# Patient Record
Sex: FEMALE | Race: WHITE | NOT HISPANIC OR LATINO | Employment: PART TIME | URBAN - METROPOLITAN AREA
[De-identification: names, ages, dates, MRNs, and addresses within clinical notes are randomized per-mention and may not be internally consistent; named-entity substitution may affect disease eponyms.]

---

## 2017-01-19 ENCOUNTER — OFFICE VISIT (OUTPATIENT)
Dept: MEDICAL GROUP | Facility: MEDICAL CENTER | Age: 47
End: 2017-01-19
Attending: FAMILY MEDICINE
Payer: MEDICAID

## 2017-01-19 VITALS
TEMPERATURE: 97.4 F | HEART RATE: 78 BPM | DIASTOLIC BLOOD PRESSURE: 80 MMHG | SYSTOLIC BLOOD PRESSURE: 120 MMHG | HEIGHT: 62 IN | BODY MASS INDEX: 34.96 KG/M2 | WEIGHT: 190 LBS | OXYGEN SATURATION: 96 %

## 2017-01-19 DIAGNOSIS — K58.2 IRRITABLE BOWEL SYNDROME WITH BOTH CONSTIPATION AND DIARRHEA: ICD-10-CM

## 2017-01-19 DIAGNOSIS — R30.0 DYSURIA: ICD-10-CM

## 2017-01-19 LAB
APPEARANCE UR: CLEAR
BILIRUB UR STRIP-MCNC: NEGATIVE MG/DL
COLOR UR AUTO: NORMAL
GLUCOSE UR STRIP.AUTO-MCNC: NEGATIVE MG/DL
KETONES UR STRIP.AUTO-MCNC: NEGATIVE MG/DL
LEUKOCYTE ESTERASE UR QL STRIP.AUTO: NEGATIVE
NITRITE UR QL STRIP.AUTO: NEGATIVE
PH UR STRIP.AUTO: 5 [PH] (ref 5–8)
PROT UR QL STRIP: NEGATIVE MG/DL
RBC UR QL AUTO: NEGATIVE
SP GR UR STRIP.AUTO: 1.01
UROBILINOGEN UR STRIP-MCNC: NEGATIVE MG/DL

## 2017-01-19 PROCEDURE — 99213 OFFICE O/P EST LOW 20 MIN: CPT | Performed by: FAMILY MEDICINE

## 2017-01-19 PROCEDURE — 99214 OFFICE O/P EST MOD 30 MIN: CPT | Performed by: FAMILY MEDICINE

## 2017-01-19 RX ORDER — CIPROFLOXACIN 500 MG/1
500 TABLET, FILM COATED ORAL 2 TIMES DAILY
Qty: 10 TAB | Refills: 1 | Status: SHIPPED | OUTPATIENT
Start: 2017-01-19 | End: 2017-03-21

## 2017-01-19 ASSESSMENT — ENCOUNTER SYMPTOMS
FLANK PAIN: 0
TINGLING: 1
CONSTIPATION: 1
SHORTNESS OF BREATH: 0
NECK PAIN: 1
SWEATS: 0
BACK PAIN: 1
COUGH: 0
VOMITING: 0
DIARRHEA: 1
PALPITATIONS: 0
ABDOMINAL PAIN: 0
HEADACHES: 0
NAUSEA: 0
FEVER: 0
CHILLS: 0

## 2017-01-19 NOTE — PROGRESS NOTES
Subjective:      Ambreen Higuera is a 46 y.o. female who presents with Dysuria and Constipation            HPI Comments: Patient here for a recent onset of worsened dysuria, recurrent constipation and diarrhea symptoms. She recently noticed that she has been having more urgency, frequency and discomfort with urination, but no fever or flank pain. She does have a history of interstitial cystitis but states it feels a little different from her normal symptoms. Will send her urine off for culture as well as Avodart and antibiotic if her symptoms should suddenly worsen. We'll continue to follow.    She also has a history of irritable bowel syndrome, with alternating bouts of constipation and diarrhea. She has tried fiber supplements exercise as well as changes in her diet but are still having worsened symptoms. Will refer to GI for a possible colonoscopy as well as a further evaluation and possible management of her symptoms. We'll continue to follow.     Current medications, allergies, and problem list reviewed with patient and updated in EPIC.      Dysuria   This is a recurrent problem. The current episode started more than 1 month ago. The problem occurs intermittently. The problem has been unchanged. The pain is at a severity of 5/10. There has been no fever. There is no history of pyelonephritis. Associated symptoms include frequency, hesitancy and urgency. Pertinent negatives include no chills, discharge, flank pain, hematuria, nausea, possible pregnancy, sweats or vomiting. She has tried nothing (will order a UCx and an antibiotic if UCx +) for the symptoms.   Constipation  Associated symptoms include back pain and diarrhea. Pertinent negatives include no abdominal pain, fever, nausea or vomiting.       Review of Systems   Constitutional: Negative for fever and chills.   HENT: Negative for hearing loss.    Respiratory: Negative for cough and shortness of breath.    Cardiovascular: Negative for chest pain and  "palpitations.   Gastrointestinal: Positive for diarrhea and constipation. Negative for nausea, vomiting and abdominal pain.   Genitourinary: Positive for dysuria, hesitancy, urgency and frequency. Negative for hematuria and flank pain.   Musculoskeletal: Positive for back pain and neck pain.   Neurological: Positive for tingling. Negative for headaches.          Objective:     /80 mmHg  Pulse 78  Temp(Src) 36.3 °C (97.4 °F)  Ht 1.575 m (5' 2.01\")  Wt 86.183 kg (190 lb)  BMI 34.74 kg/m2  SpO2 96%  LMP 03/08/2001     Physical Exam   HENT:   Right Ear: External ear normal.   Left Ear: External ear normal.   Nose: Nose normal.   Mouth/Throat: Oropharynx is clear and moist.   Eyes: EOM are normal. Pupils are equal, round, and reactive to light.   Neck: Normal range of motion.   Cardiovascular: Normal rate, regular rhythm and normal heart sounds.  Exam reveals no friction rub.    No murmur heard.  Pulmonary/Chest: Breath sounds normal. No respiratory distress. She has no wheezes. She has no rales.   Abdominal: Soft. Bowel sounds are normal. She exhibits no distension. There is tenderness. There is no rebound and no guarding.   Neurological: She is alert.   Skin: Skin is warm and dry.   Psychiatric: Her behavior is normal.               Assessment/Plan:     1. Dysuria  Will have her get a UCx, if her symptoms worsen we'll have her start Cipro twice daily as directed. We'll also have her increase her fluid intake. If her symptoms continue to worsen we'll have her follow-up with her urologist for a possible cystoscope. We'll continue to follow.  - POCT Urinalysis  - URINE CULTURE(NEW); Future    2. Irritable bowel syndrome with both constipation and diarrhea  She has tried all the conservative measures were recommended but still is having worsening symptoms. So a referral to GI has been made for additional evaluation as well as assistance in management. We'll continue to follow.  - REFERRAL TO " GASTROENTEROLOGY

## 2017-01-19 NOTE — MR AVS SNAPSHOT
"Ambreen Higuera   2017 9:10 AM   Office Visit   MRN: [id number]    [facility]   Dept Phone:  [phone]    Description:  Female : 1970   Provider:  Daniel Brand M.D.           Reason for Visit     Dysuria     Constipation           Allergies as of 2017     Allergen Noted Reactions    Codeine 2009   Vomiting    Doxycycline 10/03/2014       Monodox 2009   Vomiting    Vicodin [Hydrocodone-Acetaminophen] 2009   Vomiting      You were diagnosed with     Dysuria   [788.1.ICD-9-CM]       Irritable bowel syndrome with both constipation and diarrhea   [2678563]         Vital Signs     Blood Pressure Pulse Temperature Height Weight Body Mass Index    120/80 mmHg 78 36.3 °C (97.4 °F) 1.575 m (5' 2.01\") 86.183 kg (190 lb) 34.74 kg/m2    Oxygen Saturation Last Menstrual Period Smoking Status             96% 2001 Current Every Day Smoker         Basic Information     Date Of Birth Sex Race Ethnicity Preferred Language    1970 Female White Non- English      Your appointments     2017 11:00 AM   Follow Up Med Management with Darius Sanots M.D.   [facility] [address] [place] [address])    [address] [place] [id number] [phone]              Problem List              ICD-10-CM Priority Class Noted - Resolved    Insomnia G47.00   2009 - Present    Mild intermittent asthma without complication J45.20   2009 - Present    Hyperlipidemia E78.5   2009 - Present    Depression with anxiety F41.8   2/10/2010 - Present    PTSD (post-traumatic stress disorder) F43.10   2/10/2010 - Present    Cervicalgia M54.2   10/22/2014 - Present    Obesity (BMI 30-39.9) E66.9   12/15/2016 - Present      Health Maintenance        Date Due Completion Dates    IMM DTaP/Tdap/Td Vaccine (1 - Tdap) 1988 ---    IMM PNEUMOCOCCAL 19-64 (ADULT) MEDIUM RISK SERIES (1 of 1 - PPSV23) 1988 ---    PAP SMEAR 1990 ---    IMM " INFLUENZA (1) 9/1/2016 ---    MAMMOGRAM 3/22/2017 3/22/2016, 3/15/2016, 3/31/2010, 3/31/2010, 1/30/2009, 1/30/2009            Results     POCT Urinalysis      Component Value Standard Range & Units    POC Color Light Yellow Negative    POC Appearance clear Negative    POC Leukocyte Esterase negative Negative    POC Nitrites negative Negative    POC Urobiligen negative Negative (0.2) mg/dL    POC Protein negative Negative mg/dL    POC Urine PH 5.0 5.0 - 8.0    POC Blood negative Negative    POC Specific Gravity 1.015 <1.005 - >1.030    POC Ketones negative Negative mg/dL    POC Biliruben negative Negative mg/dL    POC Glucose negative Negative mg/dL                        Current Immunizations     No immunizations on file.      Below and/or attached are the medications your provider expects you to take. Review all of your home medications and newly ordered medications with your provider and/or pharmacist. Follow medication instructions as directed by your provider and/or pharmacist. Please keep your medication list with you and share with your provider. Update the information when medications are discontinued, doses are changed, or new medications (including over-the-counter products) are added; and carry medication information at all times in the event of emergency situations     Allergies:  CODEINE - Vomiting     DOXYCYCLINE - (reactions not documented)     MONODOX - Vomiting     VICODIN - Vomiting               Medications  Valid as of: January 19, 2017 -  9:47 AM    Generic Name Brand Name Tablet Size Instructions for use    Albuterol Sulfate (Aero Soln) albuterol 108 (90 BASE) MCG/ACT Inhale 2 Puffs by mouth as needed.        Albuterol Sulfate (Aero Soln) albuterol 108 (90 BASE) MCG/ACT Inhale 2 Puffs by mouth every four hours as needed.        Albuterol Sulfate (Aero Soln) albuterol 108 (90 BASE) MCG/ACT Inhale 2 Puffs by mouth every 6 hours as needed for Shortness of Breath.        Amitriptyline HCl (Tab)  ELAVIL 10 MG Take 100 mg by mouth every evening.        Amitriptyline HCl (Tab) ELAVIL 100 MG Take 1 Tab by mouth every bedtime.        Benzonatate (Cap) TESSALON 200 MG Take 1 Cap by mouth 3 times a day as needed for Cough.        Carbamide Peroxide (Solution) DEBROX 6.5 % Place 6 Drops in ear 2 times a day. Administer drops in both ears.        Ciprofloxacin HCl (Tab) CIPRO 500 MG Take 1 Tab by mouth 2 times a day.        ClonazePAM   Take 1 mg by mouth as needed.        ClonazePAM (Tab) KLONOPIN 1 MG Take 1 Tab by mouth 1 time daily as needed.        Fluconazole (Tab) DIFLUCAN 150 MG Take 1 Tab by mouth every day.        Gabapentin (Cap) NEURONTIN 300 MG Take 300 mg by mouth 3 times a day.        Gemfibrozil (Tab) LOPID 600 MG TAKE ONE TABLET BY MOUTH TWICE DAILY        HydroCHLOROthiazide (Tab) HYDRODIURIL 12.5 MG Take 1 Tab by mouth every day.        LamoTRIgine (Tab) LAMICTAL 200 MG Take 1 Tab by mouth every day.        Metoprolol Tartrate (Tab) LOPRESSOR 25 MG Take 0.5 Tabs by mouth 2 times a day.        Mometasone Furoate (Suspension) NASONEX 50 MCG/ACT Spray 2 Sprays in nose every day.        Ondansetron HCl (Tab) ZOFRAN 4 MG Take 1 Tab by mouth every 8 hours as needed for Nausea/Vomiting.        Oxybutynin Chloride (Tab) DITROPAN 5 MG Take 5 mg by mouth every day.        PredniSONE (Tab) DELTASONE 20 MG Take 2 tabs x 3 days, take 1 tabs x 2 days.        .                 Medicines prescribed today were sent to:     [facility] [address] [place] [address] - [facility] [address] [facility] [address]    Phone: [phone] Fax: [phone]    Open 24 Hours?: No      Medication refill instructions:       If your prescription bottle indicates you have medication refills left, it is not necessary to call your provider’s office. Please contact your pharmacy and they will refill your medication.    If your prescription bottle indicates you do not have any refills left, you may request  refills at any time through one of the following ways: The online [employer] system (except Urgent Care), by calling your provider’s office, or by asking your pharmacy to contact your provider’s office with a refill request. Medication refills are processed only during regular business hours and may not be available until the next business day. Your provider may request additional information or to have a follow-up visit with you prior to refilling your medication.   *Please Note: Medication refills are assigned a new Rx number when refilled electronically. Your pharmacy may indicate that no refills were authorized even though a new prescription for the same medication is available at the pharmacy. Please request the medicine by name with the pharmacy before contacting your provider for a refill.        Your To Do List     Future Labs/Procedures Complete By Expires    URINE CULTURE(NEW)  As directed 1/19/2018      Referral     A referral request has been sent to our patient care coordination department. Please allow 3-5 business days for us to process this request and contact you either by phone or mail. If you do not hear from us by the 5th business day, please call us at [phone].           [employer] Access Code: [email or web address]  Expires: 2/18/2017  9:47 AM    [employer]  A secure, online tool to manage your health information     [employer]’s [employer]® is a secure, online tool that connects you to your personalized health information from the privacy of your home -- day or night - making it very easy for you to manage your healthcare. Once the activation process is completed, you can even access your medical information using the [employer] andrea, which is available for free in the Apple Andrea store or Google Play store.     [employer] provides the following levels of access (as shown below):   My Chart Features   [facility] Doctor [facility]  Specialists [facility]  Urgent  Care Non-[facility]  Primary Care  Doctor   Email  your healthcare team securely and privately 24/7 X X X    Manage appointments: schedule your next appointment; view details of past/upcoming appointments X      Request prescription refills. X      View recent personal medical records, including lab and immunizations X X X X   View health record, including health history, allergies, medications X X X X   Read reports about your outpatient visits, procedures, consult and ER notes X X X X   See your discharge summary, which is a recap of your hospital and/or ER visit that includes your diagnosis, lab results, and care plan. X X       How to register for [employer]:  1. Go to  [email or web address].  2. Click on the Sign Up Now box, which takes you to the New Member Sign Up page. You will need to provide the following information:  a. Enter your Banno Access Code exactly as it appears at the top of this page. (You will not need to use this code after you’ve completed the sign-up process. If you do not sign up before the expiration date, you must request a new code.)   b. Enter your date of birth.   c. Enter your home email address.   d. Click Submit, and follow the next screen’s instructions.  3. Create a Banno ID. This will be your Banno login ID and cannot be changed, so think of one that is secure and easy to remember.  4. Create a Banno password. You can change your password at any time.  5. Enter your Password Reset Question and Answer. This can be used at a later time if you forget your password.   6. Enter your e-mail address. This allows you to receive e-mail notifications when new information is available in Banno.  7. Click Sign Up. You can now view your health information.    For assistance activating your Banno account, call [phone]

## 2017-01-24 ENCOUNTER — HOSPITAL ENCOUNTER (OUTPATIENT)
Facility: MEDICAL CENTER | Age: 47
End: 2017-01-24
Attending: FAMILY MEDICINE
Payer: MEDICAID

## 2017-01-24 DIAGNOSIS — R30.0 DYSURIA: ICD-10-CM

## 2017-01-24 PROCEDURE — 87086 URINE CULTURE/COLONY COUNT: CPT

## 2017-01-25 ENCOUNTER — OFFICE VISIT (OUTPATIENT)
Dept: MEDICAL GROUP | Facility: MEDICAL CENTER | Age: 47
End: 2017-01-25
Attending: NURSE PRACTITIONER
Payer: MEDICAID

## 2017-01-25 VITALS
RESPIRATION RATE: 18 BRPM | DIASTOLIC BLOOD PRESSURE: 80 MMHG | TEMPERATURE: 97.3 F | SYSTOLIC BLOOD PRESSURE: 130 MMHG | HEIGHT: 62 IN | BODY MASS INDEX: 35.15 KG/M2 | WEIGHT: 191 LBS | HEART RATE: 92 BPM

## 2017-01-25 DIAGNOSIS — J40 BRONCHITIS: ICD-10-CM

## 2017-01-25 DIAGNOSIS — R05.9 COUGH: ICD-10-CM

## 2017-01-25 DIAGNOSIS — L98.9 SKIN LESION: ICD-10-CM

## 2017-01-25 DIAGNOSIS — B02.9 HERPES ZOSTER WITHOUT COMPLICATION: ICD-10-CM

## 2017-01-25 PROCEDURE — 99214 OFFICE O/P EST MOD 30 MIN: CPT | Performed by: NURSE PRACTITIONER

## 2017-01-25 PROCEDURE — 99212 OFFICE O/P EST SF 10 MIN: CPT | Performed by: NURSE PRACTITIONER

## 2017-01-25 RX ORDER — AZITHROMYCIN 250 MG/1
TABLET, FILM COATED ORAL
Qty: 6 TAB | Refills: 0 | Status: SHIPPED | OUTPATIENT
Start: 2017-01-25 | End: 2017-03-21

## 2017-01-25 RX ORDER — METHYLPREDNISOLONE 4 MG/1
4 TABLET ORAL DAILY
Qty: 1 KIT | Refills: 0 | Status: SHIPPED | OUTPATIENT
Start: 2017-01-25 | End: 2017-03-21

## 2017-01-25 RX ORDER — ACYCLOVIR 200 MG/1
200 CAPSULE ORAL
Qty: 35 CAP | Refills: 0 | Status: SHIPPED | OUTPATIENT
Start: 2017-01-25 | End: 2017-03-21

## 2017-01-25 NOTE — ASSESSMENT & PLAN NOTE
"Skin lesion to left buttocks.  Pt worried it may be shingles.  States had shingles 2 months ago and had right buttocks lesion.   States had vesicle type rash to left buttocks and now it is \"itchy\" and slightly irritating. Reports feels similar to recent Shingles.  No change in skin lesion with Bactroban (left over) or with   Anti-fungal cream pt has.  Denies other areas of skin disruption.   "

## 2017-01-25 NOTE — PROGRESS NOTES
"    Chief Complaint: left buttocks skin lesion, itchy, cough/congestion    HPI:  Ambreen presents to the clinic for concern about rash on buttocks.  Her PCP, Dr Brand is not available today to see her.    Her PMH includes:    Anxiety, Depression, PTSD, Insomnia  Interstitial Cystitis  Constipation/diarrhea  Asthma, Mild  Tobacco Use-Smoker  Obesity  Vaginal Candida    Review of Records show  1/19/17 Clinic Visit w Dr Brand for concerns r/t constipation,diarrhea, dysuria  UA was negative. Pt was placed on Cipro and referred to GI MD. Egan  Report shows  12/31/16 Clonazepam 1 mg # 30 by Darius Santos  11/15/16 by Clonazepam 1 mg by LUZMARIA Santos  More similar entries for Ambien and Clonazepam by DR Santos in report  --------------------------------------------------------------------------------------------------      Skin lesion  Skin lesion to left buttocks.  Pt worried it may be shingles.  States had shingles 2 months ago and had right buttocks lesion.   States had vesicle type rash to left buttocks and now it is \"itchy\" and slightly irritating. Reports feels similar to recent Shingles.  No change in skin lesion with Bactroban (left over) or with   Anti-fungal cream pt has.  Denies other areas of skin disruption.     Cough  Pt reports cough and some lung congestion x 4-5 days.  Mucus in yellow. Phlegm is yellow. Believes she has \"bad cold\".   Felt fever last night. Today in Clinic non febrile.  Denies chills. Denies sore throat except when coughs.      Patient Active Problem List    Diagnosis Date Noted   • Skin lesion 01/25/2017   • Cough 01/25/2017   • Obesity (BMI 30-39.9) 12/15/2016   • Cervicalgia 10/22/2014   • Depression with anxiety 02/10/2010   • PTSD (post-traumatic stress disorder) 02/10/2010   • Insomnia 08/28/2009   • Mild intermittent asthma without complication 08/28/2009   • Hyperlipidemia 08/28/2009       Allergies:Codeine; Doxycycline; Monodox; and Vicodin    Current Outpatient " Prescriptions   Medication Sig Dispense Refill   • acyclovir (ZOVIRAX) 200 MG Cap Take 1 Cap by mouth 5 Times a Day. 35 Cap 0   • MethylPREDNISolone (MEDROL DOSEPAK) 4 MG Tablet Therapy Pack Take 1 Tab by mouth every day. 1 Kit 0   • azithromycin (ZITHROMAX) 250 MG Tab Z pack-Take 2 tabs day one and then 1 tab daily for a total of 5 days 6 Tab 0   • ciprofloxacin (CIPRO) 500 MG Tab Take 1 Tab by mouth 2 times a day. 10 Tab 1   • lamotrigine (LAMICTAL) 200 MG tablet Take 1 Tab by mouth every day. 30 Tab 5   • amitriptyline (ELAVIL) 100 MG Tab Take 1 Tab by mouth every bedtime. 30 Tab 5   • clonazepam (KLONOPIN) 1 MG Tab Take 1 Tab by mouth 1 time daily as needed. 30 Tab 2   • gemfibrozil (LOPID) 600 MG Tab TAKE ONE TABLET BY MOUTH TWICE DAILY 60 Tab 3   • mometasone (NASONEX) 50 MCG/ACT nasal spray Spray 2 Sprays in nose every day. 1 Inhaler 11   • fluconazole (DIFLUCAN) 150 MG tablet Take 1 Tab by mouth every day. 1 Tab 1   • albuterol 108 (90 BASE) MCG/ACT Aero Soln inhalation aerosol Inhale 2 Puffs by mouth every 6 hours as needed for Shortness of Breath. 8.5 g 3   • metoprolol (LOPRESSOR) 25 MG Tab Take 0.5 Tabs by mouth 2 times a day. 30 Tab 11   • carbamide peroxide (CARBAMOXIDE EAR DROPS) 6.5 % Solution Place 6 Drops in ear 2 times a day. Administer drops in both ears. 1 Bottle 0   • predniSONE (DELTASONE) 20 MG Tab Take 2 tabs x 3 days, take 1 tabs x 2 days. 8 Tab 0   • gabapentin (NEURONTIN) 300 MG Cap Take 300 mg by mouth 3 times a day.     • hydrochlorothiazide (HYDRODIURIL) 12.5 MG tablet Take 1 Tab by mouth every day. 30 Tab 6   • amitriptyline (ELAVIL) 10 MG Tab Take 100 mg by mouth every evening.     • albuterol (VENTOLIN OR PROVENTIL) 108 (90 BASE) MCG/ACT Aero Soln inhalation aerosol Inhale 2 Puffs by mouth every four hours as needed. 1 Inhaler 0   • benzonatate (TESSALON) 200 MG capsule Take 1 Cap by mouth 3 times a day as needed for Cough. 60 Cap 0   • ondansetron (ZOFRAN) 4 MG TABS Take 1 Tab by  "mouth every 8 hours as needed for Nausea/Vomiting. 5 Tab 0   • albuterol (VENTOLIN OR PROVENTIL) 108 (90 BASE) MCG/ACT AERS inhalation aerosol Inhale 2 Puffs by mouth as needed.     • CLONAZEPAM PO Take 1 mg by mouth as needed.     • oxybutynin (DITROPAN) 5 MG TABS Take 5 mg by mouth every day.       No current facility-administered medications for this visit.       Social History   Substance Use Topics   • Smoking status: Current Every Day Smoker     Types: Cigarettes   • Smokeless tobacco: Never Used   • Alcohol Use: No       Family History   Problem Relation Age of Onset   • Cancer Father    • Cancer Maternal Aunt        ROS:  Review of Systems   See HPI Above        Exam:  Blood pressure 130/80, pulse 92, temperature 36.3 °C (97.3 °F), resp. rate 18, height 1.575 m (5' 2.01\"), weight 86.637 kg (191 lb), last menstrual period 03/08/2001.  General:  Well nourished, over-weight, well developed female in mild distress.  HENT:Head is grossly normal. PERRL.  Neck: Supple. Trachea is midline.  Pulmonary: Slight Scattered Rhonchi to upper lung fields, Not wheezing or rales. Normal effort.   Cardiovascular: Regular rate and rhythm.  Abdomen-Abdomen is soft, No tenderness. Small flat vesicular linear skin lesion to left mid buttocks. No drainage currently. Non red.   Slight tenderness to touch.   Upper extremities- Strong = . Good ROM  Lower extremities- neg for edema, redness, tenderness.  Neuro- A & O x 4. Speech clear and appropriate.     Current medications, allergies, and problem list reviewed with patient and updated in  [facility] today.    Assessment/Plan:  1. Skin lesion  Keep dry and clean and keep clothes from rubbing on it by light breathable dressing.   2. Herpes zoster without complication  acyclovir (ZOVIRAX) 200 MG Cap    MethylPREDNISolone (MEDROL DOSEPAK) 4 MG Tablet Therapy Pack   3. Bronchitis  azithromycin (ZITHROMAX) 250 MG Tab  Pt counseled to consider smoking cessation, but not ready to quit.   4. " Cough  otc Robitussin. Stay well hydrated   Follow up as needed. Call or return if questions, concerns, or worsening condition.

## 2017-01-25 NOTE — ASSESSMENT & PLAN NOTE
"Pt reports cough and some lung congestion x 4-5 days.  Mucus in yellow. Phlegm is yellow. Believes she has \"bad cold\".   Felt fever last night. Today in Clinic non febrile.  Denies chills. Denies sore throat except when coughs.  "

## 2017-01-25 NOTE — MR AVS SNAPSHOT
"        Ambreen Higuera   2017 9:50 AM   Office Visit   MRN: [id number]    [facility]   Dept Phone:  [phone]    Description:  Female : 1970   Provider:  TERRI Moya           Reason for Visit     Rash           Allergies as of 2017     Allergen Noted Reactions    Codeine 2009   Vomiting    Doxycycline 10/03/2014       Monodox 2009   Vomiting    Vicodin [Hydrocodone-Acetaminophen] 2009   Vomiting      You were diagnosed with     Skin lesion   [085757]       Herpes zoster without complication   [723202]       Bronchitis   [916957]         Vital Signs     Blood Pressure Pulse Temperature Respirations Height Weight    130/80 mmHg 92 36.3 °C (97.3 °F) 18 1.575 m (5' 2.01\") 86.637 kg (191 lb)    Body Mass Index Last Menstrual Period Smoking Status             34.93 kg/m2 2001 Current Every Day Smoker         Basic Information     Date Of Birth Sex Race Ethnicity Preferred Language    1970 Female White Non- English      Your appointments     2017 11:00 AM   Follow Up Med Management with Darius Santos M.D.   [facility] [address] [place] [address])    [address] [place] [address]  [address] [place] [id number] [phone]              Problem List              ICD-10-CM Priority Class Noted - Resolved    Insomnia G47.00   2009 - Present    Mild intermittent asthma without complication J45.20   2009 - Present    Hyperlipidemia E78.5   2009 - Present    Depression with anxiety F41.8   2/10/2010 - Present    PTSD (post-traumatic stress disorder) F43.10   2/10/2010 - Present    Cervicalgia M54.2   10/22/2014 - Present    Obesity (BMI 30-39.9) E66.9   12/15/2016 - Present    Skin lesion L98.9   2017 - Present      Health Maintenance        Date Due Completion Dates    IMM DTaP/Tdap/Td Vaccine (1 - Tdap) 1988 ---    IMM PNEUMOCOCCAL 19-64 (ADULT) MEDIUM RISK SERIES (1 of 1 - PPSV23) 1988 ---    PAP SMEAR " 4/27/1990 ---    IMM INFLUENZA (1) 9/1/2016 ---    MAMMOGRAM 3/22/2017 3/22/2016, 3/15/2016, 3/31/2010, 3/31/2010, 1/30/2009, 1/30/2009            Current Immunizations     No immunizations on file.      Below and/or attached are the medications your provider expects you to take. Review all of your home medications and newly ordered medications with your provider and/or pharmacist. Follow medication instructions as directed by your provider and/or pharmacist. Please keep your medication list with you and share with your provider. Update the information when medications are discontinued, doses are changed, or new medications (including over-the-counter products) are added; and carry medication information at all times in the event of emergency situations     Allergies:  CODEINE - Vomiting     DOXYCYCLINE - (reactions not documented)     MONODOX - Vomiting     VICODIN - Vomiting               Medications  Valid as of: January 25, 2017 -  9:55 AM    Generic Name Brand Name Tablet Size Instructions for use    Acyclovir (Cap) ZOVIRAX 200 MG Take 1 Cap by mouth 5 Times a Day.        Albuterol Sulfate (Aero Soln) albuterol 108 (90 BASE) MCG/ACT Inhale 2 Puffs by mouth as needed.        Albuterol Sulfate (Aero Soln) albuterol 108 (90 BASE) MCG/ACT Inhale 2 Puffs by mouth every four hours as needed.        Albuterol Sulfate (Aero Soln) albuterol 108 (90 BASE) MCG/ACT Inhale 2 Puffs by mouth every 6 hours as needed for Shortness of Breath.        Amitriptyline HCl (Tab) ELAVIL 10 MG Take 100 mg by mouth every evening.        Amitriptyline HCl (Tab) ELAVIL 100 MG Take 1 Tab by mouth every bedtime.        Azithromycin (Tab) ZITHROMAX 250 MG Z pack-Take 2 tabs day one and then 1 tab daily for a total of 5 days        Benzonatate (Cap) TESSALON 200 MG Take 1 Cap by mouth 3 times a day as needed for Cough.        Carbamide Peroxide (Solution) DEBROX 6.5 % Place 6 Drops in ear 2 times a day. Administer drops in both ears.         Ciprofloxacin HCl (Tab) CIPRO 500 MG Take 1 Tab by mouth 2 times a day.        ClonazePAM   Take 1 mg by mouth as needed.        ClonazePAM (Tab) KLONOPIN 1 MG Take 1 Tab by mouth 1 time daily as needed.        Fluconazole (Tab) DIFLUCAN 150 MG Take 1 Tab by mouth every day.        Gabapentin (Cap) NEURONTIN 300 MG Take 300 mg by mouth 3 times a day.        Gemfibrozil (Tab) LOPID 600 MG TAKE ONE TABLET BY MOUTH TWICE DAILY        HydroCHLOROthiazide (Tab) HYDRODIURIL 12.5 MG Take 1 Tab by mouth every day.        LamoTRIgine (Tab) LAMICTAL 200 MG Take 1 Tab by mouth every day.        MethylPREDNISolone (Tablet Therapy Pack) MEDROL DOSEPAK 4 MG Take 1 Tab by mouth every day.        Metoprolol Tartrate (Tab) LOPRESSOR 25 MG Take 0.5 Tabs by mouth 2 times a day.        Mometasone Furoate (Suspension) NASONEX 50 MCG/ACT Spray 2 Sprays in nose every day.        Ondansetron HCl (Tab) ZOFRAN 4 MG Take 1 Tab by mouth every 8 hours as needed for Nausea/Vomiting.        Oxybutynin Chloride (Tab) DITROPAN 5 MG Take 5 mg by mouth every day.        PredniSONE (Tab) DELTASONE 20 MG Take 2 tabs x 3 days, take 1 tabs x 2 days.        .                 Medicines prescribed today were sent to:     [facility] [address] [place] [address]    Phone: [phone] Fax: [phone]    Open 24 Hours?: No      Medication refill instructions:       If your prescription bottle indicates you have medication refills left, it is not necessary to call your provider’s office. Please contact your pharmacy and they will refill your medication.    If your prescription bottle indicates you do not have any refills left, you may request refills at any time through one of the following ways: The online Machine Safety Manangement system (except Urgent Care), by calling your provider’s office, or by asking your pharmacy to contact your provider’s office with a refill request. Medication refills are  processed only during regular business hours and may not be available until the next business day. Your provider may request additional information or to have a follow-up visit with you prior to refilling your medication.   *Please Note: Medication refills are assigned a new Rx number when refilled electronically. Your pharmacy may indicate that no refills were authorized even though a new prescription for the same medication is available at the pharmacy. Please request the medicine by name with the pharmacy before contacting your provider for a refill.           PricePanda Access Code: [email or web address]  Expires: 2/18/2017  9:47 AM    PricePanda  A secure, online tool to manage your health information     [employer]’s PricePanda® is a secure, online tool that connects you to your personalized health information from the privacy of your home -- day or night - making it very easy for you to manage your healthcare. Once the activation process is completed, you can even access your medical information using the PricePanda andrea, which is available for free in the Apple Andrea store or Google Play store.     PricePanda provides the following levels of access (as shown below):   My Chart Features   [facility] Doctor [facility]  Specialists [facility]  Urgent  Care Non-[facility]  Primary Care  Doctor   Email your healthcare team securely and privately 24/7 X X X    Manage appointments: schedule your next appointment; view details of past/upcoming appointments X      Request prescription refills. X      View recent personal medical records, including lab and immunizations X X X X   View health record, including health history, allergies, medications X X X X   Read reports about your outpatient visits, procedures, consult and ER notes X X X X   See your discharge summary, which is a recap of your hospital and/or ER visit that includes your diagnosis, lab results, and care plan. X X       How to register for PricePanda:  1. Go to   [email or web address].  2. Click on the Sign Up Now box, which takes you to the New Member Sign Up page. You will need to provide the following information:  a. Enter your Suvaco Access Code exactly as it appears at the top of this page. (You will not need to use this code after you’ve completed the sign-up process. If you do not sign up before the expiration date, you must request a new code.)   b. Enter your date of birth.   c. Enter your home email address.   d. Click Submit, and follow the next screen’s instructions.  3. Create a Suvaco ID. This will be your Suvaco login ID and cannot be changed, so think of one that is secure and easy to remember.  4. Create a Innovational Fundingt password. You can change your password at any time.  5. Enter your Password Reset Question and Answer. This can be used at a later time if you forget your password.   6. Enter your e-mail address. This allows you to receive e-mail notifications when new information is available in Suvaco.  7. Click Sign Up. You can now view your health information.    For assistance activating your Suvaco account, call [phone]

## 2017-01-26 LAB
BACTERIA UR CULT: NORMAL
SIGNIFICANT IND 70042: NORMAL
SOURCE SOURCE: NORMAL

## 2017-02-09 ENCOUNTER — OFFICE VISIT (OUTPATIENT)
Dept: BEHAVIORAL HEALTH | Facility: PHYSICIAN GROUP | Age: 47
End: 2017-02-09
Payer: COMMERCIAL

## 2017-02-09 ENCOUNTER — OFFICE VISIT (OUTPATIENT)
Dept: MEDICAL GROUP | Facility: MEDICAL CENTER | Age: 47
End: 2017-02-09
Attending: FAMILY MEDICINE
Payer: MEDICAID

## 2017-02-09 VITALS
HEIGHT: 62 IN | HEART RATE: 80 BPM | SYSTOLIC BLOOD PRESSURE: 126 MMHG | WEIGHT: 191 LBS | TEMPERATURE: 97.7 F | DIASTOLIC BLOOD PRESSURE: 76 MMHG | RESPIRATION RATE: 18 BRPM | BODY MASS INDEX: 35.15 KG/M2

## 2017-02-09 VITALS
TEMPERATURE: 96.9 F | SYSTOLIC BLOOD PRESSURE: 138 MMHG | DIASTOLIC BLOOD PRESSURE: 88 MMHG | WEIGHT: 193 LBS | HEIGHT: 62 IN | HEART RATE: 88 BPM | RESPIRATION RATE: 14 BRPM | BODY MASS INDEX: 35.51 KG/M2 | OXYGEN SATURATION: 94 %

## 2017-02-09 DIAGNOSIS — B02.9 HERPES ZOSTER WITHOUT COMPLICATION: ICD-10-CM

## 2017-02-09 DIAGNOSIS — F31.81 BIPOLAR 2 DISORDER (HCC): ICD-10-CM

## 2017-02-09 DIAGNOSIS — J01.00 ACUTE MAXILLARY SINUSITIS, RECURRENCE NOT SPECIFIED: ICD-10-CM

## 2017-02-09 DIAGNOSIS — F43.10 PTSD (POST-TRAUMATIC STRESS DISORDER): ICD-10-CM

## 2017-02-09 PROCEDURE — 99214 OFFICE O/P EST MOD 30 MIN: CPT | Performed by: FAMILY MEDICINE

## 2017-02-09 PROCEDURE — 99213 OFFICE O/P EST LOW 20 MIN: CPT | Performed by: PSYCHIATRY & NEUROLOGY

## 2017-02-09 RX ORDER — AMOXICILLIN 500 MG/1
500 CAPSULE ORAL 3 TIMES DAILY
Qty: 30 CAP | Refills: 0 | Status: SHIPPED | OUTPATIENT
Start: 2017-02-09 | End: 2017-03-21

## 2017-02-09 RX ORDER — AMITRIPTYLINE HYDROCHLORIDE 50 MG/1
50 TABLET, FILM COATED ORAL
Qty: 30 TAB | Refills: 2 | Status: SHIPPED | OUTPATIENT
Start: 2017-02-09 | End: 2018-06-11

## 2017-02-09 RX ORDER — TRAZODONE HYDROCHLORIDE 100 MG/1
TABLET ORAL
Qty: 60 TAB | Refills: 2 | Status: SHIPPED | OUTPATIENT
Start: 2017-02-09 | End: 2017-06-19

## 2017-02-09 ASSESSMENT — ENCOUNTER SYMPTOMS
WHEEZING: 0
CHILLS: 0
SHORTNESS OF BREATH: 0
SORE THROAT: 1
NAUSEA: 0
HEADACHES: 0
NECK PAIN: 0
FEVER: 0
SWOLLEN GLANDS: 0
EYE PAIN: 0
SINUS PAIN: 1
DIARRHEA: 0
COUGH: 1
FATIGUE: 0
NAIL CHANGES: 0
RHINORRHEA: 0
SPUTUM PRODUCTION: 1
ANOREXIA: 0
PALPITATIONS: 0
VOMITING: 0
ABDOMINAL PAIN: 0

## 2017-02-09 NOTE — MR AVS SNAPSHOT
"        Ambreen Higuera   2017 12:50 PM   Office Visit   MRN: [id number]    Department[facility]   Dept Phone:  [phone]    Description:  Female : 1970   Provider:  Daniel Brand M.D.           Reason for Visit     Medical Advice           Allergies as of 2017     Allergen Noted Reactions    Codeine 2009   Vomiting    Doxycycline 10/03/2014       Monodox 2009   Vomiting    Vicodin [Hydrocodone-Acetaminophen] 2009   Vomiting      Vital Signs     Blood Pressure Pulse Temperature Respirations Height Weight    138/88 mmHg 88 36.1 °C (96.9 °F) 14 1.575 m (5' 2.01\") 87.544 kg (193 lb)    Body Mass Index Oxygen Saturation Last Menstrual Period Smoking Status          35.29 kg/m2 94% 2001 Current Every Day Smoker        Basic Information     Date Of Birth Sex Race Ethnicity Preferred Language    1970 Female White Non- English      Your appointments     May 09, 2017  9:00 AM   Follow Up Med Management with Darius Santos M.D.   [facility] [address] [place] [address])    [address] [place] [address]  [address] [place] [address]   [phone]              Problem List              ICD-10-CM Priority Class Noted - Resolved    Insomnia G47.00   2009 - Present    Mild intermittent asthma without complication J45.20   2009 - Present    Hyperlipidemia E78.5   2009 - Present    Depression with anxiety F41.8   2/10/2010 - Present    PTSD (post-traumatic stress disorder) F43.10   2/10/2010 - Present    Cervicalgia M54.2   10/22/2014 - Present    Obesity (BMI 30-39.9) E66.9   12/15/2016 - Present    Skin lesion L98.9   2017 - Present    Cough R05   2017 - Present      Health Maintenance        Date Due Completion Dates    IMM DTaP/Tdap/Td Vaccine (1 - Tdap) 1988 ---    IMM PNEUMOCOCCAL 19-64 (ADULT) MEDIUM RISK SERIES (1 of 1 - PPSV23) 1988 ---    PAP SMEAR 1990 ---    IMM INFLUENZA (1) 2016 ---    MAMMOGRAM 3/22/2017 3/22/2016, " 3/15/2016, 3/31/2010, 3/31/2010, 1/30/2009, 1/30/2009            Current Immunizations     No immunizations on file.      Below and/or attached are the medications your provider expects you to take. Review all of your home medications and newly ordered medications with your provider and/or pharmacist. Follow medication instructions as directed by your provider and/or pharmacist. Please keep your medication list with you and share with your provider. Update the information when medications are discontinued, doses are changed, or new medications (including over-the-counter products) are added; and carry medication information at all times in the event of emergency situations     Allergies:  CODEINE - Vomiting     DOXYCYCLINE - (reactions not documented)     MONODOX - Vomiting     VICODIN - Vomiting               Medications  Valid as of: February 09, 2017 -  1:09 PM    Generic Name Brand Name Tablet Size Instructions for use    Acyclovir (Cap) ZOVIRAX 200 MG Take 1 Cap by mouth 5 Times a Day.        Albuterol Sulfate (Aero Soln) albuterol 108 (90 BASE) MCG/ACT Inhale 2 Puffs by mouth as needed.        Albuterol Sulfate (Aero Soln) albuterol 108 (90 BASE) MCG/ACT Inhale 2 Puffs by mouth every four hours as needed.        Albuterol Sulfate (Aero Soln) albuterol 108 (90 BASE) MCG/ACT Inhale 2 Puffs by mouth every 6 hours as needed for Shortness of Breath.        Amitriptyline HCl (Tab) ELAVIL 10 MG Take 100 mg by mouth every evening.        Amitriptyline HCl (Tab) ELAVIL 100 MG Take 1 Tab by mouth every bedtime.        Amitriptyline HCl (Tab) ELAVIL 50 MG Take 1 Tab by mouth every bedtime.        Azithromycin (Tab) ZITHROMAX 250 MG Z pack-Take 2 tabs day one and then 1 tab daily for a total of 5 days        Benzonatate (Cap) TESSALON 200 MG Take 1 Cap by mouth 3 times a day as needed for Cough.        Carbamide Peroxide (Solution) DEBROX 6.5 % Place 6 Drops in ear 2 times a day. Administer drops in both ears.         Ciprofloxacin HCl (Tab) CIPRO 500 MG Take 1 Tab by mouth 2 times a day.        ClonazePAM   Take 1 mg by mouth as needed.        ClonazePAM (Tab) KLONOPIN 1 MG Take 1 Tab by mouth 1 time daily as needed.        Fluconazole (Tab) DIFLUCAN 150 MG Take 1 Tab by mouth every day.        Gabapentin (Cap) NEURONTIN 300 MG Take 300 mg by mouth 3 times a day.        Gemfibrozil (Tab) LOPID 600 MG TAKE ONE TABLET BY MOUTH TWICE DAILY        HydroCHLOROthiazide (Tab) HYDRODIURIL 12.5 MG Take 1 Tab by mouth every day.        LamoTRIgine (Tab) LAMICTAL 200 MG Take 1 Tab by mouth every day.        MethylPREDNISolone (Tablet Therapy Pack) MEDROL DOSEPAK 4 MG Take 1 Tab by mouth every day.        Metoprolol Tartrate (Tab) LOPRESSOR 25 MG Take 0.5 Tabs by mouth 2 times a day.        Mometasone Furoate (Suspension) NASONEX 50 MCG/ACT Spray 2 Sprays in nose every day.        Ondansetron HCl (Tab) ZOFRAN 4 MG Take 1 Tab by mouth every 8 hours as needed for Nausea/Vomiting.        Oxybutynin Chloride (Tab) DITROPAN 5 MG Take 5 mg by mouth every day.        PredniSONE (Tab) DELTASONE 20 MG Take 2 tabs x 3 days, take 1 tabs x 2 days.        TraZODone HCl (Tab) DESYREL 100 MG Two tabs by mouth at night        .                 Medicines prescribed today were sent to:     [facility] [address] [place] [address]    Phone: [phone] Fax: [phone]    Open 24 Hours?: No      Medication refill instructions:       If your prescription bottle indicates you have medication refills left, it is not necessary to call your provider’s office. Please contact your pharmacy and they will refill your medication.    If your prescription bottle indicates you do not have any refills left, you may request refills at any time through one of the following ways: The online Airbiquity system (except Urgent Care), by calling your provider’s office, or by asking your pharmacy to contact your  provider’s office with a refill request. Medication refills are processed only during regular business hours and may not be available until the next business day. Your provider may request additional information or to have a follow-up visit with you prior to refilling your medication.   *Please Note: Medication refills are assigned a new Rx number when refilled electronically. Your pharmacy may indicate that no refills were authorized even though a new prescription for the same medication is available at the pharmacy. Please request the medicine by name with the pharmacy before contacting your provider for a refill.           Firepro Systems Access Code: [email or web address]  Expires: 2/18/2017  9:47 AM    Firepro Systems  A secure, online tool to manage your health information     [employer]’s Firepro Systems® is a secure, online tool that connects you to your personalized health information from the privacy of your home -- day or night - making it very easy for you to manage your healthcare. Once the activation process is completed, you can even access your medical information using the Firepro Systems andrea, which is available for free in the Apple Andrea store or Google Play store.     Firepro Systems provides the following levels of access (as shown below):   My Chart Features   [facility] Doctor [facility]  Specialists [facility]  Urgent  Care Non-[facility]  Primary Care  Doctor   Email your healthcare team securely and privately 24/7 X X X    Manage appointments: schedule your next appointment; view details of past/upcoming appointments X      Request prescription refills. X      View recent personal medical records, including lab and immunizations X X X X   View health record, including health history, allergies, medications X X X X   Read reports about your outpatient visits, procedures, consult and ER notes X X X X   See your discharge summary, which is a recap of your hospital and/or ER visit that includes your diagnosis, lab results, and care plan. X  X       How to register for Railsware:  1. Go to  [email or web address].  2. Click on the Sign Up Now box, which takes you to the New Member Sign Up page. You will need to provide the following information:  a. Enter your Railsware Access Code exactly as it appears at the top of this page. (You will not need to use this code after you’ve completed the sign-up process. If you do not sign up before the expiration date, you must request a new code.)   b. Enter your date of birth.   c. Enter your home email address.   d. Click Submit, and follow the next screen’s instructions.  3. Create a Vivogigt ID. This will be your Railsware login ID and cannot be changed, so think of one that is secure and easy to remember.  4. Create a Vivogigt password. You can change your password at any time.  5. Enter your Password Reset Question and Answer. This can be used at a later time if you forget your password.   6. Enter your e-mail address. This allows you to receive e-mail notifications when new information is available in Railsware.  7. Click Sign Up. You can now view your health information.    For assistance activating your Railsware account, call [phone]

## 2017-02-09 NOTE — PROGRESS NOTES
Subjective:      Ambreen Higuera is a 46 y.o. female who presents with Medical Advice            URI   This is a new problem. The current episode started 1 to 4 weeks ago. The problem has been unchanged. Maximum temperature: feels feverish. Associated symptoms include congestion, coughing, ear pain, a rash, sinus pain and a sore throat. Pertinent negatives include no abdominal pain, chest pain, diarrhea, dysuria, headaches, joint pain, joint swelling, nausea, neck pain, plugged ear sensation, rhinorrhea, sneezing, swollen glands, vomiting or wheezing. Treatments tried: will have her continue to use her nasal steroid and nasal saline if her sxs do not improve will have her start an abx    Rash  This is a new problem. The current episode started 1 to 4 weeks ago. The problem has been gradually improving since onset. The affected locations include the left buttock. The rash is characterized by itchiness, burning and redness. Associated symptoms include congestion, coughing and a sore throat. Pertinent negatives include no anorexia, diarrhea, eye pain, facial edema, fatigue, fever, joint pain, nail changes, rhinorrhea, shortness of breath or vomiting. Treatments tried: antiviral (zovirax) Her past medical history is significant for varicella.       Review of Systems   Constitutional: Negative for fever, chills and fatigue.   HENT: Positive for congestion, ear pain and sore throat. Negative for hearing loss, rhinorrhea and sneezing.    Eyes: Negative for pain.   Respiratory: Positive for cough and sputum production. Negative for shortness of breath and wheezing.    Cardiovascular: Negative for chest pain and palpitations.   Gastrointestinal: Negative for nausea, vomiting, abdominal pain, diarrhea and anorexia.   Genitourinary: Negative for dysuria.   Musculoskeletal: Negative for joint pain and neck pain.   Skin: Positive for itching and rash. Negative for nail changes.   Neurological: Negative for headaches.  "         Objective:     /88 mmHg  Pulse 88  Temp(Src) 36.1 °C (96.9 °F)  Resp 14  Ht 1.575 m (5' 2.01\")  Wt 87.544 kg (193 lb)  BMI 35.29 kg/m2  SpO2 94%  LMP 03/08/2001     Physical Exam   HENT:   Right Ear: External ear normal.   Left Ear: External ear normal.   Congestion and pharyngeal erythema   Eyes: EOM are normal. Pupils are equal, round, and reactive to light.   Neck: Normal range of motion.   Cardiovascular: Normal rate, regular rhythm and normal heart sounds.  Exam reveals no friction rub.    No murmur heard.  Pulmonary/Chest: Breath sounds normal. No respiratory distress. She has no wheezes. She has no rales.   Abdominal: Soft. Bowel sounds are normal.   Neurological: She is alert.   Skin: Skin is warm and dry.   Psychiatric: Her behavior is normal.               Assessment/Plan:     1. Acute maxillary sinusitis, recurrence not specified  We will have her continue her nasal sprays including nasal saline and her nasal steroid as well as starting amoxicillin if her symptoms persist for longer than 10-14 days. She will also continue to stay hydrated if her symptoms worsen or she develops high fever and lethargy or other more systemic symptoms we'll have her return to clinic or go to the emergency room for further evaluation.    2. Herpes zoster without complication  We'll have her continue to use her Zovirax as directed. The rash has been healing but she is still having some itching and occasional burning over the area. If her symptoms continue after the rash has resolved we will try capsaicin cream or other medications to see if that might help lessen her symptoms. We'll continue to follow.        "

## 2017-02-09 NOTE — PROGRESS NOTES
"[facility]  PSYCHIATRIC FOLLOW-UP NOTE    Name: Ambreen Higuera  MRN: [id number]  : 1970  Age: 46 y.o.  Date of assessment: 2017  PCP: Daniel Brand M.D.  Persons in attendance: Patient    REASON FOR VISIT/CHIEF COMPLAINT (as stated by Patient):  Ambreen Higuera is a 46 y.o., White female, attending follow-up appointment for   Chief Complaint   Patient presents with   • Other     The patient is seen today for follow up on her bipolar disorder and PTSD symptoms.   .      HISTORY OF PRESENT ILLNESS:    This is a 45 yo female, single, unemployed, lives with her boyfriend, seen today for follow up on her mood disorder and PTSD. The patient has been on trazodone and wellbutrin for her insomnia and depression. The patient has been on lamotrigine 200 mg for her mood stability. The patient has been on amitriptyline 100 mg for insomnia and chronic pain. The patient is not working.     PSYCHOSOCIAL CHANGES SINCE PREVIOUS CONTACT:  The patient is anxious, nervous, and depressed.     RESPONSE TO TREATMENT:  Fair.    MEDICATION SIDE EFFECTS:  Denied.    REVIEW OF SYSTEMS:        Constitutional positive - obesity   Eyes negative   Ears/Nose/Mouth/Throat negative   Cardiovascular positive - hypertension   Respiratory negative   Gastrointestinal negative   Genitourinary negative   Muscular negative   Integumentary negative   Neurological negative   Endocrine positive - hyperlipidemia   Hematologic/Lymphatic negative       PSYCHIATRIC EXAMINATION/MENTAL STATUS  /76 mmHg  Pulse 80  Temp(Src) 36.5 °C (97.7 °F)  Resp 18  Ht 1.575 m (5' 2.01\")  Wt 86.637 kg (191 lb)  BMI 34.93 kg/m2  [facility] 2001  Participation: Active verbal participation, Attentive and Engaged  Grooming:Casual  Orientation: Alert and Fully Oriented  Eye contact: Good  Behavior:Calm   Mood: Anxious  Affect: Flexible and Full range  Thought process: Logical and Goal-directed  Thought content:  Within normal limits  Speech: " Rate within normal limits and Volume within normal limits  Perception:  Within normal limits  Memory:  No gross evidence of memory deficits  Insight: Good  Judgment: Good  Family/couple interaction observations:   Other:    Current risk:    Suicide: Low   Homicide: Low   Self-harm: Low  Relapse: Low  Other:   Crisis Safety Plan reviewed?Yes  If evidence of imminent risk is present, intervention/plan:    Medical Records/Labs/Diagnostic Tests Reviewed: yes.    Medical Records/Labs/Diagnostic Tests Ordered: none.    DIAGNOSTIC IMPRESSION(S):  1. Bipolar 2 disorder (CMS-HCC)    2. PTSD (post-traumatic stress disorder)           ASSESSMENT AND PLAN:  Bipolar 2  PTSD  Decrease Amitriptyline 50 mg at night # 30 refills two  Trazodone 100 mg two at night # 60 refills two  Lamotrigine 200 mg per day  Clonazepam 1 mg at night.  Follow up in two months.    More than 50% of face-to-face time in this 30 minute visit was spent in psychotherapy/counseling.    Topics addressed include:  The patient is unemployed and her boyfriend is helping her finacially.  The patient is unstable to work now.   Darius Santos M.D.

## 2017-03-09 ENCOUNTER — HOSPITAL ENCOUNTER (OUTPATIENT)
Dept: LAB | Facility: MEDICAL CENTER | Age: 47
End: 2017-03-09
Attending: PSYCHIATRY & NEUROLOGY
Payer: MEDICAID

## 2017-03-09 LAB
ALBUMIN SERPL BCP-MCNC: 4.4 G/DL (ref 3.2–4.9)
ALBUMIN/GLOB SERPL: 1.5 G/DL
ALP SERPL-CCNC: 66 U/L (ref 30–99)
ALT SERPL-CCNC: 26 U/L (ref 2–50)
ANION GAP SERPL CALC-SCNC: 9 MMOL/L (ref 0–11.9)
APPEARANCE UR: CLEAR
AST SERPL-CCNC: 15 U/L (ref 12–45)
BASOPHILS # BLD AUTO: 0.09 K/UL (ref 0–0.12)
BASOPHILS NFR BLD AUTO: 1.1 % (ref 0–1.8)
BILIRUB SERPL-MCNC: 0.4 MG/DL (ref 0.1–1.5)
BILIRUB UR QL STRIP.AUTO: NEGATIVE
BUN SERPL-MCNC: 13 MG/DL (ref 8–22)
CALCIUM SERPL-MCNC: 9.9 MG/DL (ref 8.5–10.5)
CHLORIDE SERPL-SCNC: 99 MMOL/L (ref 96–112)
CHOLEST SERPL-MCNC: 211 MG/DL (ref 100–199)
CO2 SERPL-SCNC: 27 MMOL/L (ref 20–33)
COLOR UR AUTO: COLORLESS
CREAT SERPL-MCNC: 0.73 MG/DL (ref 0.5–1.4)
EOSINOPHIL # BLD: 0.23 K/UL (ref 0–0.51)
EOSINOPHIL NFR BLD AUTO: 2.8 % (ref 0–6.9)
ERYTHROCYTE [DISTWIDTH] IN BLOOD BY AUTOMATED COUNT: 48.2 FL (ref 35.9–50)
EST. AVERAGE GLUCOSE BLD GHB EST-MCNC: 131 MG/DL
GLOBULIN SER CALC-MCNC: 2.9 G/DL (ref 1.9–3.5)
GLUCOSE SERPL-MCNC: 100 MG/DL (ref 65–99)
GLUCOSE UR STRIP.AUTO-MCNC: NEGATIVE MG/DL
HBA1C MFR BLD: 6.2 % (ref 0–5.6)
HCT VFR BLD AUTO: 42.4 % (ref 37–47)
HDLC SERPL-MCNC: 56 MG/DL
HGB BLD-MCNC: 14.3 G/DL (ref 12–16)
IMM GRANULOCYTES # BLD AUTO: 0.07 K/UL (ref 0–0.11)
IMM GRANULOCYTES NFR BLD AUTO: 0.8 % (ref 0–0.9)
KETONES UR STRIP.AUTO-MCNC: NEGATIVE MG/DL
LDLC SERPL CALC-MCNC: 128 MG/DL
LEUKOCYTE ESTERASE UR QL STRIP.AUTO: NEGATIVE
LYMPHOCYTES # BLD: 2.65 K/UL (ref 1–4.8)
LYMPHOCYTES NFR BLD AUTO: 31.8 % (ref 22–41)
MCH RBC QN AUTO: 31.7 PG (ref 27–33)
MCHC RBC AUTO-ENTMCNC: 33.7 G/DL (ref 33.6–35)
MCV RBC AUTO: 94 FL (ref 81.4–97.8)
MICRO URNS: NORMAL
MONOCYTES # BLD: 0.75 K/UL (ref 0–0.85)
MONOCYTES NFR BLD AUTO: 9 % (ref 0–13.4)
NEUTROPHILS # BLD: 4.55 K/UL (ref 2–7.15)
NEUTROPHILS NFR BLD AUTO: 54.5 % (ref 44–72)
NITRITE UR QL STRIP.AUTO: NEGATIVE
NRBC # BLD AUTO: 0 K/UL
NRBC BLD-RTO: 0 /100 WBC
PH UR: 6 [PH]
PLATELET # BLD AUTO: 366 K/UL (ref 164–446)
PMV BLD AUTO: 9.4 FL (ref 9–12.9)
POTASSIUM SERPL-SCNC: 4 MMOL/L (ref 3.6–5.5)
PROT SERPL-MCNC: 7.3 G/DL (ref 6–8.2)
PROT UR QL STRIP: NEGATIVE MG/DL
RBC # BLD AUTO: 4.51 M/UL (ref 4.2–5.4)
RBC UR QL AUTO: NEGATIVE
SODIUM SERPL-SCNC: 135 MMOL/L (ref 135–145)
SP GR UR STRIP.AUTO: 1
T4 FREE SERPL-MCNC: 0.71 NG/DL (ref 0.53–1.43)
TRIGL SERPL-MCNC: 136 MG/DL (ref 0–149)
TSH SERPL DL<=0.005 MIU/L-ACNC: 2.05 UIU/ML (ref 0.3–3.7)
WBC # BLD AUTO: 8.3 K/UL (ref 4.8–10.8)

## 2017-03-09 PROCEDURE — 80061 LIPID PANEL: CPT

## 2017-03-09 PROCEDURE — 83036 HEMOGLOBIN GLYCOSYLATED A1C: CPT

## 2017-03-09 PROCEDURE — 80053 COMPREHEN METABOLIC PANEL: CPT

## 2017-03-09 PROCEDURE — 84443 ASSAY THYROID STIM HORMONE: CPT

## 2017-03-09 PROCEDURE — 80307 DRUG TEST PRSMV CHEM ANLYZR: CPT

## 2017-03-09 PROCEDURE — 36415 COLL VENOUS BLD VENIPUNCTURE: CPT

## 2017-03-09 PROCEDURE — 85025 COMPLETE CBC W/AUTO DIFF WBC: CPT

## 2017-03-09 PROCEDURE — 81003 URINALYSIS AUTO W/O SCOPE: CPT

## 2017-03-09 PROCEDURE — 84439 ASSAY OF FREE THYROXINE: CPT

## 2017-03-09 RX ORDER — CLONAZEPAM 1 MG/1
TABLET ORAL
Qty: 30 TAB | Refills: 0 | Status: SHIPPED
Start: 2017-03-09 | End: 2017-06-19

## 2017-03-11 LAB
AMPHETAMINES UR QL: NEGATIVE NG/ML
BARBITURATES UR QL: NEGATIVE NG/ML
BENZODIAZ UR QL: NEGATIVE NG/ML
CANNABINOIDS UR QL SCN: POSITIVE NG/ML
COCAINE UR QL: NEGATIVE NG/ML
DRUG SCREEN COMMENT UR-IMP: NORMAL
MDMA CTO UR SCN-MCNC: POSITIVE NG/ML
METHADONE UR QL: NEGATIVE NG/ML
OPIATES UR QL: NEGATIVE NG/ML
OXYCODONE CTO UR SCN-MCNC: NEGATIVE NG/ML
PCP UR QL SCN: NEGATIVE NG/ML
PROPOXYPH UR QL: NEGATIVE NG/ML

## 2017-03-16 DIAGNOSIS — I10 ESSENTIAL HYPERTENSION: ICD-10-CM

## 2017-03-21 ENCOUNTER — OFFICE VISIT (OUTPATIENT)
Dept: MEDICAL GROUP | Facility: MEDICAL CENTER | Age: 47
End: 2017-03-21
Attending: FAMILY MEDICINE
Payer: MEDICAID

## 2017-03-21 VITALS
SYSTOLIC BLOOD PRESSURE: 128 MMHG | TEMPERATURE: 96.5 F | BODY MASS INDEX: 35.15 KG/M2 | DIASTOLIC BLOOD PRESSURE: 94 MMHG | RESPIRATION RATE: 20 BRPM | WEIGHT: 191 LBS | OXYGEN SATURATION: 96 % | HEART RATE: 60 BPM | HEIGHT: 62 IN

## 2017-03-21 DIAGNOSIS — E88.810 METABOLIC SYNDROME: ICD-10-CM

## 2017-03-21 DIAGNOSIS — E78.5 HYPERLIPIDEMIA, UNSPECIFIED HYPERLIPIDEMIA TYPE: ICD-10-CM

## 2017-03-21 DIAGNOSIS — J45.40 MODERATE PERSISTENT ASTHMA WITHOUT COMPLICATION: ICD-10-CM

## 2017-03-21 PROCEDURE — 99214 OFFICE O/P EST MOD 30 MIN: CPT | Performed by: FAMILY MEDICINE

## 2017-03-21 RX ORDER — MONTELUKAST SODIUM 10 MG/1
10 TABLET ORAL DAILY
Qty: 90 TAB | Refills: 0 | Status: SHIPPED | OUTPATIENT
Start: 2017-03-21 | End: 2017-04-19 | Stop reason: SDUPTHER

## 2017-03-21 RX ORDER — ATORVASTATIN CALCIUM 10 MG/1
10 TABLET, FILM COATED ORAL DAILY
Qty: 30 TAB | Refills: 11 | Status: SHIPPED | OUTPATIENT
Start: 2017-03-21 | End: 2018-03-29 | Stop reason: SDUPTHER

## 2017-03-21 ASSESSMENT — ENCOUNTER SYMPTOMS
ABDOMINAL PAIN: 0
FEVER: 0
HEADACHES: 0
NAUSEA: 0
VOMITING: 0
CHILLS: 0
PALPITATIONS: 0
SHORTNESS OF BREATH: 0

## 2017-03-21 NOTE — MR AVS SNAPSHOT
"        Ambreen Higuera   3/21/2017 9:30 AM   Office Visit   MRN: [id number]    [facility]   Dept Phone:  [phone]    Description:  Female : 1970   Provider:  Daniel Brand M.D.           Reason for Visit     Results           Allergies as of 3/21/2017     Allergen Noted Reactions    Codeine 2009   Vomiting    Doxycycline 10/03/2014       Monodox 2009   Vomiting    Vicodin [Hydrocodone-Acetaminophen] 2009   Vomiting      You were diagnosed with     Moderate persistent asthma without complication   [951981]       Hyperlipidemia, unspecified hyperlipidemia type   [9396757]       Impacted cerumen, bilateral   [261088]       Metabolic syndrome   [834504]         Vital Signs     Blood Pressure Pulse Temperature Respirations Height Weight    128/94 mmHg 60 35.8 °C (96.5 °F) 20 1.575 m (5' 2.01\") 86.637 kg (191 lb)    Body Mass Index Oxygen Saturation Last Menstrual Period Smoking Status          34.93 kg/m2 96% 2001 Current Every Day Smoker        Basic Information     Date Of Birth Sex Race Ethnicity Preferred Language    1970 Female White Non- English      Your appointments     May 09, 2017  9:00 AM   Follow Up Med Management with Darius Santos M.D.   [facility] [address] [place] [address])    [address] [place] [address]   [phone]              Problem List              ICD-10-CM Priority Class Noted - Resolved    Insomnia G47.00   2009 - Present    Hyperlipidemia E78.5   2009 - Present    Depression with anxiety F41.8   2/10/2010 - Present    PTSD (post-traumatic stress disorder) F43.10   2/10/2010 - Present    Cervicalgia M54.2   10/22/2014 - Present    Obesity (BMI 30-39.9) E66.9   12/15/2016 - Present    Skin lesion L98.9   2017 - Present    Cough R05   2017 - Present    Moderate persistent asthma without complication J45.40   3/21/2017 - Present      Health Maintenance        Date Due Completion Dates   " IMM DTaP/Tdap/Td Vaccine (1 - Tdap) 4/27/1988 ---    IMM PNEUMOCOCCAL 19-64 (ADULT) MEDIUM RISK SERIES (1 of 1 - PPSV23) 4/27/1988 ---    PAP SMEAR 4/27/1990 ---    IMM INFLUENZA (1) 9/1/2016 ---    MAMMOGRAM 3/22/2017 3/22/2016, 3/15/2016, 3/31/2010, 3/31/2010, 1/30/2009, 1/30/2009            Current Immunizations     No immunizations on file.      Below and/or attached are the medications your provider expects you to take. Review all of your home medications and newly ordered medications with your provider and/or pharmacist. Follow medication instructions as directed by your provider and/or pharmacist. Please keep your medication list with you and share with your provider. Update the information when medications are discontinued, doses are changed, or new medications (including over-the-counter products) are added; and carry medication information at all times in the event of emergency situations     Allergies:  CODEINE - Vomiting     DOXYCYCLINE - (reactions not documented)     MONODOX - Vomiting     VICODIN - Vomiting               Medications  Valid as of: March 21, 2017 - 10:34 AM    Generic Name Brand Name Tablet Size Instructions for use    Albuterol Sulfate (Aero Soln) albuterol 108 (90 BASE) MCG/ACT Inhale 2 Puffs by mouth every 6 hours as needed for Shortness of Breath.        Amitriptyline HCl (Tab) ELAVIL 10 MG Take 100 mg by mouth every evening.        Amitriptyline HCl (Tab) ELAVIL 50 MG Take 1 Tab by mouth every bedtime.        Atorvastatin Calcium (Tab) LIPITOR 10 MG Take 1 Tab by mouth every day.        ClonazePAM (Tab) KLONOPIN 1 MG TAKE ONE TABLET BY MOUTH ONCE DAILY AS NEEDED        Gabapentin (Cap) NEURONTIN 300 MG Take 300 mg by mouth 3 times a day.        HydroCHLOROthiazide (Tab) HYDRODIURIL 12.5 MG Take 1 Tab by mouth every day.        LamoTRIgine (Tab) LAMICTAL 200 MG Take 1 Tab by mouth every day.        MetFORMIN HCl (Tab) GLUCOPHAGE 500 MG Take 1 Tab by mouth 2 times a day, with meals.          Metoprolol Tartrate (Tab) LOPRESSOR 25 MG Take 0.5 Tabs by mouth 2 times a day.        Mometasone Furo-Formoterol Fum (Aerosol) Mometasone Furo-Formoterol Fum 200-5 MCG/ACT Inhale 1 Puff by mouth every day.        Mometasone Furoate (Suspension) NASONEX 50 MCG/ACT Spray 2 Sprays in nose every day.        Montelukast Sodium (Tab) SINGULAIR 10 MG Take 1 Tab by mouth every day.        Oxybutynin Chloride (Tab) DITROPAN 5 MG Take 5 mg by mouth every day.        TraZODone HCl (Tab) DESYREL 100 MG Two tabs by mouth at night        .                 Medicines prescribed today were sent to:     [facility] [address] [place] [address] - [facility]    [address] [place] [address]    Phone: [phone] Fax: [phone]    Open 24 Hours?: No      Medication refill instructions:       If your prescription bottle indicates you have medication refills left, it is not necessary to call your provider’s office. Please contact your pharmacy and they will refill your medication.    If your prescription bottle indicates you do not have any refills left, you may request refills at any time through one of the following ways: The online [employer] system (except Urgent Care), by calling your provider’s office, or by asking your pharmacy to contact your provider’s office with a refill request. Medication refills are processed only during regular business hours and may not be available until the next business day. Your provider may request additional information or to have a follow-up visit with you prior to refilling your medication.   *Please Note: Medication refills are assigned a new Rx number when refilled electronically. Your pharmacy may indicate that no refills were authorized even though a new prescription for the same medication is available at the pharmacy. Please request the medicine by name with the pharmacy before contacting your provider for a refill.           [employer] Access Code:  [email or web address]  Expires: 4/8/2017  7:14 AM    [employer]  A secure, online tool to manage your health information     [employer]® is a secure, online tool that connects you to your personalized health information from the privacy of your home -- day or night - making it very easy for you to manage your healthcare. Once the activation process is completed, you can even access your medical information using the [employer] andrea, which is available for free in the [employer] Andrea store or Google Play store.     [employer] provides the following levels of access (as shown below):   My Chart Features   [facility] Primary Care Doctor [facility]  Specialists [facility]  Urgent  Care Non-[facility]  Primary Care  Doctor   Email your healthcare team securely and privately 24/7 X X X    Manage appointments: schedule your next appointment; view details of past/upcoming appointments X      Request prescription refills. X      View recent personal medical records, including lab and immunizations X X X X   View health record, including health history, allergies, medications X X X X   Read reports about your outpatient visits, procedures, consult and ER notes X X X X   See your discharge summary, which is a recap of your hospital and/or ER visit that includes your diagnosis, lab results, and care plan. X X       How to register for [employer]:  1. Go to  [email or web address].  2. Click on the Sign Up Now box, which takes you to the New Member Sign Up page. You will need to provide the following information:  a. Enter your [employer] Access Code exactly as it appears at the top of this page. (You will not need to use this code after you’ve completed the sign-up process. If you do not sign up before the expiration date, you must request a new code.)   b. Enter your date of birth.   c. Enter your home email address.   d. Click Submit, and follow the next screen’s instructions.  3. Create a [employer] ID. This will be your [employer] login ID and cannot be  changed, so think of one that is secure and easy to remember.  4. Create a [employer] password. You can change your password at any time.  5. Enter your Password Reset Question and Answer. This can be used at a later time if you forget your password.   6. Enter your e-mail address. This allows you to receive e-mail notifications when new information is available in [employer].  7. Click Sign Up. You can now view your health information.    For assistance activating your [employer] account, call [phone]        Quit Tobacco Information     Do you want to quit using tobacco?    Quitting tobacco decreases risks of cancer, heart and lung disease, increases life expectancy, improves sense of taste and smell, and increases spending money, among other benefits.    If you are thinking about quitting, we can help.  • [facility] Quit Tobacco Program: 997.648.1624  o Program occurs weekly for four weeks and includes pharmacist consultation on products to support quitting smoking or chewing tobacco. A provider referral is needed for pharmacist consultation.  • Tobacco Users Help Hotline: 0-285-QUIT-NOW (288-7320) or https://[place].quitlogix.org/  o Free, confidential telephone and online coaching for [place] residents. Sessions are designed on a schedule that is convenient for you. Eligible clients receive free nicotine replacement therapy.  • Nationally: www.smokefree.gov  o Information and professional assistance to support both immediate and long-term needs as you become, and remain, a non-smoker. Smokefree.gov allows you to choose the help that best fits your needs.

## 2017-03-21 NOTE — PROGRESS NOTES
Subjective:      Ambreen Higuera is a 46 y.o. female who presents with Results            HPI Comments: Pt here for follow up of her recent blood work. She has a history of hyperlipidemia, BMI > 34, metabolic syndrome and asthma. Her recent lipid panel was as follows:    3/9/2017 06:32  Cholesterol,Tot: 211 (H)  Triglycerides: 136  HDL: 56  LDL: 128 (H)    Since her LDL level is still above 100 will have her restart her Lipitor at 10 mg daily. Also discussed diet avoiding fatty and fried foods as well as increasing the fiber in her diet and exercising as tolerated at least 20-30 minutes a day for 5 days a week. She also had an A1c level of 6.2. Although it is not at a 7 where we could possibly diagnose her as diabetic we'll have her start metformin in an attempt to control her blood sugars as well as possibly assist in weight loss. Patient understands that there are GI side effects with metformin and will use metformin with food. Her most recent creatinine levels within normal limits. We'll continue to follow.    Her recent thyroid function panel showed a normal TSH and T4 level results were as follows:     3/9/2017 06:32  TSH: 2.050  Free T-4: 0.71    We'll continue to monitor. Patient is concerned about her recent weight change but since her last visit has lost approximately 1 pound. We'll have her continue to watch her diet and exercise as tolerated. We'll continue to follow.    She is requesting a refill of her Singulair today. Since she has been having daily asthma exacerbations up to 3-4 times a day with at least one or 2 at night. Will add a steroid inhaler along with the Singulair to use with her normal rescue inhaler. Discussed checking a pulmonary function test at some point to further assess her pulmonary function. We'll continue to follow.     Current medications, allergies, and problem list reviewed with patient and updated in EPIC.        Review of Systems   Constitutional: Negative for fever and  "chills.   HENT: Negative for hearing loss.    Respiratory: Negative for shortness of breath.    Cardiovascular: Negative for chest pain and palpitations.   Gastrointestinal: Negative for nausea, vomiting and abdominal pain.   Neurological: Negative for headaches.          Objective:     /94 mmHg  Pulse 60  Temp(Src) 35.8 °C (96.5 °F)  Resp 20  Ht 1.575 m (5' 2.01\")  Wt 86.637 kg (191 lb)  BMI 34.93 kg/m2  SpO2 96%  LMP 03/08/2001     Physical Exam   HENT:   Right Ear: External ear normal.   Left Ear: External ear normal.   Nose: Nose normal.   Mouth/Throat: Oropharynx is clear and moist.   Eyes: EOM are normal. Pupils are equal, round, and reactive to light.   Neck: Normal range of motion.   Cardiovascular: Normal rate, regular rhythm and normal heart sounds.  Exam reveals no friction rub.    No murmur heard.  Pulmonary/Chest: Breath sounds normal. No respiratory distress. She has no wheezes. She has no rales.   Abdominal: Soft. Bowel sounds are normal.   Neurological: She is alert.   Skin: Skin is warm and dry.   Psychiatric: Her behavior is normal.               Assessment/Plan:     1. Moderate persistent asthma without complication  Will add Singulair 10 mg daily and dulera one Puff daily. Discussed getting a pulmonary function test to further assess her asthma symptoms. We'll continue to follow. ER precautions have also been given to patient.  - montelukast (SINGULAIR) 10 MG Tab; Take 1 Tab by mouth every day.  Dispense: 90 Tab; Refill: 0  - Mometasone Furo-Formoterol Fum (DULERA) 200-5 MCG/ACT Aerosol; Inhale 1 Puff by mouth every day.  Dispense: 1 Inhaler; Refill: 6    2. Hyperlipidemia, unspecified hyperlipidemia type  We'll have her restart Lipitor 10 mg daily. She has been advised to increase the fibers in his diet, avoid fatty/fried foods and try fish oil supplements if not allergic to seafood. Also advised to exercise as tolerated.     - atorvastatin (LIPITOR) 10 MG Tab; Take 1 Tab by mouth " every day.  Dispense: 30 Tab; Refill: 11      3. Metabolic syndrome  Will have her start metformin 500 mg bid, she will also watch her diet and exercise as tolerated. Will continue to follow.     -  metformin (GLUCOPHAGE) 500 MG Tab; Take 1 Tab by mouth 2 times a day, with meals.  Dispense: 60 Tab; Refill: 3

## 2017-04-20 RX ORDER — MONTELUKAST SODIUM 10 MG/1
TABLET ORAL
Qty: 90 TAB | Refills: 0 | Status: SHIPPED | OUTPATIENT
Start: 2017-04-20 | End: 2017-09-25 | Stop reason: SDUPTHER

## 2017-04-27 ENCOUNTER — OFFICE VISIT (OUTPATIENT)
Dept: MEDICAL GROUP | Facility: MEDICAL CENTER | Age: 47
End: 2017-04-27
Attending: FAMILY MEDICINE
Payer: MEDICAID

## 2017-04-27 VITALS
SYSTOLIC BLOOD PRESSURE: 128 MMHG | RESPIRATION RATE: 20 BRPM | WEIGHT: 191 LBS | HEART RATE: 60 BPM | TEMPERATURE: 98.1 F | BODY MASS INDEX: 35.15 KG/M2 | HEIGHT: 62 IN | OXYGEN SATURATION: 95 % | DIASTOLIC BLOOD PRESSURE: 82 MMHG

## 2017-04-27 DIAGNOSIS — L03.314 CELLULITIS OF GROIN: ICD-10-CM

## 2017-04-27 PROCEDURE — 99214 OFFICE O/P EST MOD 30 MIN: CPT | Performed by: FAMILY MEDICINE

## 2017-04-27 RX ORDER — CEPHALEXIN 500 MG/1
500 CAPSULE ORAL 4 TIMES DAILY
Qty: 28 CAP | Refills: 0 | Status: SHIPPED | OUTPATIENT
Start: 2017-04-27 | End: 2017-06-19

## 2017-04-27 ASSESSMENT — ENCOUNTER SYMPTOMS
CHILLS: 0
NAUSEA: 0
SHORTNESS OF BREATH: 0
PALPITATIONS: 0
VOMITING: 0
ABDOMINAL PAIN: 0
HEADACHES: 0
FEVER: 0

## 2017-04-27 ASSESSMENT — PATIENT HEALTH QUESTIONNAIRE - PHQ9: CLINICAL INTERPRETATION OF PHQ2 SCORE: 0

## 2017-04-27 NOTE — PROGRESS NOTES
"Subjective:      Ambreen Higuera is a 47 y.o. female who presents with No chief complaint on file.            HPI Comments: Patient here for complaints of a possible ingrown hair or infection in her vaginal area. The redness is adjacent to the right side of the labia minora. She states that the redness grows in size insurance in size. She has not noticed any drainage has not had any fevers or other systemic symptoms. She states that she has had the problem for several weeks now but it has come and gone. She recently had been treated for a yeast infection and also for a vaginosis. She is currently not on any antimicrobial treatments. She has been trying to keep the area clean and dry but it is starting to get irritated. There is a slight amount of redness present today so we will try Keflex 3 times daily and have patient continue to keep area clean and dry and will reassess in approximately a week.      Review of Systems   Constitutional: Negative for fever and chills.   HENT: Negative for hearing loss.    Respiratory: Negative for shortness of breath.    Cardiovascular: Negative for chest pain and palpitations.   Gastrointestinal: Negative for nausea, vomiting and abdominal pain.   Neurological: Negative for headaches.          Objective:     LMP 03/08/2001   Filed Vitals:    04/27/17 0738   BP: 128/82   Pulse: 60   Temp: 36.7 °C (98.1 °F)   Resp: 20   Height: 1.575 m (5' 2.01\")   Weight: 86.637 kg (191 lb)   SpO2: 95%         Physical Exam   HENT:   Right Ear: External ear normal.   Left Ear: External ear normal.   Nose: Nose normal.   Mouth/Throat: Oropharynx is clear and moist.   Eyes: EOM are normal. Pupils are equal, round, and reactive to light.   Neck: Normal range of motion.   Cardiovascular: Normal rate, regular rhythm and normal heart sounds.  Exam reveals no friction rub.    No murmur heard.  Pulmonary/Chest: Breath sounds normal. No respiratory distress. She has no wheezes. She has no rales. "   Abdominal: Soft. Bowel sounds are normal.   Neurological: She is alert.   Skin: Skin is warm and dry. No rash noted. There is erythema.   Redness adjacent to R labia minora, no drainage or fluctuance   Psychiatric: Her behavior is normal.               Assessment/Plan:     1. Cellulitis of groin  We'll have her continue to keep area clean and try and avoid any excessive contact to the affected area. Patient states that she occasionally waxes the area so we'll have her avoid that for the time she is treating this irritation or infection. We'll also have her hold onto a prescription of Keflex in case the area does increase in redness. We'll have her return in a week for a recheck. ER precautions have also given to patient.  - cephALEXin (KEFLEX) 500 MG Cap; Take 1 Cap by mouth 4 times a day.  Dispense: 28 Cap; Refill: 0

## 2017-04-27 NOTE — MR AVS SNAPSHOT
"        Ambreen Higuera   2017 7:30 AM   Office Visit   MRN: [id number]    [facility]   Dept Phone:  [phone]    Description:  Female : 1970   Provider:  Daniel Brand M.D.           Reason for Visit     Rash vagina area      Allergies as of 2017     Allergen Noted Reactions    Codeine 2009   Vomiting    Doxycycline 10/03/2014       Monodox 2009   Vomiting    Vicodin [Hydrocodone-Acetaminophen] 2009   Vomiting      Vital Signs     Blood Pressure Pulse Temperature Respirations Height Weight    128/82 mmHg 60 36.7 °C (98.1 °F) 20 1.575 m (5' 2.01\") 86.637 kg (191 lb)    Body Mass Index Oxygen Saturation Last Menstrual Period Smoking Status          34.93 kg/m2 95% 2001 Current Every Day Smoker        Basic Information     Date Of Birth Sex Race Ethnicity Preferred Language    1970 Female White Non- English      Your appointments     May 04, 2017  7:30 AM   Established Patient with Daniel Brand M.D.   [facility] ([facility])    [address] [place] [id number] [phone]           You will be receiving a confirmation call a few days before your appointment from our automated call confirmation system.            May 09, 2017  9:00 AM   Follow Up Med Management with Darius Santos M.D.   [facility] [address] [place] [address])    [address] [place] [address]   [phone]              Problem List              ICD-10-CM Priority Class Noted - Resolved    Insomnia G47.00   2009 - Present    Hyperlipidemia E78.5   2009 - Present    Depression with anxiety F41.8   2/10/2010 - Present    PTSD (post-traumatic stress disorder) F43.10   2/10/2010 - Present    Cervicalgia M54.2   10/22/2014 - Present    Obesity (BMI 30-39.9) E66.9   12/15/2016 - Present    Skin lesion L98.9   2017 - Present    Cough R05   2017 - Present    Moderate persistent asthma without complication J45.40   3/21/2017 - " Present      Health Maintenance        Date Due Completion Dates    IMM DTaP/Tdap/Td Vaccine (1 - Tdap) 4/27/1988 ---    IMM PNEUMOCOCCAL 19-64 (ADULT) MEDIUM RISK SERIES (1 of 1 - PPSV23) 4/27/1988 ---    PAP SMEAR 4/27/1990 ---    MAMMOGRAM 3/22/2017 3/22/2016, 3/31/2010, 3/31/2010, 1/30/2009, 1/30/2009            Current Immunizations     No immunizations on file.      Below and/or attached are the medications your provider expects you to take. Review all of your home medications and newly ordered medications with your provider and/or pharmacist. Follow medication instructions as directed by your provider and/or pharmacist. Please keep your medication list with you and share with your provider. Update the information when medications are discontinued, doses are changed, or new medications (including over-the-counter products) are added; and carry medication information at all times in the event of emergency situations     Allergies:  CODEINE - Vomiting     DOXYCYCLINE - (reactions not documented)     MONODOX - Vomiting     VICODIN - Vomiting               Medications  Valid as of: April 27, 2017 -  7:51 AM    Generic Name Brand Name Tablet Size Instructions for use    Albuterol Sulfate (Aero Soln) albuterol 108 (90 BASE) MCG/ACT Inhale 2 Puffs by mouth every 6 hours as needed for Shortness of Breath.        Amitriptyline HCl (Tab) ELAVIL 10 MG Take 100 mg by mouth every evening.        Amitriptyline HCl (Tab) ELAVIL 50 MG Take 1 Tab by mouth every bedtime.        Atorvastatin Calcium (Tab) LIPITOR 10 MG Take 1 Tab by mouth every day.        ClonazePAM (Tab) KLONOPIN 1 MG TAKE ONE TABLET BY MOUTH ONCE DAILY AS NEEDED        Gabapentin (Cap) NEURONTIN 300 MG Take 300 mg by mouth 3 times a day.        HydroCHLOROthiazide (Tab) HYDRODIURIL 12.5 MG Take 1 Tab by mouth every day.        LamoTRIgine (Tab) LAMICTAL 200 MG Take 1 Tab by mouth every day.        MetFORMIN HCl (Tab) GLUCOPHAGE 500 MG Take 1 Tab by mouth 2  times a day, with meals.        Metoprolol Tartrate (Tab) LOPRESSOR 25 MG Take 0.5 Tabs by mouth 2 times a day.        Mometasone Furo-Formoterol Fum (Aerosol) Mometasone Furo-Formoterol Fum 200-5 MCG/ACT Inhale 1 Puff by mouth every day.        Mometasone Furoate (Suspension) NASONEX 50 MCG/ACT Spray 2 Sprays in nose every day.        Montelukast Sodium (Tab) SINGULAIR 10 MG TAKE ONE TABLET BY MOUTH ONCE DAILY        Oxybutynin Chloride (Tab) DITROPAN 5 MG Take 5 mg by mouth every day.        TraZODone HCl (Tab) DESYREL 100 MG Two tabs by mouth at night        .                 Medicines prescribed today were sent to:     [facility] [address] [place] [address] [facility]    [address] [place] [address]    Phone: [phone] Fax: [phone]    Open 24 Hours?: No      Medication refill instructions:       If your prescription bottle indicates you have medication refills left, it is not necessary to call your provider’s office. Please contact your pharmacy and they will refill your medication.    If your prescription bottle indicates you do not have any refills left, you may request refills at any time through one of the following ways: The online Treatsie system (except Urgent Care), by calling your provider’s office, or by asking your pharmacy to contact your provider’s office with a refill request. Medication refills are processed only during regular business hours and may not be available until the next business day. Your provider may request additional information or to have a follow-up visit with you prior to refilling your medication.   *Please Note: Medication refills are assigned a new Rx number when refilled electronically. Your pharmacy may indicate that no refills were authorized even though a new prescription for the same medication is available at the pharmacy. Please request the medicine by name with the pharmacy before contacting your provider for a refill.            [employer] Access Code: [email or web address]  Expires: 5/27/2017  7:51 AM    [employer]  A secure, online tool to manage your health information     [employer]’s [employer]® is a secure, online tool that connects you to your personalized health information from the privacy of your home -- day or night - making it very easy for you to manage your healthcare. Once the activation process is completed, you can even access your medical information using the [employer] andrea, which is available for free in the Apple Andrea store or Google Play store.     [employer] provides the following levels of access (as shown below):   My Chart Features   [facility] Primary Care Doctor [facility]  Specialists [facility]  Urgent  Care Non-[facility]  Primary Care  Doctor   Email your healthcare team securely and privately 24/7 X X X    Manage appointments: schedule your next appointment; view details of past/upcoming appointments X      Request prescription refills. X      View recent personal medical records, including lab and immunizations X X X X   View health record, including health history, allergies, medications X X X X   Read reports about your outpatient visits, procedures, consult and ER notes X X X X   See your discharge summary, which is a recap of your hospital and/or ER visit that includes your diagnosis, lab results, and care plan. X X       How to register for [employer]:  1. Go to  [email or web address].  2. Click on the Sign Up Now box, which takes you to the New Member Sign Up page. You will need to provide the following information:  a. Enter your [employer] Access Code exactly as it appears at the top of this page. (You will not need to use this code after you’ve completed the sign-up process. If you do not sign up before the expiration date, you must request a new code.)   b. Enter your date of birth.   c. Enter your home email address.   d. Click Submit, and follow the next screen’s instructions.  3. Create a [employer] ID. This will be your [employer]  login ID and cannot be changed, so think of one that is secure and easy to remember.  4. Create a [employer]ogenix password. You can change your password at any time.  5. Enter your Password Reset Question and Answer. This can be used at a later time if you forget your password.   6. Enter your e-mail address. This allows you to receive e-mail notifications when new information is available in [employer].  7. Click Sign Up. You can now view your health information.    For assistance activating your [employer] account, call [phone]        Quit Tobacco Information     Do you want to quit using tobacco?    Quitting tobacco decreases risks of cancer, heart and lung disease, increases life expectancy, improves sense of taste and smell, and increases spending money, among other benefits.    If you are thinking about quitting, we can help.  • [facility] Quit Tobacco Program: [phone]  o Program occurs weekly for four weeks and includes pharmacist consultation on products to support quitting smoking or chewing tobacco. A provider referral is needed for pharmacist consultation.  • Tobacco Users Help Hotline: 1-154-QUIT-NOW (371-0633) or https://[place].quitlogix.org/  o Free, confidential telephone and online coaching for [place] residents. Sessions are designed on a schedule that is convenient for you. Eligible clients receive free nicotine replacement therapy.  • Nationally: www.smokefree.gov  o Information and professional assistance to support both immediate and long-term needs as you become, and remain, a non-smoker. Smokefree.gov allows you to choose the help that best fits your needs.

## 2017-05-08 ENCOUNTER — OFFICE VISIT (OUTPATIENT)
Dept: MEDICAL GROUP | Facility: MEDICAL CENTER | Age: 47
End: 2017-05-08
Attending: FAMILY MEDICINE
Payer: MEDICAID

## 2017-05-08 VITALS
HEIGHT: 62 IN | BODY MASS INDEX: 35.88 KG/M2 | OXYGEN SATURATION: 95 % | WEIGHT: 195 LBS | TEMPERATURE: 97.3 F | RESPIRATION RATE: 14 BRPM | HEART RATE: 80 BPM

## 2017-05-08 DIAGNOSIS — N76.0 VAGINITIS AND VULVOVAGINITIS: ICD-10-CM

## 2017-05-08 PROCEDURE — 99214 OFFICE O/P EST MOD 30 MIN: CPT | Performed by: FAMILY MEDICINE

## 2017-05-08 RX ORDER — FLUCONAZOLE 150 MG/1
150 TABLET ORAL DAILY
Qty: 1 TAB | Refills: 0 | Status: SHIPPED | OUTPATIENT
Start: 2017-05-08 | End: 2017-05-09

## 2017-05-08 ASSESSMENT — PAIN SCALES - GENERAL: PAINLEVEL: 3=SLIGHT PAIN

## 2017-05-08 ASSESSMENT — ENCOUNTER SYMPTOMS
EYE PAIN: 0
FEVER: 0
VOMITING: 0
COUGH: 0
FATIGUE: 0
SHORTNESS OF BREATH: 0
ANOREXIA: 0
DIARRHEA: 0
SORE THROAT: 0
NAIL CHANGES: 0
RHINORRHEA: 0

## 2017-05-08 NOTE — MR AVS SNAPSHOT
"        Ambreen Higuera   2017 8:10 AM   Office Visit   MRN: [id number]    Department[facility]   Dept Phone:  [phone]    Description:  Female : 1970   Provider:  Daniel Brand M.D.           Reason for Visit     Follow-Up infection      Allergies as of 2017     Allergen Noted Reactions    Codeine 2009   Vomiting    Doxycycline 10/03/2014       Monodox 2009   Vomiting    Vicodin [Hydrocodone-Acetaminophen] 2009   Vomiting      You were diagnosed with     Vaginitis and vulvovaginitis   [[id number]]         Vital Signs     Pulse Temperature Respirations Height Weight Body Mass Index    80 36.3 °C (97.3 °F) 14 1.575 m (5' 2.01\") 88.451 kg (195 lb) 35.66 kg/m2    Oxygen Saturation Last Menstrual Period Breastfeeding? Smoking Status          95% 2001 No Current Every Day Smoker        Basic Information     Date Of Birth Sex Race Ethnicity Preferred Language    1970 Female White Non- English      Your appointments     May 09, 2017  9:00 AM   Follow Up Med Management with Darius Santos M.D.   [facility] [address] [place] [address])    [address] [place] [address]  [address] [place] [address]   [phone]              Problem List              ICD-10-CM Priority Class Noted - Resolved    Insomnia G47.00   2009 - Present    Hyperlipidemia E78.5   2009 - Present    Depression with anxiety F41.8   2/10/2010 - Present    PTSD (post-traumatic stress disorder) F43.10   2/10/2010 - Present    Cervicalgia M54.2   10/22/2014 - Present    Obesity (BMI 30-39.9) E66.9   12/15/2016 - Present    Skin lesion L98.9   2017 - Present    Cough R05   2017 - Present    Moderate persistent asthma without complication J45.40   3/21/2017 - Present      Health Maintenance        Date Due Completion Dates    IMM DTaP/Tdap/Td Vaccine (1 - Tdap) 1988 ---    IMM PNEUMOCOCCAL 19-64 (ADULT) MEDIUM RISK SERIES (1 of 1 - PPSV23) 1988 ---    PAP SMEAR 1990 ---   " MAMMOGRAM 3/22/2017 3/22/2016, 3/31/2010, 3/31/2010, 1/30/2009, 1/30/2009            Current Immunizations     No immunizations on file.      Below and/or attached are the medications your provider expects you to take. Review all of your home medications and newly ordered medications with your provider and/or pharmacist. Follow medication instructions as directed by your provider and/or pharmacist. Please keep your medication list with you and share with your provider. Update the information when medications are discontinued, doses are changed, or new medications (including over-the-counter products) are added; and carry medication information at all times in the event of emergency situations     Allergies:  CODEINE - Vomiting     DOXYCYCLINE - (reactions not documented)     MONODOX - Vomiting     VICODIN - Vomiting               Medications  Valid as of: May 08, 2017 -  8:43 AM    Generic Name Brand Name Tablet Size Instructions for use    Albuterol Sulfate (Aero Soln) albuterol 108 (90 BASE) MCG/ACT Inhale 2 Puffs by mouth every 6 hours as needed for Shortness of Breath.        Amitriptyline HCl (Tab) ELAVIL 10 MG Take 100 mg by mouth every evening.        Amitriptyline HCl (Tab) ELAVIL 50 MG Take 1 Tab by mouth every bedtime.        Atorvastatin Calcium (Tab) LIPITOR 10 MG Take 1 Tab by mouth every day.        Cephalexin (Cap) KEFLEX 500 MG Take 1 Cap by mouth 4 times a day.        ClonazePAM (Tab) KLONOPIN 1 MG TAKE ONE TABLET BY MOUTH ONCE DAILY AS NEEDED        Esomeprazole Magnesium   Take  by mouth.        Fluconazole (Tab) DIFLUCAN 150 MG Take 1 Tab by mouth every day for 1 day.        Gabapentin (Cap) NEURONTIN 300 MG Take 300 mg by mouth 3 times a day.        HydroCHLOROthiazide (Tab) HYDRODIURIL 12.5 MG Take 1 Tab by mouth every day.        LamoTRIgine (Tab) LAMICTAL 200 MG Take 1 Tab by mouth every day.        MetFORMIN HCl (Tab) GLUCOPHAGE 500 MG Take 1 Tab by mouth 2 times a day, with meals.         Metoprolol Tartrate (Tab) LOPRESSOR 25 MG Take 0.5 Tabs by mouth 2 times a day.        Mometasone Furo-Formoterol Fum (Aerosol) Mometasone Furo-Formoterol Fum 200-5 MCG/ACT Inhale 1 Puff by mouth every day.        Mometasone Furoate (Suspension) NASONEX 50 MCG/ACT Spray 2 Sprays in nose every day.        Montelukast Sodium (Tab) SINGULAIR 10 MG TAKE ONE TABLET BY MOUTH ONCE DAILY        Oxybutynin Chloride (Tab) DITROPAN 5 MG Take 5 mg by mouth every day.        TraZODone HCl (Tab) DESYREL 100 MG Two tabs by mouth at night        .                 Medicines prescribed today were sent to:     [facility] [address] [place] [address] - [facility]    [address] [place] [address]    Phone: [phone] Fax: [phone]    Open 24 Hours?: No      Medication refill instructions:       If your prescription bottle indicates you have medication refills left, it is not necessary to call your provider’s office. Please contact your pharmacy and they will refill your medication.    If your prescription bottle indicates you do not have any refills left, you may request refills at any time through one of the following ways: The online Boosted [employer]ards system (except Urgent Care), by calling your provider’s office, or by asking your pharmacy to contact your provider’s office with a refill request. Medication refills are processed only during regular business hours and may not be available until the next business day. Your provider may request additional information or to have a follow-up visit with you prior to refilling your medication.   *Please Note: Medication refills are assigned a new Rx number when refilled electronically. Your pharmacy may indicate that no refills were authorized even though a new prescription for the same medication is available at the pharmacy. Please request the medicine by name with the pharmacy before contacting your provider for a refill.        Instructions    Pt advised to go to the  er for worsened sxs.             [employer] Access Code: [email or web address]  Expires: 5/27/2017  7:51 AM    [employer]  A secure, online tool to manage your health information     [employer]’s [employer]® is a secure, online tool that connects you to your personalized health information from the privacy of your home -- day or night - making it very easy for you to manage your healthcare. Once the activation process is completed, you can even access your medical information using the [employer] andrea, which is available for free in the Apple Andrea store or Google Play store.     [employer] provides the following levels of access (as shown below):   My Chart Features   [facility] Primary Care Doctor [facility]  Specialists [facility]  Urgent  Care Non-[facility]  Primary Care  Doctor   Email your healthcare team securely and privately 24/7 X X X    Manage appointments: schedule your next appointment; view details of past/upcoming appointments X      Request prescription refills. X      View recent personal medical records, including lab and immunizations X X X X   View health record, including health history, allergies, medications X X X X   Read reports about your outpatient visits, procedures, consult and ER notes X X X X   See your discharge summary, which is a recap of your hospital and/or ER visit that includes your diagnosis, lab results, and care plan. X X       How to register for [employer]:  1. Go to  [email or web address].  2. Click on the Sign Up Now box, which takes you to the New Member Sign Up page. You will need to provide the following information:  a. Enter your [employer] Access Code exactly as it appears at the top of this page. (You will not need to use this code after you’ve completed the sign-up process. If you do not sign up before the expiration date, you must request a new code.)   b. Enter your date of birth.   c. Enter your home email address.   d. Click Submit, and follow the next screen’s instructions.  3. Create a  [employer]ban[employer]mer[employer] ID. This will be your Real [employer] login ID and cannot be changed, so think of one that is secure and easy to remember.  4. Create a [employer] password. You can change your password at any time.  5. Enter your Password Reset Question and Answer. This can be used at a later time if you forget your password.   6. Enter your e-mail address. This allows you to receive e-mail notifications when new information is available in Real [employer].  7. Click Sign Up. You can now view your health information.    For assistance activating your [employer] account, call [phone]        Quit Tobacco Information     Do you want to quit using tobacco?    Quitting tobacco decreases risks of cancer, heart and lung disease, increases life expectancy, improves sense of taste and smell, and increases spending money, among other benefits.    If you are thinking about quitting, we can help.  • [facility] Quit Tobacco Program: 163.293.5931  o Program occurs weekly for four weeks and includes pharmacist consultation on products to support quitting smoking or chewing tobacco. A provider referral is needed for pharmacist consultation.  • Tobacco Users Help Hotline: 1-853-QUIT-NOW (341-0229) or https://[place].quitlogix.org/  o Free, confidential telephone and online coaching for [place] residents. Sessions are designed on a schedule that is convenient for you. Eligible clients receive free nicotine replacement therapy.  • Nationally: www.smokefree.gov  o Information and professional assistance to support both immediate and long-term needs as you become, and remain, a non-smoker. Smokefree.gov allows you to choose the help that best fits your needs.

## 2017-05-08 NOTE — PROGRESS NOTES
"Subjective:      Ambreen Higuera is a 47 y.o. female who presents with Follow-Up            Rash  This is a new problem. The current episode started 1 to 4 weeks ago. The problem has been resolved since onset. The affected locations include the genitalia. The rash is characterized by itchiness, burning and redness. Associated with: had area waxed, since being on abx rash resolved but now has discharge. Pertinent negatives include no anorexia, congestion, cough, diarrhea, eye pain, facial edema, fatigue, fever, joint pain, nail changes, rhinorrhea, shortness of breath, sore throat or vomiting. Past treatments include antibiotics (avoid waxing and shaving, will do diflucan for discharge).       Review of Systems   Constitutional: Negative for fever and fatigue.   HENT: Negative for congestion, rhinorrhea and sore throat.    Eyes: Negative for pain.   Respiratory: Negative for cough and shortness of breath.    Gastrointestinal: Negative for vomiting, diarrhea and anorexia.   Musculoskeletal: Negative for joint pain.   Skin: Positive for rash. Negative for nail changes.          Objective:     Pulse 80  Temp(Src) 36.3 °C (97.3 °F)  Resp 14  Ht 1.575 m (5' 2.01\")  Wt 88.451 kg (195 lb)  BMI 35.66 kg/m2  SpO2 95%  LMP 03/08/2001  Breastfeeding? No     Physical Exam   HENT:   Right Ear: External ear normal.   Left Ear: External ear normal.   Nose: Nose normal.   Mouth/Throat: Oropharynx is clear and moist.   Eyes: EOM are normal. Pupils are equal, round, and reactive to light.   Neck: Normal range of motion.   Cardiovascular: Normal rate, regular rhythm and normal heart sounds.  Exam reveals no friction rub.    No murmur heard.  Pulmonary/Chest: Breath sounds normal. No respiratory distress. She has no wheezes. She has no rales.   Abdominal: Soft. Bowel sounds are normal.   Genitourinary: Guaiac negative stool. Vaginal discharge found.   Thick whitish   Neurological: She is alert.   Skin: Skin is warm and dry. "   Psychiatric: Her behavior is normal.               Assessment/Plan:     1. Vaginitis and vulvovaginitis  Will have her keep area clean and dry, avoid waxing and shaving until resolved. Will try diflucan for discharge. OTC meds not working well. Cautioned pt of side effects. Will continue to follow.

## 2017-05-09 ENCOUNTER — OFFICE VISIT (OUTPATIENT)
Dept: BEHAVIORAL HEALTH | Facility: PHYSICIAN GROUP | Age: 47
End: 2017-05-09
Payer: COMMERCIAL

## 2017-05-09 VITALS
BODY MASS INDEX: 35.88 KG/M2 | DIASTOLIC BLOOD PRESSURE: 80 MMHG | TEMPERATURE: 97.9 F | HEIGHT: 62 IN | WEIGHT: 195 LBS | HEART RATE: 76 BPM | SYSTOLIC BLOOD PRESSURE: 126 MMHG | RESPIRATION RATE: 16 BRPM

## 2017-05-09 DIAGNOSIS — F43.10 PTSD (POST-TRAUMATIC STRESS DISORDER): ICD-10-CM

## 2017-05-09 DIAGNOSIS — F31.81 BIPOLAR 2 DISORDER (HCC): ICD-10-CM

## 2017-05-09 PROCEDURE — 99213 OFFICE O/P EST LOW 20 MIN: CPT | Performed by: PSYCHIATRY & NEUROLOGY

## 2017-05-09 RX ORDER — CLONAZEPAM 1 MG/1
1 TABLET ORAL
Qty: 30 TAB | Refills: 2 | Status: SHIPPED | OUTPATIENT
Start: 2017-05-09 | End: 2017-08-18 | Stop reason: SDUPTHER

## 2017-05-09 NOTE — PROGRESS NOTES
"[facility]  PSYCHIATRIC FOLLOW-UP NOTE    Name: Ambreen Higuera  MRN: [id number]  : 1970  Age: 47 y.o.  Date of assessment: 2017  PCP: Daniel Brand M.D.  Persons in attendance: Patient    REASON FOR VISIT/CHIEF COMPLAINT (as stated by Patient):  Ambreen Higuera is a 47 y.o., White female, attending follow-up appointment for   Chief Complaint   Patient presents with   • Anxiety     The patient is seen today for follow up on her bipolar disorder and PTSD.   .      HISTORY OF PRESENT ILLNESS:    This is a 48 yo female, unemployed, lives with her boyfriend, seen today for follow up. The patient is going to [facility] and she is seeing a therapist and their psychiatrist. The patient stopped lamotrigine, amitriptyline and she takes abilify 15 mg per day with bupropion  mg per day. The patient has been using clonazepam 1 mg at night for sleep. The patient reported anxiety, agoraphobia, and severe insomnia.    PSYCHOSOCIAL CHANGES SINCE PREVIOUS CONTACT:  Panic attacks.    RESPONSE TO TREATMENT:  fair    MEDICATION SIDE EFFECTS:  Denied.    REVIEW OF SYSTEMS:        Constitutional positive - obesity   Eyes negative   Ears/Nose/Mouth/Throat negative   Cardiovascular negative   Respiratory positive - asthma   Gastrointestinal negative   Genitourinary negative   Muscular negative   Integumentary negative   Neurological negative   Endocrine positive - hyperlipidemia, Hep C   Hematologic/Lymphatic negative       PSYCHIATRIC EXAMINATION/MENTAL STATUS  /80 mmHg  Pulse 76  Temp(Src) 36.6 °C (97.9 °F)  Resp 16  Ht 1.575 m (5' 2.01\")  Wt 88.451 kg (195 lb)  BMI 35.66 kg/m2  [facility] 2001  Participation: Active verbal participation, Attentive and Engaged  Grooming:Casual  Orientation: Alert and Fully Oriented  Eye contact: Good  Behavior:Calm   Mood: Anxious  Affect: Flexible and Full range  Thought process: Logical and Goal-directed  Thought content:  Within normal " limits  Speech: Rate within normal limits and Volume within normal limits  Perception:  Within normal limits  Memory:  No gross evidence of memory deficits  Insight: Good  Judgment: Good  Family/couple interaction observations:   Other:    Current risk:    Suicide: Low   Homicide: Low   Self-harm: Low  Relapse: Low  Other:   Crisis Safety Plan reviewed?Yes  If evidence of imminent risk is present, intervention/plan:    Medical Records/Labs/Diagnostic Tests Reviewed: yes.    Medical Records/Labs/Diagnostic Tests Ordered: none.    DIAGNOSTIC IMPRESSION(S):  1. Bipolar 2 disorder (CMS-HCC)    2. PTSD (post-traumatic stress disorder)           ASSESSMENT AND PLAN:  Bipolar 2  PTSD    Continue clonazepam 1 mg at night # 30 refills two  Abilify 15 mg per day  Bupropion  mg per day  Stopped lamotrigine and amitriptyline.  Follow up in three months.    More than 50% of face-to-face time in this 30 minute visit was spent in psychotherapy/counseling.    Topics addressed include:  The patient is seeing a therapist  The patient is changing her behavior by restructuring her thoughts.  Darius Santos M.D.

## 2017-06-19 ENCOUNTER — OFFICE VISIT (OUTPATIENT)
Dept: MEDICAL GROUP | Facility: MEDICAL CENTER | Age: 47
End: 2017-06-19
Attending: FAMILY MEDICINE
Payer: MEDICAID

## 2017-06-19 VITALS
SYSTOLIC BLOOD PRESSURE: 112 MMHG | TEMPERATURE: 97.7 F | HEART RATE: 80 BPM | RESPIRATION RATE: 16 BRPM | DIASTOLIC BLOOD PRESSURE: 74 MMHG | OXYGEN SATURATION: 97 % | WEIGHT: 189 LBS | HEIGHT: 62 IN | BODY MASS INDEX: 34.78 KG/M2

## 2017-06-19 DIAGNOSIS — G47.00 FREQUENT NOCTURNAL AWAKENING: ICD-10-CM

## 2017-06-19 DIAGNOSIS — A49.02 MRSA (METHICILLIN RESISTANT STAPHYLOCOCCUS AUREUS) INFECTION: ICD-10-CM

## 2017-06-19 DIAGNOSIS — J45.40 MODERATE PERSISTENT ASTHMA WITHOUT COMPLICATION: ICD-10-CM

## 2017-06-19 DIAGNOSIS — J01.00 ACUTE MAXILLARY SINUSITIS, RECURRENCE NOT SPECIFIED: ICD-10-CM

## 2017-06-19 PROCEDURE — 99214 OFFICE O/P EST MOD 30 MIN: CPT | Performed by: FAMILY MEDICINE

## 2017-06-19 RX ORDER — ALBUTEROL SULFATE 90 UG/1
2 AEROSOL, METERED RESPIRATORY (INHALATION) EVERY 6 HOURS PRN
Qty: 1 INHALER | Refills: 3 | Status: SHIPPED | OUTPATIENT
Start: 2017-06-19 | End: 2017-06-19 | Stop reason: SDUPTHER

## 2017-06-19 RX ORDER — ALBUTEROL SULFATE 90 UG/1
2 AEROSOL, METERED RESPIRATORY (INHALATION) EVERY 6 HOURS PRN
Qty: 1 INHALER | Refills: 3 | Status: SHIPPED | OUTPATIENT
Start: 2017-06-19 | End: 2018-08-31 | Stop reason: SDUPTHER

## 2017-06-19 RX ORDER — AMOXICILLIN AND CLAVULANATE POTASSIUM 875; 125 MG/1; MG/1
1 TABLET, FILM COATED ORAL 2 TIMES DAILY
Qty: 20 TAB | Refills: 0 | Status: SHIPPED | OUTPATIENT
Start: 2017-06-19 | End: 2017-10-03

## 2017-06-19 ASSESSMENT — ENCOUNTER SYMPTOMS
ABDOMINAL PAIN: 0
PALPITATIONS: 0
FEVER: 0
CHILLS: 0
HEADACHES: 0
VOMITING: 0
SHORTNESS OF BREATH: 0
NAUSEA: 0

## 2017-06-19 NOTE — PROGRESS NOTES
"Subjective:      Ambreen Higuera is a 47 y.o. female who presents with Sinus Problem and Medication Refill            HPI Comments: Patient will also need her inhalers refilled today including her albuterol as well as dulera. She says she has been using inhalers and not having any issues with them. They do help to manage her asthma symptoms. We'll continue to follow.    She has also been having episodes of frequent nocturnal awakening as well as loud snoring reported by her partner. Will order a sleep study for further evaluation of her frequent awakening as well as snoring. We'll continue to follow.     Current medications, allergies, and problem list reviewed with patient and updated in EPIC.      Sinus Problem  This is a recurrent problem. The current episode started in the past 7 days (occuring every other month). There has been no fever. Pertinent negatives include no chills, headaches or shortness of breath. Past treatments include saline sprays (will start augmentin as directed and will have her continue to use her nasal saline as directed).       Review of Systems   Constitutional: Negative for fever and chills.   HENT: Negative for hearing loss.    Respiratory: Negative for shortness of breath.    Cardiovascular: Negative for chest pain and palpitations.   Gastrointestinal: Negative for nausea, vomiting and abdominal pain.   Neurological: Negative for headaches.          Objective:     /74 mmHg  Pulse 80  Temp(Src) 36.5 °C (97.7 °F)  Resp 16  Ht 1.575 m (5' 2.01\")  Wt 85.73 kg (189 lb)  BMI 34.56 kg/m2  SpO2 97%  LMP 03/08/2001     Physical Exam   HENT:   Right Ear: External ear normal.   Left Ear: External ear normal.   Maxillary sinus tenderness   Eyes: EOM are normal. Pupils are equal, round, and reactive to light.   Neck: Normal range of motion.   Cardiovascular: Normal rate, regular rhythm and normal heart sounds.  Exam reveals no friction rub.    No murmur heard.  Pulmonary/Chest: " Breath sounds normal. No respiratory distress. She has no wheezes. She has no rales.   Abdominal: Soft. Bowel sounds are normal. She exhibits no distension. There is no tenderness.   Neurological: She is alert.   Skin: Skin is warm and dry.   Psychiatric: Her behavior is normal.               Assessment/Plan:     1. Moderate persistent asthma without complication  We'll have her continue to use her inhalers as directed. Will continue to follow.  - Mometasone Furo-Formoterol Fum (DULERA) 200-5 MCG/ACT Aerosol; Inhale 1 Puff by mouth every day.  Dispense: 1 Inhaler; Refill: 6  - albuterol 108 (90 BASE) MCG/ACT Aero Soln inhalation aerosol; Inhale 2 Puffs by mouth every 6 hours as needed for Shortness of Breath.  Dispense: 1 Inhaler; Refill: 3    2. Acute maxillary sinusitis, recurrence not specified  We'll have her continue to use her nasal saline as directed. We'll have her also start Augmentin as directed. She has any worsening of her signs or symptoms she has been instructed to return to clinic or go to the emergency room for further management.  - amoxicillin-clavulanate (AUGMENTIN) 875-125 MG Tab; Take 1 Tab by mouth 2 times a day.  Dispense: 20 Tab; Refill: 0    3. MRSA (methicillin resistant Staphylococcus aureus) infection  She has a history of recurrent MRSA infections. We'll have her hold on to Bactroban to treat her symptoms. We'll continue to follow.  - mupirocin (BACTROBAN) 2 % Ointment; Apply 1 Application to affected area(s) 2 Times a Day. To affected area.  Dispense: 1 Tube; Refill: 0    4. Frequent nocturnal awakening  Will refer to sleep clinic for sleep studies. Will continue to follow.  - REFERRAL TO SLEEP STUDIES

## 2017-06-19 NOTE — MR AVS SNAPSHOT
"        Ambreen Higuera   2017 1:50 PM   Office Visit   MRN: [id number]    [facility]   Dept Phone:  [phone]    Description:  Female : 1970   Provider:  Daniel Brand M.D.           Reason for Visit     Sinus Problem     Medication Refill           Allergies as of 2017     Allergen Noted Reactions    Codeine 2009   Vomiting    Doxycycline 10/03/2014       Monodox 2009   Vomiting    Vicodin [Hydrocodone-Acetaminophen] 2009   Vomiting      You were diagnosed with     Moderate persistent asthma without complication   [003875]       Acute maxillary sinusitis, recurrence not specified   [6004502]       MRSA (methicillin resistant Staphylococcus aureus) infection   [512001]         Vital Signs     Blood Pressure Pulse Temperature Respirations Height Weight    112/74 mmHg 80 36.5 °C (97.7 °F) 16 1.575 m (5' 2.01\") 85.73 kg (189 lb)    Body Mass Index Oxygen Saturation Last Menstrual Period Smoking Status          34.56 kg/m2 97% 2001 Current Every Day Smoker        Basic Information     Date Of Birth Sex Race Ethnicity Preferred Language    1970 Female White Non- English      Problem List              ICD-10-CM Priority Class Noted - Resolved    Insomnia G47.00   2009 - Present    Hyperlipidemia E78.5   2009 - Present    Depression with anxiety F41.8   2/10/2010 - Present    PTSD (post-traumatic stress disorder) F43.10   2/10/2010 - Present    Cervicalgia M54.2   10/22/2014 - Present    Obesity (BMI 30-39.9) E66.9   12/15/2016 - Present    Skin lesion L98.9   2017 - Present    Cough R05   2017 - Present    Moderate persistent asthma without complication J45.40   3/21/2017 - Present      Health Maintenance        Date Due Completion Dates    IMM DTaP/Tdap/Td Vaccine (1 - Tdap) 1988 ---    IMM PNEUMOCOCCAL 19-64 (ADULT) MEDIUM RISK SERIES (1 of 1 - PPSV23) 1988 ---    PAP SMEAR 1990 ---    MAMMOGRAM 3/22/2017 " 3/22/2016, 3/31/2010, 3/31/2010, 1/30/2009, 1/30/2009    COLONOSCOPY 6/13/2027 6/13/2017            Current Immunizations     No immunizations on file.      Below and/or attached are the medications your provider expects you to take. Review all of your home medications and newly ordered medications with your provider and/or pharmacist. Follow medication instructions as directed by your provider and/or pharmacist. Please keep your medication list with you and share with your provider. Update the information when medications are discontinued, doses are changed, or new medications (including over-the-counter products) are added; and carry medication information at all times in the event of emergency situations     Allergies:  CODEINE - Vomiting     DOXYCYCLINE - (reactions not documented)     MONODOX - Vomiting     VICODIN - Vomiting               Medications  Valid as of: June 19, 2017 -  2:27 PM    Generic Name Brand Name Tablet Size Instructions for use    Albuterol Sulfate (Aero Soln) albuterol 108 (90 BASE) MCG/ACT Inhale 2 Puffs by mouth every 6 hours as needed for Shortness of Breath.        Amitriptyline HCl (Tab) ELAVIL 50 MG Take 1 Tab by mouth every bedtime.        Amoxicillin-Pot Clavulanate (Tab) AUGMENTIN 875-125 MG Take 1 Tab by mouth 2 times a day.        Atorvastatin Calcium (Tab) LIPITOR 10 MG Take 1 Tab by mouth every day.        ClonazePAM (Tab) KLONOPIN 1 MG Take 1 Tab by mouth every bedtime.        Esomeprazole Magnesium   Take  by mouth.        HydroCHLOROthiazide (Tab) HYDRODIURIL 12.5 MG Take 1 Tab by mouth every day.        LamoTRIgine (Tab) LAMICTAL 200 MG Take 1 Tab by mouth every day.        MetFORMIN HCl (Tab) GLUCOPHAGE 500 MG Take 1 Tab by mouth 2 times a day, with meals.        Metoprolol Tartrate (Tab) LOPRESSOR 25 MG Take 0.5 Tabs by mouth 2 times a day.        Mometasone Furo-Formoterol Fum (Aerosol) Mometasone Furo-Formoterol Fum 200-5 MCG/ACT Inhale 1 Puff by mouth every day.           Mometasone Furoate (Suspension) NASONEX 50 MCG/ACT Spray 2 Sprays in nose every day.        Montelukast Sodium (Tab) SINGULAIR 10 MG TAKE ONE TABLET BY MOUTH ONCE DAILY        Mupirocin (Ointment) BACTROBAN 2 % Apply 1 Application to affected area(s) 2 Times a Day. To affected area.        Oxybutynin Chloride (Tab) DITROPAN 5 MG Take 5 mg by mouth every day.        .                 Medicines prescribed today were sent to:     [address], [place] [address] [facility]    [address] [facility] [place] [address]    Phone: [phone] Fax: [phone]    Open 24 Hours?: No      Medication refill instructions:       If your prescription bottle indicates you have medication refills left, it is not necessary to call your provider’s office. Please contact your pharmacy and they will refill your medication.    If your prescription bottle indicates you do not have any refills left, you may request refills at any time through one of the following ways: The online Treehouse system (except Urgent Care), by calling your provider’s office, or by asking your pharmacy to contact your provider’s office with a refill request. Medication refills are processed only during regular business hours and may not be available until the next business day. Your provider may request additional information or to have a follow-up visit with you prior to refilling your medication.   *Please Note: Medication refills are assigned a new Rx number when refilled electronically. Your pharmacy may indicate that no refills were authorized even though a new prescription for the same medication is available at the pharmacy. Please request the medicine by name with the pharmacy before contacting your provider for a refill.           Treehouse Access Code: [email or web address]  Expires: 7/19/2017  2:27 PM    Treehouse  A secure, online tool to manage your health information     [employer]’s Treehouse® is a secure, online tool that connects  you to your personalized health information from the privacy of your home -- day or night - making it very easy for you to manage your healthcare. Once the activation process is completed, you can even access your medical information using the Framed Data andrea, which is available for free in the Apple Andrea store or Google Play store.     Framed Data provides the following levels of access (as shown below):   My Chart Features   [facility] Doctor [facility]  Specialists [facility]  Urgent  Care [facility]  Primary Care  Doctor   Email your healthcare team securely and privately 24/7 X X X    Manage appointments: schedule your next appointment; view details of past/upcoming appointments X      Request prescription refills. X      View recent personal medical records, including lab and immunizations X X X X   View health record, including health history, allergies, medications X X X X   Read reports about your outpatient visits, procedures, consult and ER notes X X X X   See your discharge summary, which is a recap of your hospital and/or ER visit that includes your diagnosis, lab results, and care plan. X X       How to register for Framed Data:  1. Go to  [email or web address].  2. Click on the Sign Up Now box, which takes you to the New Member Sign Up page. You will need to provide the following information:  a. Enter your Framed Data Access Code exactly as it appears at the top of this page. (You will not need to use this code after you’ve completed the sign-up process. If you do not sign up before the expiration date, you must request a new code.)   b. Enter your date of birth.   c. Enter your home email address.   d. Click Submit, and follow the next screen’s instructions.  3. Create a Framed Data ID. This will be your Framed Data login ID and cannot be changed, so think of one that is secure and easy to remember.  4. Create a Framed Data password. You can change your password at any time.  5. Enter your Password Reset Question and  Answer. This can be used at a later time if you forget your password.   6. Enter your e-mail address. This allows you to receive e-mail notifications when new information is available in Collactive.  7. Click Sign Up. You can now view your health information.    For assistance activating your Collactive account, call [phone]        Quit Tobacco Information     Do you want to quit using tobacco?    Quitting tobacco decreases risks of cancer, heart and lung disease, increases life expectancy, improves sense of taste and smell, and increases spending money, among other benefits.    If you are thinking about quitting, we can help.  • [facility] Quit Tobacco Program: 541.285.2263  o Program occurs weekly for four weeks and includes pharmacist consultation on products to support quitting smoking or chewing tobacco. A provider referral is needed for pharmacist consultation.  • Tobacco Users Help Hotline: 6-800-QUIT-NOW (726-7258) or https://[place].quitlogix.org/  o Free, confidential telephone and online coaching for [place] residents. Sessions are designed on a schedule that is convenient for you. Eligible clients receive free nicotine replacement therapy.  • Nationally: www.smokefree.gov  o Information and professional assistance to support both immediate and long-term needs as you become, and remain, a non-smoker. Smokefree.gov allows you to choose the help that best fits your needs.

## 2017-08-18 RX ORDER — CLONAZEPAM 1 MG/1
1 TABLET ORAL
Qty: 30 TAB | Refills: 0 | Status: SHIPPED
Start: 2017-08-18 | End: 2018-06-11

## 2017-08-21 ENCOUNTER — APPOINTMENT (OUTPATIENT)
Dept: BEHAVIORAL HEALTH | Facility: PHYSICIAN GROUP | Age: 47
End: 2017-08-21
Payer: COMMERCIAL

## 2017-09-11 DIAGNOSIS — I10 ESSENTIAL HYPERTENSION: ICD-10-CM

## 2017-09-20 ENCOUNTER — HOSPITAL ENCOUNTER (OUTPATIENT)
Facility: MEDICAL CENTER | Age: 47
End: 2017-09-20
Attending: FAMILY MEDICINE
Payer: MEDICAID

## 2017-09-20 ENCOUNTER — OFFICE VISIT (OUTPATIENT)
Dept: MEDICAL GROUP | Facility: MEDICAL CENTER | Age: 47
End: 2017-09-20
Attending: FAMILY MEDICINE
Payer: MEDICAID

## 2017-09-20 ENCOUNTER — TELEPHONE (OUTPATIENT)
Dept: MEDICAL GROUP | Facility: MEDICAL CENTER | Age: 47
End: 2017-09-20

## 2017-09-20 VITALS
BODY MASS INDEX: 33.86 KG/M2 | DIASTOLIC BLOOD PRESSURE: 78 MMHG | HEIGHT: 62 IN | RESPIRATION RATE: 20 BRPM | OXYGEN SATURATION: 96 % | TEMPERATURE: 97.4 F | WEIGHT: 184 LBS | HEART RATE: 72 BPM | SYSTOLIC BLOOD PRESSURE: 114 MMHG

## 2017-09-20 DIAGNOSIS — R30.0 DYSURIA: ICD-10-CM

## 2017-09-20 LAB
APPEARANCE UR: CLEAR
BILIRUB UR STRIP-MCNC: NORMAL MG/DL
COLOR UR AUTO: YELLOW
GLUCOSE UR STRIP.AUTO-MCNC: NORMAL MG/DL
KETONES UR STRIP.AUTO-MCNC: NORMAL MG/DL
LEUKOCYTE ESTERASE UR QL STRIP.AUTO: NORMAL
NITRITE UR QL STRIP.AUTO: NORMAL
PH UR STRIP.AUTO: 5 [PH] (ref 5–8)
PROT UR QL STRIP: NORMAL MG/DL
RBC UR QL AUTO: NORMAL
SP GR UR STRIP.AUTO: 1.01
UROBILINOGEN UR STRIP-MCNC: NORMAL MG/DL

## 2017-09-20 PROCEDURE — 81002 URINALYSIS NONAUTO W/O SCOPE: CPT | Performed by: FAMILY MEDICINE

## 2017-09-20 PROCEDURE — 99214 OFFICE O/P EST MOD 30 MIN: CPT | Performed by: FAMILY MEDICINE

## 2017-09-20 PROCEDURE — 87086 URINE CULTURE/COLONY COUNT: CPT

## 2017-09-20 PROCEDURE — 99214 OFFICE O/P EST MOD 30 MIN: CPT | Mod: 25 | Performed by: FAMILY MEDICINE

## 2017-09-20 RX ORDER — SULFAMETHOXAZOLE AND TRIMETHOPRIM 800; 160 MG/1; MG/1
1 TABLET ORAL 2 TIMES DAILY
Qty: 1 TAB | Refills: 6 | Status: SHIPPED | OUTPATIENT
Start: 2017-09-20 | End: 2017-09-21 | Stop reason: SDUPTHER

## 2017-09-20 ASSESSMENT — ENCOUNTER SYMPTOMS
NAUSEA: 0
FEVER: 0
HEADACHES: 0
PALPITATIONS: 0
COUGH: 0
SHORTNESS OF BREATH: 0
VOMITING: 0
FLANK PAIN: 0
SWEATS: 0
CHILLS: 0
ABDOMINAL PAIN: 0
SPUTUM PRODUCTION: 0

## 2017-09-20 NOTE — PROGRESS NOTES
"Subjective:      Ambreen Higuera is a 47 y.o. female who presents with Dysuria            Dysuria    This is a new problem. The current episode started in the past 7 days. The problem occurs every urination. The problem has been unchanged. The quality of the pain is described as burning. The pain is at a severity of 5/10. There has been no fever. She is sexually active. There is no history of pyelonephritis. Associated symptoms include frequency, hesitancy and urgency. Pertinent negatives include no chills, discharge, flank pain, hematuria, nausea, possible pregnancy, sweats or vomiting. She has tried nothing (will start bactrim and send for cultures) for the symptoms. Her past medical history is significant for a urological procedure. There is no history of catheterization, kidney stones, recurrent UTIs, a single kidney or urinary stasis. history of IC       Review of Systems   Constitutional: Negative for chills and fever.   HENT: Negative for hearing loss.    Respiratory: Negative for cough, sputum production and shortness of breath.    Cardiovascular: Negative for chest pain and palpitations.   Gastrointestinal: Negative for abdominal pain, nausea and vomiting.   Genitourinary: Positive for dysuria, frequency, hesitancy and urgency. Negative for flank pain and hematuria.   Neurological: Negative for headaches.          Objective:     /78   Pulse 72   Temp 36.3 °C (97.4 °F)   Resp 20   Ht 1.575 m (5' 2.01\")   Wt 83.5 kg (184 lb)   LMP 03/08/2001   SpO2 96%   BMI 33.65 kg/m²      Physical Exam   Constitutional: She is oriented to person, place, and time. She appears well-developed and well-nourished.   Cardiovascular: Normal rate, regular rhythm and normal heart sounds.  Exam reveals no friction rub.    No murmur heard.  Pulmonary/Chest: Effort normal and breath sounds normal. No respiratory distress. She has no wheezes. She has no rales.   Abdominal: Soft. Bowel sounds are normal. She exhibits " no distension. There is no tenderness.   Pelvic tenderness, no CVAT   Neurological: She is alert and oriented to person, place, and time.   Psychiatric: She has a normal mood and affect. Her behavior is normal.   Nursing note and vitals reviewed.              Assessment/Plan:     1. Dysuria  We'll have patient start Bactrim and urine will be sent out for culture. Since she has a history of interstitial cystitis patient has been advised to return to clinic for any worsening symptoms or to go to the emergency room or contact her urologist for any persistence or significant worsening of her current symptoms.

## 2017-09-21 ENCOUNTER — TELEPHONE (OUTPATIENT)
Dept: MEDICAL GROUP | Facility: MEDICAL CENTER | Age: 47
End: 2017-09-21

## 2017-09-21 RX ORDER — SULFAMETHOXAZOLE AND TRIMETHOPRIM 800; 160 MG/1; MG/1
1 TABLET ORAL 2 TIMES DAILY
Qty: 10 TAB | Refills: 0 | Status: SHIPPED | OUTPATIENT
Start: 2017-09-21 | End: 2018-03-05

## 2017-09-21 NOTE — TELEPHONE ENCOUNTER
Phone Number Called: [phone] (home)     Message: spoke with patient advised her Rx was corrected and sent to pharmacy     Left Message for patient to call back: no

## 2017-09-23 LAB
BACTERIA UR CULT: NORMAL
SIGNIFICANT IND 70042: NORMAL
SOURCE SOURCE: NORMAL

## 2017-09-25 RX ORDER — MONTELUKAST SODIUM 10 MG/1
TABLET ORAL
Qty: 90 TAB | Refills: 0 | Status: SHIPPED | OUTPATIENT
Start: 2017-09-25 | End: 2018-01-02 | Stop reason: SDUPTHER

## 2017-09-29 ENCOUNTER — OFFICE VISIT (OUTPATIENT)
Dept: BEHAVIORAL HEALTH | Facility: PHYSICIAN GROUP | Age: 47
End: 2017-09-29
Payer: COMMERCIAL

## 2017-09-29 VITALS
SYSTOLIC BLOOD PRESSURE: 116 MMHG | BODY MASS INDEX: 33.86 KG/M2 | WEIGHT: 184 LBS | RESPIRATION RATE: 18 BRPM | TEMPERATURE: 97.9 F | HEIGHT: 62 IN | DIASTOLIC BLOOD PRESSURE: 74 MMHG | HEART RATE: 78 BPM

## 2017-09-29 DIAGNOSIS — F31.81 BIPOLAR 2 DISORDER (HCC): ICD-10-CM

## 2017-09-29 DIAGNOSIS — F43.12 CHRONIC POST-TRAUMATIC STRESS DISORDER (PTSD): ICD-10-CM

## 2017-09-29 PROCEDURE — 99213 OFFICE O/P EST LOW 20 MIN: CPT | Performed by: PSYCHIATRY & NEUROLOGY

## 2017-09-29 PROCEDURE — 90833 PSYTX W PT W E/M 30 MIN: CPT | Performed by: PSYCHIATRY & NEUROLOGY

## 2017-09-29 RX ORDER — CLONAZEPAM 1 MG/1
1 TABLET ORAL
Qty: 30 TAB | Refills: 2 | Status: SHIPPED | OUTPATIENT
Start: 2017-09-29 | End: 2019-02-27 | Stop reason: SDUPTHER

## 2017-09-29 ASSESSMENT — PATIENT HEALTH QUESTIONNAIRE - PHQ9
8. MOVING OR SPEAKING SO SLOWLY THAT OTHER PEOPLE COULD HAVE NOTICED. OR THE OPPOSITE, BEING SO FIGETY OR RESTLESS THAT YOU HAVE BEEN MOVING AROUND A LOT MORE THAN USUAL: 1
6. FEELING BAD ABOUT YOURSELF - OR THAT YOU ARE A FAILURE OR HAVE LET YOURSELF OR YOUR FAMILY DOWN: 1
9. THOUGHTS THAT YOU WOULD BE BETTER OFF DEAD, OR OF HURTING YOURSELF: 0
7. TROUBLE CONCENTRATING ON THINGS, SUCH AS READING THE NEWSPAPER OR WATCHING TELEVISION: 1
1. LITTLE INTEREST OR PLEASURE IN DOING THINGS: 2
SUM OF ALL RESPONSES TO PHQ QUESTIONS 1-9: 10
2. FEELING DOWN, DEPRESSED, IRRITABLE, OR HOPELESS: 2
SUM OF ALL RESPONSES TO PHQ9 QUESTIONS 1 AND 2: 4
3. TROUBLE FALLING OR STAYING ASLEEP OR SLEEPING TOO MUCH: 1
5. POOR APPETITE OR OVEREATING: 1
4. FEELING TIRED OR HAVING LITTLE ENERGY: 1

## 2017-09-29 NOTE — PROGRESS NOTES
"[facility]  PSYCHIATRIC FOLLOW-UP NOTE    Name: Ambreen Higuera  MRN: [id number]  : 1970  Age: 47 y.o.  Date of assessment: 2017  PCP: Daniel Brand M.D.  Persons in attendance: Patient  REASON FOR VISIT/CHIEF COMPLAINT (as stated by Patient):  Ambreen Higuera is a 47 y.o., White female, attending follow-up appointment for   Chief Complaint   Patient presents with   • Medication Management     I am taking abilify 20 mg per day and bupropion  mg per day.    .      HISTORY OF PRESENT ILLNESS:    This is 46 yo female, unemployed, lives with her boyfriend, seen today for follow up. The patient is going to [facility] and seeing a therapist and a psyvhiatrist. The patient has been taking abilify 20 mg per day and bupropion  mg per day. The patient has been feeling anxious and restless. The patient has been taking temazepam for sleep and clonazepam 1 mg for her anxiety during the day.The patient is not sleeping well.     PSYCHOSOCIAL CHANGES SINCE PREVIOUS CONTACT:  Anxious and nervous all the time.    RESPONSE TO TREATMENT:  Good.    MEDICATION SIDE EFFECTS:  Denied.    REVIEW OF SYSTEMS:        Constitutional positive - obesity   Eyes negative   Ears/Nose/Mouth/Throat negative   Cardiovascular negative   Respiratory positive - asthma   Gastrointestinal negative   Genitourinary negative   Muscular negative   Integumentary negative   Neurological positive - chronic back pain   Endocrine positive - hyperlipidemia   Hematologic/Lymphatic positive - hep C       PSYCHIATRIC EXAMINATION/MENTAL STATUS  /74   Pulse 78   Temp 36.6 °C (97.9 °F)   Resp 18   Ht 1.575 m (5' 2.01\")   Wt 83.5 kg (184 lb)   LMP 2001   BMI 33.65 kg/m²   Participation: Active verbal participation and Attentive  Grooming:Casual  Orientation: Alert and Fully Oriented  Eye contact: Good  Behavior:Calm   Mood: Anxious  Affect: Anxious  Thought process: Logical and Goal-directed  Thought " content:  Within normal limits  Speech: Rate within normal limits and Volume within normal limits  Perception:  Within normal limits  Memory:  No gross evidence of memory deficits  Insight: Good  Judgment: Good  Family/couple interaction observations:   Other:    Current risk:    Suicide: Low   Homicide: Low   Self-harm: Low  Relapse: Low  Other:   Crisis Safety Plan reviewed?Yes  If evidence of imminent risk is present, intervention/plan:    Medical Records/Labs/Diagnostic Tests Reviewed: yes.    Medical Records/Labs/Diagnostic Tests Ordered: none.    DIAGNOSTIC IMPRESSION(S):  1. Bipolar 2 disorder (CMS-HCC)    2. Chronic post-traumatic stress disorder (PTSD)           ASSESSMENT AND PLAN:  1. Bipolar 2.  Abilify 20 mg per day.    2. PTSD  Bupropion  mg per day.  Clonazepam 1 mg per day # 30 refills two  NARx checked.  Patient signed controlled medication treatment agreement.    3. Follow up as needed.      16 minutes were spent in psychotherapy.  (If greater than 16 minutes, add 02504 code)   Topics addressed in psychotherapy include:  We talked about identifying stress and monitor the triggers for relapse.  Agreed to keep a daily routine and a good sleep cycle.  Cognitive restructuring and behavioral changes.  Relaxation and meditation.  Darius Santos M.D.

## 2017-10-03 ENCOUNTER — OFFICE VISIT (OUTPATIENT)
Dept: MEDICAL GROUP | Facility: MEDICAL CENTER | Age: 47
End: 2017-10-03
Attending: FAMILY MEDICINE
Payer: MEDICAID

## 2017-10-03 VITALS
BODY MASS INDEX: 34.78 KG/M2 | TEMPERATURE: 96.6 F | HEART RATE: 94 BPM | DIASTOLIC BLOOD PRESSURE: 80 MMHG | RESPIRATION RATE: 20 BRPM | WEIGHT: 189 LBS | OXYGEN SATURATION: 96 % | SYSTOLIC BLOOD PRESSURE: 124 MMHG | HEIGHT: 62 IN

## 2017-10-03 DIAGNOSIS — B37.31 YEAST VAGINITIS: ICD-10-CM

## 2017-10-03 DIAGNOSIS — N30.10 INTERSTITIAL CYSTITIS: ICD-10-CM

## 2017-10-03 DIAGNOSIS — A49.02 MRSA (METHICILLIN RESISTANT STAPHYLOCOCCUS AUREUS) INFECTION: ICD-10-CM

## 2017-10-03 PROCEDURE — 99214 OFFICE O/P EST MOD 30 MIN: CPT | Performed by: FAMILY MEDICINE

## 2017-10-03 RX ORDER — FLUCONAZOLE 150 MG/1
150 TABLET ORAL DAILY
Qty: 1 TAB | Refills: 1 | Status: SHIPPED | OUTPATIENT
Start: 2017-10-03 | End: 2018-03-05

## 2017-10-03 ASSESSMENT — ENCOUNTER SYMPTOMS
NAUSEA: 0
CHILLS: 0
SWEATS: 0
SPUTUM PRODUCTION: 0
FEVER: 0
PALPITATIONS: 0
COUGH: 0
SHORTNESS OF BREATH: 0
HEADACHES: 0
FLANK PAIN: 0
VOMITING: 0

## 2017-10-04 NOTE — PROGRESS NOTES
"Subjective:      Ambreen Higuera is a 47 y.o. female who presents with Follow-Up (uti) and Other (sore llq)            Dysuria    This is a recurrent problem. The current episode started 1 to 4 weeks ago. The problem occurs intermittently. The problem has been gradually improving. The quality of the pain is described as burning. There has been no fever. She is sexually active. There is no history of pyelonephritis. Associated symptoms include a discharge, frequency, hesitancy and urgency. Pertinent negatives include no chills, flank pain, hematuria, nausea, possible pregnancy, sweats or vomiting. Associated symptoms comments: Thick white discharge vaginally. She has tried antibiotics (will have her referred to urology for an interstistial cystitis and will have her try diflucan x1 for a possible yeast infection following abx use) for the symptoms.       Review of Systems   Constitutional: Negative for chills and fever.   HENT: Negative for hearing loss.    Respiratory: Negative for cough, sputum production and shortness of breath.    Cardiovascular: Negative for chest pain and palpitations.   Gastrointestinal: Negative for nausea and vomiting.   Genitourinary: Positive for dysuria, frequency, hesitancy and urgency. Negative for flank pain and hematuria.        Thick white vaginal discharge with itching and the perineum   Neurological: Negative for headaches.          Objective:     /80   Pulse 94   Temp 35.9 °C (96.6 °F)   Resp 20   Ht 1.575 m (5' 2.01\")   Wt 85.7 kg (189 lb)   LMP 03/08/2001   SpO2 96%   BMI 34.56 kg/m²      Physical Exam   Constitutional: She is oriented to person, place, and time.   BMI 34.56   HENT:   Head: Normocephalic and atraumatic.   Cardiovascular: Normal rate, regular rhythm and normal heart sounds.  Exam reveals no friction rub.    No murmur heard.  Pulmonary/Chest: Effort normal and breath sounds normal. No respiratory distress. She has no wheezes.   Abdominal: Soft. " Bowel sounds are normal. She exhibits no distension. There is tenderness.   Suprapubic   Neurological: She is alert and oriented to person, place, and time.   Skin: Skin is warm and dry.   Nursing note and vitals reviewed.              Assessment/Plan:     1. MRSA (methicillin resistant Staphylococcus aureus) infection   We'll have patient apply Bactroban to excoriated area on left inguinal excoriated lesion. We'll continue to follow.  - mupirocin (BACTROBAN) 2 % Ointment; Apply 1 Application to affected area(s) 2 Times a Day. To affected area.  Dispense: 1 Tube; Refill: 0    2. Interstitial cystitis  A referral has been made to urology for a further evaluation for possible interstitial cystitis. She has been diagnosed with interstitial cystitis in the past and had been seeing a specialist that has since retired so she will need to be reestablished with urology for a cystoscopy. ER precautions and also been given to patient.  - REFERRAL TO UROLOGY      3. BMI 34.0-34.9,adult  We'll have her continue to watch her diet and exercise as tolerated.  - Patient identified as having weight management issue.  Appropriate orders and counseling given.    4. Yeast vaginitis  We'll have her try Diflucan ×1. If she continues to have recurrent infections will refer to GYN for further evaluation of possible assistance in management. We'll continue to follow.

## 2017-10-09 ENCOUNTER — DOCUMENTATION (OUTPATIENT)
Dept: BEHAVIORAL HEALTH | Facility: PHYSICIAN GROUP | Age: 47
End: 2017-10-09

## 2017-12-04 ENCOUNTER — OFFICE VISIT (OUTPATIENT)
Dept: MEDICAL GROUP | Facility: MEDICAL CENTER | Age: 47
End: 2017-12-04
Attending: FAMILY MEDICINE
Payer: MEDICAID

## 2017-12-04 VITALS
WEIGHT: 189 LBS | HEART RATE: 64 BPM | TEMPERATURE: 97.4 F | OXYGEN SATURATION: 96 % | SYSTOLIC BLOOD PRESSURE: 130 MMHG | HEIGHT: 62 IN | RESPIRATION RATE: 20 BRPM | BODY MASS INDEX: 34.78 KG/M2 | DIASTOLIC BLOOD PRESSURE: 78 MMHG

## 2017-12-04 DIAGNOSIS — N76.0 ACUTE VAGINITIS: ICD-10-CM

## 2017-12-04 DIAGNOSIS — L98.9 CHANGING SKIN LESION: ICD-10-CM

## 2017-12-04 PROCEDURE — 99212 OFFICE O/P EST SF 10 MIN: CPT | Performed by: FAMILY MEDICINE

## 2017-12-04 PROCEDURE — 99213 OFFICE O/P EST LOW 20 MIN: CPT | Performed by: FAMILY MEDICINE

## 2017-12-04 RX ORDER — SOLIFENACIN SUCCINATE 10 MG/1
5 TABLET, FILM COATED ORAL DAILY
COMMUNITY
End: 2018-09-10

## 2017-12-04 ASSESSMENT — ENCOUNTER SYMPTOMS
ABDOMINAL PAIN: 0
PALPITATIONS: 0
ANOREXIA: 0
SORE THROAT: 0
VAGINITIS: 1
SPUTUM PRODUCTION: 0
CONSTIPATION: 0
DIARRHEA: 0
FLANK PAIN: 0
HEADACHES: 0
CHILLS: 0
NAUSEA: 0
BACK PAIN: 0
COUGH: 0
SHORTNESS OF BREATH: 0
FEVER: 0

## 2017-12-04 ASSESSMENT — PAIN SCALES - GENERAL: PAINLEVEL: NO PAIN

## 2017-12-04 NOTE — PROGRESS NOTES
Subjective:      Ambreen Higuera is a 47 y.o. female who presents with Referral Needed (dermatology) and Vaginitis            Vaginitis   The patient's pertinent negatives include no genital itching, genital lesions, genital odor, genital rash, missed menses, pelvic pain, vaginal bleeding or vaginal discharge. Primary symptoms comment: clear discharge. This is a recurrent problem. The current episode started 1 to 4 weeks ago (after starting vesicare). The problem occurs daily. The problem has been unchanged. The patient is experiencing no pain. She is not pregnant. Pertinent negatives include no abdominal pain, anorexia, back pain, chills, constipation, diarrhea, discolored urine, dysuria, fever, flank pain, frequency, headaches, hematuria, joint pain, joint swelling, nausea, painful intercourse, rash, sore throat or urgency. Nothing aggravates the symptoms. She has tried nothing for the symptoms. She is sexually active. No, her partner does not have an STD. She uses nothing for contraception. She is postmenopausal. Her past medical history is significant for vaginosis. There is no history of an abdominal surgery, a  section, an ectopic pregnancy, endometriosis, a gynecological surgery, herpes simplex, menorrhagia, metrorrhagia, miscarriage, perineal abscess, PID, an STD or a terminated pregnancy.   Rash   This is a recurrent problem. The current episode started more than 1 month ago. The problem has been gradually worsening since onset. The affected locations include the chest. Rash characteristics: skin lesion changing shape and color and size. Pertinent negatives include no anorexia, cough, diarrhea, fever, joint pain, shortness of breath or sore throat. Past treatments include nothing.       Review of Systems   Constitutional: Negative for chills and fever.   HENT: Negative for hearing loss, sore throat and tinnitus.    Respiratory: Negative for cough, sputum production and shortness of breath.   "  Cardiovascular: Negative for chest pain and palpitations.   Gastrointestinal: Negative for abdominal pain, anorexia, constipation, diarrhea and nausea.   Genitourinary: Negative for dysuria, flank pain, frequency, hematuria, menorrhagia, missed menses, pelvic pain, urgency and vaginal discharge.        Clear vaginal discharge   Musculoskeletal: Negative for back pain and joint pain.   Skin: Negative for rash.   Neurological: Negative for headaches.          Objective:     /78   Pulse 64   Temp 36.3 °C (97.4 °F)   Resp 20   Ht 1.575 m (5' 2\")   Wt 85.7 kg (189 lb)   LMP 03/08/2001   SpO2 96%   BMI 34.57 kg/m²      Physical Exam   Constitutional: She is oriented to person, place, and time.   BMI 34.57   HENT:   Head: Normocephalic and atraumatic.   Cardiovascular: Normal rate, regular rhythm and normal heart sounds.  Exam reveals no friction rub.    No murmur heard.  Pulmonary/Chest: Effort normal and breath sounds normal. No respiratory distress. She has no wheezes.   Abdominal: Soft. Bowel sounds are normal. She exhibits no distension. There is no tenderness.   Neurological: She is alert and oriented to person, place, and time.   Skin:   Skin lesion on chest wall, about 1cm in diameter, darker than surrounding skin, slight irregular shape   Nursing note and vitals reviewed.              Assessment/Plan:   1. Changing skin lesion  Will refer back to dermatology, pt had skin lesions removed in the past and is concerned about skin cancer due to fair complexion and excessive sun exposure in the past.    2. Acute vaginitis  Will have her continue to use her medications as directed. If her sxs persist or worsen will further evaluate and start pt on medications.        "

## 2018-01-03 RX ORDER — MONTELUKAST SODIUM 10 MG/1
TABLET ORAL
Qty: 90 TAB | Refills: 0 | Status: SHIPPED | OUTPATIENT
Start: 2018-01-03 | End: 2018-03-24 | Stop reason: SDUPTHER

## 2018-01-03 NOTE — TELEPHONE ENCOUNTER
Was the patient seen in the last year in this department? Yes     Does patient have an active prescription for medications requested? Yes     Received Request Via: Pharmacy   Future Appointments       Provider Department Center    1/23/2018 9:00 AM Darius Santos M.D[facility] [address] [place] [address]

## 2018-01-08 NOTE — TELEPHONE ENCOUNTER
Was the patient seen in the last year in this department? Yes     Does patient have an active prescription for medications requested? Yes     Received Request Via: Pharmacy   Future Appointments       Provider Department Center    1/23/2018 9:00 AM Darius Santos M.D[facility] [address] [place] [address]

## 2018-01-23 ENCOUNTER — OFFICE VISIT (OUTPATIENT)
Dept: BEHAVIORAL HEALTH | Facility: PHYSICIAN GROUP | Age: 48
End: 2018-01-23
Payer: COMMERCIAL

## 2018-01-23 VITALS
HEIGHT: 62 IN | SYSTOLIC BLOOD PRESSURE: 128 MMHG | DIASTOLIC BLOOD PRESSURE: 76 MMHG | BODY MASS INDEX: 34.6 KG/M2 | WEIGHT: 188 LBS | HEART RATE: 80 BPM | RESPIRATION RATE: 16 BRPM | TEMPERATURE: 97.9 F

## 2018-01-23 DIAGNOSIS — F43.12 CHRONIC POST-TRAUMATIC STRESS DISORDER (PTSD): ICD-10-CM

## 2018-01-23 DIAGNOSIS — F41.0 PANIC DISORDER: ICD-10-CM

## 2018-01-23 DIAGNOSIS — F31.81 BIPOLAR 2 DISORDER (HCC): ICD-10-CM

## 2018-01-23 PROCEDURE — 90833 PSYTX W PT W E/M 30 MIN: CPT | Performed by: PSYCHIATRY & NEUROLOGY

## 2018-01-23 PROCEDURE — 99213 OFFICE O/P EST LOW 20 MIN: CPT | Performed by: PSYCHIATRY & NEUROLOGY

## 2018-01-23 RX ORDER — CLONAZEPAM 1 MG/1
1 TABLET ORAL
Qty: 30 TAB | Refills: 2 | Status: SHIPPED
Start: 2018-01-23 | End: 2018-02-23

## 2018-01-23 ASSESSMENT — PATIENT HEALTH QUESTIONNAIRE - PHQ9
5. POOR APPETITE OR OVEREATING: 1
SUM OF ALL RESPONSES TO PHQ QUESTIONS 1-9: 6
SUM OF ALL RESPONSES TO PHQ9 QUESTIONS 1 AND 2: 2
7. TROUBLE CONCENTRATING ON THINGS, SUCH AS READING THE NEWSPAPER OR WATCHING TELEVISION: 0
1. LITTLE INTEREST OR PLEASURE IN DOING THINGS: 1
2. FEELING DOWN, DEPRESSED, IRRITABLE, OR HOPELESS: 1
4. FEELING TIRED OR HAVING LITTLE ENERGY: 1
8. MOVING OR SPEAKING SO SLOWLY THAT OTHER PEOPLE COULD HAVE NOTICED. OR THE OPPOSITE, BEING SO FIGETY OR RESTLESS THAT YOU HAVE BEEN MOVING AROUND A LOT MORE THAN USUAL: 0
6. FEELING BAD ABOUT YOURSELF - OR THAT YOU ARE A FAILURE OR HAVE LET YOURSELF OR YOUR FAMILY DOWN: 1
9. THOUGHTS THAT YOU WOULD BE BETTER OFF DEAD, OR OF HURTING YOURSELF: 0
3. TROUBLE FALLING OR STAYING ASLEEP OR SLEEPING TOO MUCH: 1

## 2018-01-23 NOTE — PROGRESS NOTES
"[facility]  PSYCHIATRIC FOLLOW-UP NOTE    Name: Ambreen Higuera  MRN: [id number]  : 1970  Age: 47 y.o.  Date of assessment: 2018  PCP: Daniel Brand M.D.  Persons in attendance: Patient  REASON FOR VISIT/CHIEF COMPLAINT (as stated by Patient):  Ambreen Higuera is a 47 y.o., White female, attending follow-up appointment for   Chief Complaint   Patient presents with   • Medication Management     I have tremor on hands when I am resting. I am doing well emotionally.   .      HISTORY OF PRESENT ILLNESS:    This is 48 yo female, unemployed, lives with her boyfriend, seen today for follow up. The patient reported that she has mild tremor in her hands after increasing the dose of abilify. The patient is taking clonazepam 1 mg at night and it is helping her with anxiety and sleep. The patient is not taking any opioids. The patient is going to mental health clinic in [place].    PSYCHOSOCIAL CHANGES SINCE PREVIOUS CONTACT:  The patients anxiety and depression improved.    RESPONSE TO TREATMENT:  She is on abilify 20 mg per day and bupropion  mg per day, clonazepam 1 mg one at night.    MEDICATION SIDE EFFECTS:  Mild hand tremor.    REVIEW OF SYSTEMS:        Constitutional positive - obesity   Eyes negative   Ears/Nose/Mouth/Throat negative   Cardiovascular negative   Respiratory positive - asthma   Gastrointestinal negative   Genitourinary negative   Muscular negative   Integumentary negative   Neurological positive - chronic back pain   Endocrine positive - hyperlipidemia.   Hematologic/Lymphatic positive - hep C.       PSYCHIATRIC EXAMINATION/MENTAL STATUS  /76   Pulse 80   Temp 36.6 °C (97.9 °F)   Resp 16   Ht 1.575 m (5' 2.01\")   Wt 85.3 kg (188 lb)   LMP 2001   BMI 34.38 kg/m²   Participation: Active verbal participation, Attentive and Engaged  Grooming:Neat  Orientation: Alert and Fully Oriented  Eye contact: Good  Behavior:Calm   Mood: Anxious  Affect: Flexible " and Full range  Thought process: Logical and Goal-directed  Thought content:  Within normal limits  Speech: Rate within normal limits and Volume within normal limits  Perception:  Within normal limits  Memory:  No gross evidence of memory deficits  Insight: Good  Judgment: Good  Family/couple interaction observations:   Other:    Current risk:    Suicide: Low   Homicide: Low   Self-harm: Low  Relapse: Low  Other:   Crisis Safety Plan reviewed?Yes  If evidence of imminent risk is present, intervention/plan:    Medical Records/Labs/Diagnostic Tests Reviewed: yes.    Medical Records/Labs/Diagnostic Tests Ordered: none.    DIAGNOSTIC IMPRESSION(S):  1. Panic disorder    2. Bipolar 2 disorder (CMS-HCC)    3. Chronic post-traumatic stress disorder (PTSD)           ASSESSMENT AND PLAN:  1. panic disorder.  Clonazepam 1 mg per day # 30 refills two.    2. Bipolar 2.   Decrease abiliy 10 mg per day side effects.    3. PTSD  Bupropion  mg per day    4. Follow up in three months.    16 minutes were spent in psychotherapy.  (If greater than 16 minutes, add [id number] code)   Topics addressed in psychotherapy include:  Keeping a daily routine and a good sleep cycle.  Improving her self esteem  Changing her negative automatic thoughts.  Darius Santos M.D.

## 2018-02-15 ENCOUNTER — OFFICE VISIT (OUTPATIENT)
Dept: MEDICAL GROUP | Facility: MEDICAL CENTER | Age: 48
End: 2018-02-15
Attending: FAMILY MEDICINE
Payer: MEDICAID

## 2018-02-15 VITALS
DIASTOLIC BLOOD PRESSURE: 85 MMHG | BODY MASS INDEX: 35.15 KG/M2 | HEART RATE: 72 BPM | OXYGEN SATURATION: 94 % | TEMPERATURE: 97 F | WEIGHT: 191 LBS | HEIGHT: 62 IN | RESPIRATION RATE: 14 BRPM | SYSTOLIC BLOOD PRESSURE: 140 MMHG

## 2018-02-15 DIAGNOSIS — R73.9 HYPERGLYCEMIA: ICD-10-CM

## 2018-02-15 DIAGNOSIS — J32.0 MAXILLARY SINUSITIS, UNSPECIFIED CHRONICITY: ICD-10-CM

## 2018-02-15 LAB
HBA1C MFR BLD: 5.6 % (ref ?–5.8)
INT CON NEG: NEGATIVE
INT CON POS: POSITIVE

## 2018-02-15 PROCEDURE — 83036 HEMOGLOBIN GLYCOSYLATED A1C: CPT | Performed by: FAMILY MEDICINE

## 2018-02-15 PROCEDURE — 99212 OFFICE O/P EST SF 10 MIN: CPT | Performed by: FAMILY MEDICINE

## 2018-02-15 PROCEDURE — 99214 OFFICE O/P EST MOD 30 MIN: CPT | Performed by: FAMILY MEDICINE

## 2018-02-15 RX ORDER — AMOXICILLIN AND CLAVULANATE POTASSIUM 875; 125 MG/1; MG/1
1 TABLET, FILM COATED ORAL 2 TIMES DAILY
Qty: 7 TAB | Refills: 1 | Status: SHIPPED | OUTPATIENT
Start: 2018-02-15 | End: 2018-06-11

## 2018-02-15 ASSESSMENT — ENCOUNTER SYMPTOMS
FEVER: 1
SWOLLEN GLANDS: 1
HOARSE VOICE: 0
SINUS PRESSURE: 1
COUGH: 1
SENSORY CHANGE: 0
SORE THROAT: 1
SHORTNESS OF BREATH: 0
FOCAL WEAKNESS: 0
BACK PAIN: 0
SPEECH CHANGE: 0
PALPITATIONS: 0
NECK PAIN: 0
HEADACHES: 1
TREMORS: 0
DIAPHORESIS: 1
TINGLING: 0
SPUTUM PRODUCTION: 0
CHILLS: 1
VOMITING: 0
ABDOMINAL PAIN: 0
NAUSEA: 0
SINUS PAIN: 1

## 2018-02-15 NOTE — PROGRESS NOTES
"Subjective:      Ambreen Higuera is a 47 y.o. female who presents with Sinus Problem (x3 weeks) and Rash (on eyelids)            Patient states that she has been taking her metformin as directed. She would like to have her hemoglobin A1c checked today. We'll have her continue to take medication as directed. She has any worsening of her current signs or symptoms of return to clinic or go to emergency room for further evaluation and management.      Sinusitis   This is a recurrent problem. The current episode started more than 1 month ago. The problem has been gradually worsening since onset. The maximum temperature recorded prior to her arrival was 100.4 - 100.9 F. Her pain is at a severity of 7/10. Associated symptoms include chills, congestion, coughing, diaphoresis, headaches, sinus pressure, a sore throat and swollen glands. Pertinent negatives include no ear pain, hoarse voice, neck pain, shortness of breath or sneezing. Past treatments include saline sprays (will have her try augmentin and will get a maxillofacial CT).       Review of Systems   Constitutional: Positive for chills, diaphoresis, fever and malaise/fatigue.   HENT: Positive for congestion, sinus pain, sinus pressure and sore throat. Negative for ear pain, hearing loss, hoarse voice, sneezing and tinnitus.    Respiratory: Positive for cough. Negative for sputum production and shortness of breath.    Cardiovascular: Negative for chest pain and palpitations.   Gastrointestinal: Negative for abdominal pain, nausea and vomiting.   Musculoskeletal: Negative for back pain, joint pain and neck pain.   Neurological: Positive for headaches. Negative for tingling, tremors, sensory change, speech change and focal weakness.          Objective:     /85   Pulse 72   Temp 36.1 °C (97 °F)   Resp 14   Ht 1.575 m (5' 2\")   Wt 86.6 kg (191 lb)   LMP 03/08/2001   SpO2 94%   BMI 34.93 kg/m²      Physical Exam   Constitutional: She is oriented to " person, place, and time.   BMI 34   HENT:   Head: Normocephalic and atraumatic.   Maxillary tenderness L > R, congestion   Eyes:   No visible eye lid rash at this time   Neck: Normal range of motion. Neck supple. No thyromegaly present.   Cardiovascular: Normal rate, regular rhythm and normal heart sounds.  Exam reveals no friction rub.    No murmur heard.  Pulmonary/Chest: Effort normal and breath sounds normal. No respiratory distress. She has no wheezes. She has no rales.   Abdominal: Soft. Bowel sounds are normal. She exhibits no distension. There is no tenderness.   Lymphadenopathy:     She has no cervical adenopathy.   Neurological: She is alert and oriented to person, place, and time.   Skin: Skin is warm and dry.   Psychiatric: She has a normal mood and affect. Her behavior is normal.   Nursing note and vitals reviewed.              Assessment/Plan:     1. Maxillary sinusitis, unspecified chronicity  Will have her continue to use her nasal saline as directed and will order augmentin for her to start as directed. Will order a maxillofacial CT for a further evaluation. Will continue to follow.   - CT-MAXILLOFACIAL W/O PLUS RECONS; Future    2. Hyperglycemia  Will check a hemoglobin A1c and discussed avoiding high glycemic index foods. Will have her continue to use metformin. Will continue to follow.

## 2018-02-20 ENCOUNTER — TELEPHONE (OUTPATIENT)
Dept: MEDICAL GROUP | Facility: MEDICAL CENTER | Age: 48
End: 2018-02-20

## 2018-02-20 DIAGNOSIS — Z12.31 SCREENING MAMMOGRAM, ENCOUNTER FOR: ICD-10-CM

## 2018-02-20 NOTE — TELEPHONE ENCOUNTER
1. Caller Name: michael                                         Call Back Number: [phone] (home)         Patient approves a detailed voicemail message: N\A    Patient is requesting an order for mammogram. Please advise.

## 2018-02-22 ENCOUNTER — APPOINTMENT (OUTPATIENT)
Dept: RADIOLOGY | Facility: MEDICAL CENTER | Age: 48
End: 2018-02-22
Attending: FAMILY MEDICINE
Payer: MEDICAID

## 2018-02-23 ENCOUNTER — APPOINTMENT (OUTPATIENT)
Dept: RADIOLOGY | Facility: MEDICAL CENTER | Age: 48
End: 2018-02-23
Attending: FAMILY MEDICINE
Payer: MEDICAID

## 2018-02-26 ENCOUNTER — APPOINTMENT (OUTPATIENT)
Dept: RADIOLOGY | Facility: MEDICAL CENTER | Age: 48
End: 2018-02-26
Attending: FAMILY MEDICINE
Payer: MEDICAID

## 2018-02-28 ENCOUNTER — HOSPITAL ENCOUNTER (OUTPATIENT)
Dept: RADIOLOGY | Facility: MEDICAL CENTER | Age: 48
End: 2018-02-28
Attending: FAMILY MEDICINE
Payer: MEDICAID

## 2018-02-28 DIAGNOSIS — J32.0 MAXILLARY SINUSITIS, UNSPECIFIED CHRONICITY: ICD-10-CM

## 2018-02-28 PROCEDURE — 70486 CT MAXILLOFACIAL W/O DYE: CPT

## 2018-03-03 ENCOUNTER — HOSPITAL ENCOUNTER (OUTPATIENT)
Dept: RADIOLOGY | Facility: MEDICAL CENTER | Age: 48
End: 2018-03-03
Attending: FAMILY MEDICINE
Payer: MEDICAID

## 2018-03-03 DIAGNOSIS — Z12.31 SCREENING MAMMOGRAM, ENCOUNTER FOR: ICD-10-CM

## 2018-03-03 PROCEDURE — 77067 SCR MAMMO BI INCL CAD: CPT

## 2018-03-05 ENCOUNTER — TELEPHONE (OUTPATIENT)
Dept: MEDICAL GROUP | Facility: MEDICAL CENTER | Age: 48
End: 2018-03-05

## 2018-03-05 ENCOUNTER — OFFICE VISIT (OUTPATIENT)
Dept: MEDICAL GROUP | Facility: MEDICAL CENTER | Age: 48
End: 2018-03-05
Attending: FAMILY MEDICINE
Payer: MEDICAID

## 2018-03-05 VITALS
SYSTOLIC BLOOD PRESSURE: 140 MMHG | HEIGHT: 62 IN | RESPIRATION RATE: 14 BRPM | DIASTOLIC BLOOD PRESSURE: 75 MMHG | TEMPERATURE: 96.5 F | OXYGEN SATURATION: 92 % | HEART RATE: 76 BPM | BODY MASS INDEX: 35.15 KG/M2 | WEIGHT: 191 LBS

## 2018-03-05 DIAGNOSIS — L30.9 ECZEMA, UNSPECIFIED TYPE: ICD-10-CM

## 2018-03-05 DIAGNOSIS — R92.8 MAMMOGRAM ABNORMAL: ICD-10-CM

## 2018-03-05 DIAGNOSIS — J01.00 SUBACUTE MAXILLARY SINUSITIS: ICD-10-CM

## 2018-03-05 DIAGNOSIS — R92.8 ABNORMAL FINDING ON MAMMOGRAPHY: ICD-10-CM

## 2018-03-05 PROBLEM — R05.9 COUGH: Status: RESOLVED | Noted: 2017-01-25 | Resolved: 2018-03-05

## 2018-03-05 PROBLEM — L98.9 SKIN LESION: Status: RESOLVED | Noted: 2017-01-25 | Resolved: 2018-03-05

## 2018-03-05 PROCEDURE — 99212 OFFICE O/P EST SF 10 MIN: CPT | Performed by: FAMILY MEDICINE

## 2018-03-05 PROCEDURE — 99214 OFFICE O/P EST MOD 30 MIN: CPT | Performed by: FAMILY MEDICINE

## 2018-03-05 RX ORDER — AMMONIUM LACTATE 12 G/100G
1 LOTION TOPICAL PRN
Qty: 1 BOTTLE | Refills: 3 | Status: SHIPPED | OUTPATIENT
Start: 2018-03-05 | End: 2018-06-11

## 2018-03-05 ASSESSMENT — ENCOUNTER SYMPTOMS
RHINORRHEA: 1
SENSORY CHANGE: 0
PALPITATIONS: 0
SPEECH CHANGE: 0
FEVER: 0
COUGH: 1
ABDOMINAL PAIN: 0
HEADACHES: 1
BACK PAIN: 1
TINGLING: 1
HOARSE VOICE: 0
VOMITING: 0
SWOLLEN GLANDS: 0
SHORTNESS OF BREATH: 0
TREMORS: 0
SORE THROAT: 0
BLURRED VISION: 0
NECK PAIN: 0
EYE PAIN: 0
CHILLS: 0
DOUBLE VISION: 0
SINUS PRESSURE: 1
NAUSEA: 0
SPUTUM PRODUCTION: 0

## 2018-03-05 NOTE — PROGRESS NOTES
Subjective:      Ambreen Higuera is a 47 y.o. female who presents with Follow-Up (labs) and Rash (on eyelids x3 weeks)            The results of her recent mammogram as well as maxillofacial CT are as follows:    1.  Left upper outer quadrant architectural distortion.    2.  Recommend further evaluation with diagnostic mammogram and probably ultrasound    Since there were some distortion of her screening mammogram a diagnostic mammogram has been ordered as well as an ultrasound to further assess the distortions noted on her screening mammogram. We'll continue to follow.    R0- Category 0:  Need Additional Imaging Evaluation and/or Prior Mammogram for Comparison    The paranasal sinuses and mastoid air cells are clear. No air-fluid levels are identified. No periosteal thickening. The ostiomeatal complexes are patent. There is mild septal deviation towards the left. Soft tissues are unremarkable.    Will have her continue to use her antibiotic and nasal sprays as directed for her sinus symptoms. Discussed an ENT referral with patient, but patient would like to wait on that. We'll continue to follow.      Rash   This is a recurrent problem. The current episode started more than 1 month ago. The affected locations include the face. The rash is characterized by itchiness and dryness. She was exposed to nothing. Associated symptoms include congestion, coughing and rhinorrhea. Pertinent negatives include no eye pain, facial edema, fever, shortness of breath, sore throat or vomiting. Past treatments include antibiotic cream (will have her try moisturizing and continuing her other meds as directed).   Sinusitis   This is a recurrent problem. The current episode started more than 1 month ago. The problem is unchanged. There has been no fever. Associated symptoms include congestion, coughing, headaches, sinus pressure and sneezing. Pertinent negatives include no chills, ear pain, hoarse voice, neck pain, shortness of  "breath, sore throat or swollen glands. Past treatments include oral decongestants, saline sprays and antibiotics (reviewed results of her recent CT). The treatment provided mild relief.       Review of Systems   Constitutional: Negative for chills and fever.   HENT: Positive for congestion, rhinorrhea, sinus pressure and sneezing. Negative for ear pain, hearing loss, hoarse voice, sore throat and tinnitus.    Eyes: Negative for blurred vision, double vision and pain.   Respiratory: Positive for cough. Negative for sputum production and shortness of breath.    Cardiovascular: Negative for chest pain and palpitations.   Gastrointestinal: Negative for abdominal pain, nausea and vomiting.   Musculoskeletal: Positive for back pain. Negative for neck pain.   Skin: Positive for rash.   Neurological: Positive for tingling and headaches. Negative for tremors, sensory change and speech change.          Objective:     /75   Pulse 76   Temp 35.8 °C (96.5 °F)   Resp 14   Ht 1.575 m (5' 2\")   Wt 86.6 kg (191 lb)   LMP 03/08/2001   SpO2 92%   BMI 34.93 kg/m²      Physical Exam   Constitutional: She is oriented to person, place, and time.   BMI 34   HENT:   Head: Normocephalic and atraumatic.   Congestion with sinus tenderness   Eyes: Conjunctivae and EOM are normal. Pupils are equal, round, and reactive to light.   Neck: Normal range of motion. Neck supple.   Cardiovascular: Normal rate, regular rhythm and normal heart sounds.  Exam reveals no friction rub.    No murmur heard.  Pulmonary/Chest: Effort normal and breath sounds normal. No respiratory distress. She has no wheezes. She has no rales.   Abdominal: Soft. Bowel sounds are normal. She exhibits no distension. There is no tenderness.   Musculoskeletal: Normal range of motion.   Lymphadenopathy:     She has no cervical adenopathy.   Neurological: She is alert and oriented to person, place, and time.   Skin: Skin is warm and dry.   Nursing note and vitals " reviewed.              Assessment/Plan:     1. Eczema, unspecified type  Will have her try Lac-Hydrin to moisturize her skin. We'll continue to follow.  - ammonium lactate (LAC-HYDRIN) 12 % Lotion; Apply 1 Application to affected area(s) as needed.  Dispense: 1 Bottle; Refill: 3    2. BMI 34.0-34.9,adult  Discussed diet and exercise to lower BMI.  - Patient identified as having weight management issue.  Appropriate orders and counseling given.    3. Abnormal finding on mammography  Will have her get a diagnostic mammogram as well as ultrasound to further assess the abnormal architecture noted on her left breast mammogram. We'll continue to follow.    4. Subacute maxillary sinusitis  Reviewed the results of recent maxillofacial CT. We'll have her continue to use her medication as previously directed. We'll continue to follow.

## 2018-03-08 DIAGNOSIS — I10 ESSENTIAL HYPERTENSION: ICD-10-CM

## 2018-03-13 ENCOUNTER — HOSPITAL ENCOUNTER (OUTPATIENT)
Dept: RADIOLOGY | Facility: MEDICAL CENTER | Age: 48
End: 2018-03-13
Attending: FAMILY MEDICINE
Payer: MEDICAID

## 2018-03-13 DIAGNOSIS — R92.8 ABNORMAL MAMMOGRAM OF LEFT BREAST: ICD-10-CM

## 2018-03-13 DIAGNOSIS — R92.8 BREAST LESION ON MAMMOGRAPHY: ICD-10-CM

## 2018-03-13 PROCEDURE — 76642 ULTRASOUND BREAST LIMITED: CPT | Mod: LT

## 2018-03-13 PROCEDURE — 77065 DX MAMMO INCL CAD UNI: CPT | Mod: LT

## 2018-03-20 ENCOUNTER — TELEPHONE (OUTPATIENT)
Dept: RADIOLOGY | Facility: MEDICAL CENTER | Age: 48
End: 2018-03-20

## 2018-03-20 NOTE — TELEPHONE ENCOUNTER
Spoke w/ pt to conf apt @ [facility] on 3/21 @ 8:45 check in @ 8:15, reviewed prep and location

## 2018-03-21 ENCOUNTER — HOSPITAL ENCOUNTER (OUTPATIENT)
Dept: RADIOLOGY | Facility: MEDICAL CENTER | Age: 48
End: 2018-03-21
Attending: FAMILY MEDICINE
Payer: MEDICAID

## 2018-03-21 DIAGNOSIS — R92.8 ABNORMAL FINDINGS ON DIAGNOSTIC IMAGING OF BREAST: ICD-10-CM

## 2018-03-21 PROCEDURE — 19081 BX BREAST 1ST LESION STRTCTC: CPT

## 2018-03-21 PROCEDURE — 88342 IMHCHEM/IMCYTCHM 1ST ANTB: CPT

## 2018-03-21 PROCEDURE — 88305 TISSUE EXAM BY PATHOLOGIST: CPT

## 2018-03-22 DIAGNOSIS — D36.9 INTRADUCTAL PAPILLOMA: ICD-10-CM

## 2018-03-23 ENCOUNTER — TELEPHONE (OUTPATIENT)
Dept: RADIOLOGY | Facility: MEDICAL CENTER | Age: 48
End: 2018-03-23

## 2018-03-26 RX ORDER — MONTELUKAST SODIUM 10 MG/1
TABLET ORAL
Qty: 90 TAB | Refills: 0 | Status: SHIPPED | OUTPATIENT
Start: 2018-03-26 | End: 2018-07-13 | Stop reason: SDUPTHER

## 2018-03-29 DIAGNOSIS — E78.5 HYPERLIPIDEMIA, UNSPECIFIED HYPERLIPIDEMIA TYPE: ICD-10-CM

## 2018-04-02 ENCOUNTER — TELEPHONE (OUTPATIENT)
Dept: MEDICAL GROUP | Facility: MEDICAL CENTER | Age: 48
End: 2018-04-02

## 2018-04-02 DIAGNOSIS — E78.5 HYPERLIPIDEMIA, UNSPECIFIED HYPERLIPIDEMIA TYPE: ICD-10-CM

## 2018-04-02 RX ORDER — ATORVASTATIN CALCIUM 10 MG/1
10 TABLET, FILM COATED ORAL DAILY
Qty: 90 TAB | Refills: 1 | Status: SHIPPED | OUTPATIENT
Start: 2018-04-02 | End: 2018-09-10

## 2018-04-02 RX ORDER — ATORVASTATIN CALCIUM 10 MG/1
TABLET, FILM COATED ORAL
Qty: 30 TAB | Refills: 11 | Status: SHIPPED | OUTPATIENT
Start: 2018-04-02 | End: 2018-04-02 | Stop reason: SDUPTHER

## 2018-06-11 DIAGNOSIS — Z01.812 PRE-OPERATIVE LABORATORY EXAMINATION: ICD-10-CM

## 2018-06-11 DIAGNOSIS — Z01.810 PRE-OPERATIVE CARDIOVASCULAR EXAMINATION: ICD-10-CM

## 2018-06-11 LAB
ANION GAP SERPL CALC-SCNC: 11 MMOL/L (ref 0–11.9)
BUN SERPL-MCNC: 11 MG/DL (ref 8–22)
CALCIUM SERPL-MCNC: 9.8 MG/DL (ref 8.5–10.5)
CHLORIDE SERPL-SCNC: 104 MMOL/L (ref 96–112)
CO2 SERPL-SCNC: 26 MMOL/L (ref 20–33)
CREAT SERPL-MCNC: 0.97 MG/DL (ref 0.5–1.4)
EKG IMPRESSION: NORMAL
GLUCOSE SERPL-MCNC: 128 MG/DL (ref 65–99)
POTASSIUM SERPL-SCNC: 4 MMOL/L (ref 3.6–5.5)
SODIUM SERPL-SCNC: 141 MMOL/L (ref 135–145)

## 2018-06-11 PROCEDURE — 93005 ELECTROCARDIOGRAM TRACING: CPT

## 2018-06-11 PROCEDURE — 36415 COLL VENOUS BLD VENIPUNCTURE: CPT

## 2018-06-11 PROCEDURE — 93010 ELECTROCARDIOGRAM REPORT: CPT | Performed by: INTERNAL MEDICINE

## 2018-06-11 PROCEDURE — 80048 BASIC METABOLIC PNL TOTAL CA: CPT

## 2018-06-15 ENCOUNTER — APPOINTMENT (OUTPATIENT)
Dept: RADIOLOGY | Facility: MEDICAL CENTER | Age: 48
End: 2018-06-15
Attending: SURGERY
Payer: MEDICAID

## 2018-06-15 ENCOUNTER — HOSPITAL ENCOUNTER (OUTPATIENT)
Facility: MEDICAL CENTER | Age: 48
End: 2018-06-15
Attending: SURGERY | Admitting: SURGERY
Payer: MEDICAID

## 2018-06-15 VITALS
HEIGHT: 62 IN | OXYGEN SATURATION: 95 % | HEART RATE: 64 BPM | TEMPERATURE: 97.1 F | BODY MASS INDEX: 35.17 KG/M2 | RESPIRATION RATE: 18 BRPM | WEIGHT: 191.14 LBS

## 2018-06-15 DIAGNOSIS — N60.92 BENIGN MAMMARY DYSPLASIA OF LEFT BREAST: ICD-10-CM

## 2018-06-15 PROCEDURE — 700102 HCHG RX REV CODE 250 W/ 637 OVERRIDE(OP)

## 2018-06-15 PROCEDURE — 700111 HCHG RX REV CODE 636 W/ 250 OVERRIDE (IP)

## 2018-06-15 PROCEDURE — A6402 STERILE GAUZE <= 16 SQ IN: HCPCS | Performed by: SURGERY

## 2018-06-15 PROCEDURE — A9270 NON-COVERED ITEM OR SERVICE: HCPCS

## 2018-06-15 PROCEDURE — 88307 TISSUE EXAM BY PATHOLOGIST: CPT | Mod: 59

## 2018-06-15 PROCEDURE — 160009 HCHG ANES TIME/MIN: Performed by: SURGERY

## 2018-06-15 PROCEDURE — 700101 HCHG RX REV CODE 250

## 2018-06-15 PROCEDURE — 76098 X-RAY EXAM SURGICAL SPECIMEN: CPT | Mod: LT

## 2018-06-15 PROCEDURE — 160041 HCHG SURGERY MINUTES - EA ADDL 1 MIN LEVEL 4: Performed by: SURGERY

## 2018-06-15 PROCEDURE — 500122 HCHG BOVIE, BLADE: Performed by: SURGERY

## 2018-06-15 PROCEDURE — 160002 HCHG RECOVERY MINUTES (STAT): Performed by: SURGERY

## 2018-06-15 PROCEDURE — 160048 HCHG OR STATISTICAL LEVEL 1-5: Performed by: SURGERY

## 2018-06-15 PROCEDURE — 19281 PERQ DEVICE BREAST 1ST IMAG: CPT | Mod: LT

## 2018-06-15 PROCEDURE — 501838 HCHG SUTURE GENERAL: Performed by: SURGERY

## 2018-06-15 PROCEDURE — 160036 HCHG PACU - EA ADDL 30 MINS PHASE I: Performed by: SURGERY

## 2018-06-15 PROCEDURE — 160029 HCHG SURGERY MINUTES - 1ST 30 MINS LEVEL 4: Performed by: SURGERY

## 2018-06-15 PROCEDURE — 160035 HCHG PACU - 1ST 60 MINS PHASE I: Performed by: SURGERY

## 2018-06-15 RX ORDER — SODIUM CHLORIDE, SODIUM LACTATE, POTASSIUM CHLORIDE, CALCIUM CHLORIDE 600; 310; 30; 20 MG/100ML; MG/100ML; MG/100ML; MG/100ML
INJECTION, SOLUTION INTRAVENOUS CONTINUOUS
Status: DISCONTINUED | OUTPATIENT
Start: 2018-06-15 | End: 2018-06-15 | Stop reason: HOSPADM

## 2018-06-15 RX ORDER — LIDOCAINE AND PRILOCAINE 25; 25 MG/G; MG/G
CREAM TOPICAL
Status: COMPLETED
Start: 2018-06-15 | End: 2018-06-15

## 2018-06-15 RX ORDER — ALPRAZOLAM 0.25 MG/1
TABLET ORAL
Status: COMPLETED
Start: 2018-06-15 | End: 2018-06-15

## 2018-06-15 RX ORDER — ISOSULFAN BLUE 50 MG/5ML
INJECTION, SOLUTION SUBCUTANEOUS
Status: DISCONTINUED
Start: 2018-06-15 | End: 2018-06-15 | Stop reason: HOSPADM

## 2018-06-15 RX ORDER — BUPIVACAINE HYDROCHLORIDE AND EPINEPHRINE 5; 5 MG/ML; UG/ML
INJECTION, SOLUTION EPIDURAL; INTRACAUDAL; PERINEURAL
Status: DISCONTINUED
Start: 2018-06-15 | End: 2018-06-15 | Stop reason: HOSPADM

## 2018-06-15 RX ORDER — BUPIVACAINE HYDROCHLORIDE AND EPINEPHRINE 5; 5 MG/ML; UG/ML
INJECTION, SOLUTION PERINEURAL
Status: DISCONTINUED | OUTPATIENT
Start: 2018-06-15 | End: 2018-06-15 | Stop reason: HOSPADM

## 2018-06-15 RX ADMIN — LIDOCAINE AND PRILOCAINE 2.5 %: 25; 25 CREAM TOPICAL at 07:30

## 2018-06-15 RX ADMIN — SODIUM CHLORIDE, SODIUM LACTATE, POTASSIUM CHLORIDE, CALCIUM CHLORIDE: 600; 310; 30; 20 INJECTION, SOLUTION INTRAVENOUS at 08:05

## 2018-06-15 RX ADMIN — ALPRAZOLAM 0.25 MG: 0.25 TABLET ORAL at 07:25

## 2018-06-15 ASSESSMENT — PAIN SCALES - GENERAL
PAINLEVEL_OUTOF10: 0

## 2018-06-15 NOTE — OR NURSING
1008- Pt arrives from OR, dressing in place with no drainage to left out breast.  Pt drowsy but will awaken to voice.    1023- Weaning down oxygen, pt tolerating sips of water, denies pain.    1027- Pt's  brought to bedside.    1045- Pt up to bathroom, voided without difficulty. Pt placed in recliner.    1100- Pt sts she feels well enough to go home.  D/C instructions provided, no scripts.  All questions answered.    1110- IV removed, pt ambulated out with steady gait.

## 2018-06-15 NOTE — OP REPORT
Operative Report    Date: 6/15/2018    Surgeon: Dipika Ochoa M.D.    Assistant: MELISSA Farmer    Anesthesiologist: John STOCK    Pre-operative Diagnosis: D24.9 Benign neoplasm of unspecified breast (intraductal papilloma) LEFT    Post-operative Diagnosis: same     Procedure: left breast needle localized excisional biopsy    Indications: The patient is a 48 year old female who had an abnormality found on routine mammography. Biopsy revealed intraductal papilloma as well as atypical ductal hyperplasia. She was recommended to have an excisional bx. An extensive PARQ conference was held with the patient and her family, in regard to excisional biopsy. The patient was counseled regarding the benefits of the operation, namely, diagnosis and direction of management. The patient was made aware of the alternatives, including operative and non-operative: core biopsy, watchful waiting. The risks of bleeding, infection, damage to surrounding structures, need for reoperation, stroke, MI, and death were discussed with the patient. The patient was given a chance to ask questions, and all her questions were answered. The patient demonstrates adequate understanding, seems pleased with the plan, and wishes to proceed. Consent was signed.    Findings: left breast suspicious within excised needle localized specimen. No clip identified.    Procedure in detail: The patient was identified in the pre-operative holding area and brought to the operating room. Prior to the procedure, she underwent needle localization with radiology due to the non-palpable nature of the lesion.    Correct side and site were identified. Pre-operative antibiotics of ancef were administered prior to the procedure. Anesthesia was smoothly induced. The patient was prepped and draped in the usual sterile fashion.    The skin was infiltrated with local anesthetic and a curvilinear incision was made in the upper quadrant. The breast tissue was grasped with Allis  clamps and a core of tissue was removed around the localization wire. While excising the specimen the wire became dislodged. The specimen was then completely removed  marked for orientation, and was sent to Radiology for mammogram. The radiologist confirmed the architectual distortion to be within the excised specimen, but we did not see the clip.  Meticulous hemostasis was achieved with electrocautery. The area was irrigated and suctioned. The skin was closed in two layers with vicryl and monocryl. Sterile dressings were applied.     The patient was awakened from anesthetic, and was taken to the recovery room in stable condition.    Sponge and needle counts were correct at the end of the case.     Specimen:  left needle localized breast biopsy    EBL: minimal    Dispo: stable, extubated, to PACU    Dipika Ochoa M.D.  [facility]  [phone]

## 2018-06-15 NOTE — DISCHARGE INSTRUCTIONS
ACTIVITY: Rest and take it easy for the first 24 hours.  A responsible adult is recommended to remain with you during that time.  It is normal to feel sleepy.  We encourage you to not do anything that requires balance, judgment or coordination.    MILD FLU-LIKE SYMPTOMS ARE NORMAL. YOU MAY EXPERIENCE GENERALIZED MUSCLE ACHES, THROAT IRRITATION, HEADACHE AND/OR SOME NAUSEA.    FOR 24 HOURS DO NOT:  Drive, operate machinery or run household appliances.  Drink beer or alcoholic beverages.   Make important decisions or sign legal documents.    SPECIAL INSTRUCTIONS:     Discharge Instructions, Lumpectomy:     Care of Your Incisions:  • Leave the bandages on for 48 hours. You can shower with the dressing on as it is waterproof.  Avoid getting lotions, powders or deodorant on the incision while it is healing.  • There is no need to re-bandage the incisions after the first 48 hours, but it may be more comfortable to tuck a gauze pad inside your bra for the first week. You can buy 4 x 4 gauze dressings at your pharmacy.   • You may have thin paper strips across the incision. These are called Steri-Strips. When they start to curl at the edges, you can peel them off. It is okay to shower with these on.  • Don’t worry if the area under either incision feels firm or hard. This is normal and usually softens within a few months.     How to Manage Discomfort After Surgery:  • It is normal to have tenderness, discomfort or mild swelling at the site of the incisions.      You may also feel:  - Numbness at the incisions.  - Occasional shooting pains in the breast as you heal.     • You will be given a prescription for pain medication prior to being discharged from the hospital. If you are having pain, take the medication as directed by your physician and by the label directions. You should be comfortable and moving around. Most women use some prescription pain medication, though some need only over the counter pain medication, such  as ibuprofen (Motrin) or acetaminophen (Tylenol). Some women have little pain and take nothing at all. Whatever amount of pain you have, it is important to listen to your body and use the medication if needed during your recovery.     • Prescription pain medication may cause constipation. If you are having problems, use what you normally would or call your nurse for suggestions. It also helps to stay regular by including fiber in your diet (for example: bran or fruits and vegetables) and drink plenty of liquids (water, juice, etc.).     Activity:  • You may resume your normal routine, but avoid heavy lifting (over 10 pounds), pushing or pulling until your first post-operative visit, unless otherwise specified by your surgeon.  • Do not drive for at least 24-48 hours after surgery (unless otherwise directed by your surgeon), or while you are taking prescription pain medicine. Prescription pain medicine causes drowsiness (makes you sleepy) so you should avoid doing any tasks where you should be awake and alert (driving, operating machinery, etc).     When to Call Your Doctor/Nurse:  • If you have a fever greater than 100.5, increased pain, redness or warmth at the area around the incision, drainage from the incision or around the drain, or swelling of the arm. Call Dr. Ochoa's office at [phone] with any other questions or concerns.     You should have an appointment to see Dr. Ochoa about a week after surgery, call [phone] if you do not already have an appointment.    DIET: To avoid nausea, slowly advance diet as tolerated, avoiding spicy or greasy foods for the first day.  Add more substantial food to your diet according to your physician's instructions.  Babies can be fed formula or breast milk as soon as they are hungry.  INCREASE FLUIDS AND FIBER TO AVOID CONSTIPATION.    FOLLOW-UP APPOINTMENT:  A follow-up appointment should be arranged with your doctor in one week; call to schedule.    You should  CALL YOUR PHYSICIAN if you develop:  Fever greater than 101 degrees F.  Pain not relieved by medication, or persistent nausea or vomiting.  Excessive bleeding (blood soaking through dressing) or unexpected drainage from the wound.  Extreme redness or swelling around the incision site, drainage of pus or foul smelling drainage.  Inability to urinate or empty your bladder within 8 hours.  Problems with breathing or chest pain.    You should call 911 if you develop problems with breathing or chest pain.  If you are unable to contact your doctor or surgical center, you should go to the nearest emergency room or urgent care center.    Physician's telephone #: [phone] Dr Ochoa    If any questions arise, call your doctor.  If your doctor is not available, please feel free to call the Surgical Center at [phone].  The Center is open Monday through Friday from 7AM to 7PM.  You can also call the [email or web address] HOTLINE open 24 hours/day, 7 days/week and speak to a nurse at [phone], or toll free at [phone].    A registered nurse may call you a few days after your surgery to see how you are doing after your procedure.    MEDICATIONS: Resume taking daily medication.  Take prescribed pain medication with food.  If no medication is prescribed, you may take non-aspirin pain medication if needed.  PAIN MEDICATION CAN BE VERY CONSTIPATING.  Take a stool softener or laxative such as senokot, pericolace, or milk of magnesia if needed.      If your physician has prescribed pain medication that includes Acetaminophen (Tylenol), do not take additional Acetaminophen (Tylenol) while taking the prescribed medication.    Depression / Suicide Risk    As you are discharged from this Critical access hospital facility, it is important to learn how to keep safe from harming yourself.    Recognize the warning signs:  · Abrupt changes in personality, positive or negative- including increase in energy   · Giving away possessions  · Change in  eating patterns- significant weight changes-  positive or negative  · Change in sleeping patterns- unable to sleep or sleeping all the time   · Unwillingness or inability to communicate  · Depression  · Unusual sadness, discouragement and loneliness  · Talk of wanting to die  · Neglect of personal appearance   · Rebelliousness- reckless behavior  · Withdrawal from people/activities they love  · Confusion- inability to concentrate     If you or a loved one observes any of these behaviors or has concerns about self-harm, here's what you can do:  · Talk about it- your feelings and reasons for harming yourself  · Remove any means that you might use to hurt yourself (examples: pills, rope, extension cords, firearm)  · Get professional help from the community (Mental Health, Substance Abuse, psychological counseling)  · Do not be alone:Call your Safe Contact- someone whom you trust who will be there for you.  · Call your local CRISIS HOTLINE 820-3078 or 118-530-1534  · Call your local Children's Mobile Crisis Response Team [place] (845) 147-4334 or www.boolino  · Call the toll free National Suicide Prevention Hotlines   · National Suicide Prevention Lifeline 216-643-NZPL (0693)  · National Hope Line Network 800-SUICIDE (818-3991)

## 2018-06-15 NOTE — PROGRESS NOTES
Discharge Instructions, Lumpectomy:    Care of Your Incisions:  • Leave the bandages on for 48 hours. You can shower with the dressing on as it is waterproof.  Avoid getting lotions, powders or deodorant on the incision while it is healing.  • There is no need to re-bandage the incisions after the first 48 hours, but it may be more comfortable to tuck a gauze pad inside your bra for the first week. You can buy 4 x 4 gauze dressings at your pharmacy.   • You may have thin paper strips across the incision. These are called Steri-Strips. When they start to curl at the edges, you can peel them off. It is okay to shower with these on.  • Don’t worry if the area under either incision feels firm or hard. This is normal and usually softens within a few months.    How to Manage Discomfort After Surgery:  • It is normal to have tenderness, discomfort or mild swelling at the site of the incisions.     You may also feel:  - Numbness at the incisions.  - Occasional shooting pains in the breast as you heal.    • You will be given a prescription for pain medication prior to being discharged from the hospital. If you are having pain, take the medication as directed by your physician and by the label directions. You should be comfortable and moving around. Most women use some prescription pain medication, though some need only over the counter pain medication, such as ibuprofen (Motrin) or acetaminophen (Tylenol). Some women have little pain and take nothing at all. Whatever amount of pain you have, it is important to listen to your body and use the medication if needed during your recovery.    • Prescription pain medication may cause constipation. If you are having problems, use what you normally would or call your nurse for suggestions. It also helps to stay regular by including fiber in your diet (for example: bran or fruits and vegetables) and drink plenty of liquids (water, juice, etc.).    Activity:  • You may resume your  normal routine, but avoid heavy lifting (over 10 pounds), pushing or pulling until your first post-operative visit, unless otherwise specified by your surgeon.  • Do not drive for at least 24-48 hours after surgery (unless otherwise directed by your surgeon), or while you are taking prescription pain medicine. Prescription pain medicine causes drowsiness (makes you sleepy) so you should avoid doing any tasks where you should be awake and alert (driving, operating machinery, etc).    When to Call Your Doctor/Nurse:  • If you have a fever greater than 100.5, increased pain, redness or warmth at the area around the incision, drainage from the incision or around the drain, or swelling of the arm. Call Dr. Ochoa's office at [phone] with any other questions or concerns.    You should have an appointment to see Dr. Ochoa about a week after surgery, call [phone] if you do not already have an appointment.    Office address:  [address] [place] [address] [id number]  [place] [id number]      Dipika Ochoa M.D.  [facility]  [phone]

## 2018-06-15 NOTE — H&P
"Surgical History and Physical    Date: 6/15/2018    PCP: Daniel Brand M.D.  Attending Physician: Dipika Ochoa M.D. [facility]    CC: \"here for surgery\"    HPI: This is a 48 y.o. female with an abnormal mammogram, biopsy revealed intraductal papilloma and atypical ductal hyperplasia, here for wire localized excision.    Past Medical History:   Diagnosis Date   • Anesthesia     n/v   • ASTHMA 8/28/2009   • Bipolar disorder (HCC)    • Cancer (HCC)     cervical and uterine   • Cataract    • Depression with anxiety 2/10/2010   • Diabetes (HCC)    • Heart burn     neck 2-7/10   • Hemorrhagic disorder (HCC)    • Hepatitis C    • Hypertension    • Indigestion    • Other specified symptom associated with female genital organs    • Psychiatric disorder     depression   • Unspecified urinary incontinence    • Urinary bladder disorder     chronic cystitis       Past Surgical History:   Procedure Laterality Date   • INJ,EPIDURAL,CERV/THOR SINGLE  10/22/2014    Performed by Miles Gaspar M.D. at [facility]   • CHOLECYSTECTOMY     • GYN SURGERY      hysterectomy   • GYN SURGERY      cervix removal   • OTHER ORTHOPEDIC SURGERY      c spine fusion   • US-NEEDLE CORE BX-BREAST PANEL         Current Facility-Administered Medications   Medication Dose Route Frequency Provider Last Rate Last Dose   • lactated ringers infusion   Intravenous Continuous Dipika Ochoa M.D. 10 mL/hr at 06/15/18 0805         Social History     Social History   • Marital status: Single     Spouse name: N/A   • Number of children: N/A   • Years of education: N/A     Occupational History   • Not on file.     Social History Main Topics   • Smoking status: Former Smoker     Packs/day: 0.50     Years: 15.00     Types: Cigarettes, Cigars     Quit date: 1/1/2005   • Smokeless tobacco: Never Used   • Alcohol use No   • Drug use: No   • Sexual activity: Not on file     Other Topics Concern   • Not on file     Social History Narrative   • " "No narrative on file       Family History   Problem Relation Age of Onset   • Cancer Father    • Cancer Maternal Aunt        Allergies:  Codeine; Doxycycline; Monodox; and Vicodin [hydrocodone-acetaminophen]    Review of Systems:  Constitutional: Negative for fever, chills, weight loss, malaise/fatigue and diaphoresis.   HENT: Negative for hearing loss, ear pain, nosebleeds, congestion, sore throat, neck pain, tinnitus and ear discharge.    Eyes: Negative for blurred vision, double vision, photophobia, pain, discharge and redness.   Respiratory: Negative for cough, hemoptysis, sputum production, shortness of breath, wheezing and stridor.    Cardiovascular: Negative for chest pain, palpitations, orthopnea, claudication, leg swelling and PND.   Gastrointestinal: Negative for heartburn, nausea, vomiting, abdominal pain, diarrhea, constipation, blood in stool and melena.   Genitourinary: Negative for dysuria, urgency, frequency, hematuria and flank pain.   Musculoskeletal: Negative for myalgias, back pain, joint pain and falls.   Skin: Negative for itching and rash.  Neurological: Negative for dizziness, tingling, tremors, sensory change, speech change, focal weakness, seizures, loss of consciousness, weakness and headaches.   Endo/Heme/Allergies: Negative for environmental allergies and polydipsia. Does not bruise/bleed easily.   Psychiatric/Behavioral: Negative for depression, suicidal ideas, hallucinations, memory loss and substance abuse. The patient is not nervous/anxious and does not have insomnia.    Physical Exam:  Pulse 72, temperature 37.8 °C (100 °F), resp. rate 18, height 1.575 m (5' 2\"), weight 86.7 kg (191 lb 2.2 oz), last menstrual period 03/08/2001, SpO2 93 %.    Constitutional: she is oriented to person, place, and time.  she appears well-developed and well-nourished. No distress.   Head: Normocephalic and atraumatic.   Neck: Normal range of motion. Neck supple. No JVD present. No tracheal deviation " present. No thyromegaly present.   Cardiovascular: Normal rate, regular rhythm, normal heart sounds and intact distal pulses.  Exam reveals no gallop and no friction rub.  No murmur heard.  Pulmonary/Chest: Effort normal and breath sounds normal. No stridor. No respiratory distress. she has no wheezes. she has no rales.   Abdominal: Soft. Bowel sounds are normal. she exhibits no distension and no mass. There is no tenderness. There is no rebound and no guarding.   Musculoskeletal: Normal range of motion. she exhibits no edema and no tenderness.   Neurological: she is alert and oriented to person, place, and time. she has normal reflexes. No cranial nerve deficit. Coordination normal.   Skin: Skin is warm and dry. No rash noted. she is not diaphoretic. No erythema. No pallor.   Psychiatric: she has a normal mood and affect.  Behavior is normal.       Assessment: This is a 48 y.o. female with an intraductal papilloma.    Plan: Wire localized excision.    Dipika Ochoa M.D.  [facility]  [phone]

## 2018-06-28 ENCOUNTER — OFFICE VISIT (OUTPATIENT)
Dept: BEHAVIORAL HEALTH | Facility: PHYSICIAN GROUP | Age: 48
End: 2018-06-28
Payer: COMMERCIAL

## 2018-06-28 VITALS
RESPIRATION RATE: 14 BRPM | HEIGHT: 62 IN | WEIGHT: 192 LBS | DIASTOLIC BLOOD PRESSURE: 80 MMHG | TEMPERATURE: 97 F | HEART RATE: 74 BPM | BODY MASS INDEX: 35.33 KG/M2 | SYSTOLIC BLOOD PRESSURE: 138 MMHG

## 2018-06-28 DIAGNOSIS — F31.81 BIPOLAR 2 DISORDER (HCC): ICD-10-CM

## 2018-06-28 DIAGNOSIS — F43.10 PTSD (POST-TRAUMATIC STRESS DISORDER): ICD-10-CM

## 2018-06-28 DIAGNOSIS — F41.9 ANXIETY: ICD-10-CM

## 2018-06-28 DIAGNOSIS — F40.01 PANIC DISORDER WITH AGORAPHOBIA: ICD-10-CM

## 2018-06-28 PROCEDURE — 99213 OFFICE O/P EST LOW 20 MIN: CPT | Performed by: PSYCHIATRY & NEUROLOGY

## 2018-06-28 PROCEDURE — 90833 PSYTX W PT W E/M 30 MIN: CPT | Performed by: PSYCHIATRY & NEUROLOGY

## 2018-06-28 RX ORDER — CLONAZEPAM 1 MG/1
TABLET ORAL
Qty: 30 TAB | Refills: 1 | Status: SHIPPED
Start: 2018-06-28 | End: 2018-10-25 | Stop reason: SDUPTHER

## 2018-06-28 ASSESSMENT — PATIENT HEALTH QUESTIONNAIRE - PHQ9
8. MOVING OR SPEAKING SO SLOWLY THAT OTHER PEOPLE COULD HAVE NOTICED. OR THE OPPOSITE, BEING SO FIGETY OR RESTLESS THAT YOU HAVE BEEN MOVING AROUND A LOT MORE THAN USUAL: 0
1. LITTLE INTEREST OR PLEASURE IN DOING THINGS: 1
4. FEELING TIRED OR HAVING LITTLE ENERGY: 1
6. FEELING BAD ABOUT YOURSELF - OR THAT YOU ARE A FAILURE OR HAVE LET YOURSELF OR YOUR FAMILY DOWN: 1
SUM OF ALL RESPONSES TO PHQ QUESTIONS 1-9: 6
2. FEELING DOWN, DEPRESSED, IRRITABLE, OR HOPELESS: 1
7. TROUBLE CONCENTRATING ON THINGS, SUCH AS READING THE NEWSPAPER OR WATCHING TELEVISION: 0
3. TROUBLE FALLING OR STAYING ASLEEP OR SLEEPING TOO MUCH: 1
5. POOR APPETITE OR OVEREATING: 1
SUM OF ALL RESPONSES TO PHQ9 QUESTIONS 1 AND 2: 2
9. THOUGHTS THAT YOU WOULD BE BETTER OFF DEAD, OR OF HURTING YOURSELF: 0

## 2018-06-29 NOTE — PROGRESS NOTES
"[facility]  PSYCHIATRIC FOLLOW-UP NOTE    Name: Ambreen Higuera  MRN: [id number]  : 1970  Age: 48 y.o.  Date of assessment: 2018  PCP: Daniel Brand M.D.  Persons in attendance: Patient    REASON FOR VISIT/CHIEF COMPLAINT (as stated by Patient):  Ambreen Higuera is a 48 y.o., White female, attending follow-up appointment for   Chief Complaint   Patient presents with   • Sleep Problem     I need refills on my clonazepam. I stopped abilify because I have bruises all over my body.    .      HISTORY OF PRESENT ILLNESS:    This is 49 yo female, unemployed, lives with her boyfriend, seen today for follow up. The patient had breast surgery and they removed a tumor. The patient had bruises all over her body and abilify was discontinued before the surgery. The patient is taking a trip to [place] in few weeks. The patient requeted a rfill on her clonazepam..       PSYCHOSOCIAL CHANGES SINCE PREVIOUS CONTACT:  The patients mood has been stable.    RESPONSE TO TREATMENT:  She restarted abilify, bupropion  mg per day, clonazepam 1 mg one a day.    MEDICATION SIDE EFFECTS:  Denied.    REVIEW OF SYSTEMS:        Constitutional positive - obesity   Eyes negative   Ears/Nose/Mouth/Throat negative   Cardiovascular negative   Respiratory positive - asthma   Gastrointestinal negative   Genitourinary negative   Muscular negative   Integumentary negative   Neurological positive - chronic back pain   Endocrine positive - hyperlipidemia.   Hematologic/Lymphatic positive - hep c.       PSYCHIATRIC EXAMINATION/MENTAL STATUS  /80   Pulse 74   Temp 36.1 °C (97 °F)   Resp 14   Ht 1.575 m (5' 2.01\")   Wt 87.1 kg (192 lb)   LMP 2001   BMI 35.11 kg/m²   Participation: Active verbal participation, Attentive and Engaged  Grooming:Neat  Orientation: Alert and Fully Oriented  Eye contact: Good  Behavior:Calm   Mood: Anxious  Affect: Flexible and Full range  Thought process: Logical and " Goal-directed  Thought content:  Within normal limits  Speech: Rate within normal limits and Volume within normal limits  Perception:  Within normal limits  Memory:  No gross evidence of memory deficits  Insight: Good  Judgment: Good  Family/couple interaction observations:   Other:    Current risk:    Suicide: Low   Homicide: Low.   Self-harm: Low  Relapse: Low  Other:   Crisis Safety Plan reviewed?Yes  If evidence of imminent risk is present, intervention/plan:    Medical Records/Labs/Diagnostic Tests Reviewed: yes.    Medical Records/Labs/Diagnostic Tests Ordered: none.    DIAGNOSTIC IMPRESSION(S):  1. Anxiety    2. Panic disorder with agoraphobia    3. Bipolar 2 disorder (HCC)    4. PTSD (post-traumatic stress disorder)           ASSESSMENT AND PLAN:    1. Panic disorder  Clonazepam 1 mg one a day # 30 refill one.    2. Bipolar 2 disorder.  restart abilify 10 mg per day.    3. PTSD.  buropion  mg one a day.    4. Follow up in three months.  After the trip.    16 minutes were spent in psychotherapy.  (If greater than 16 minutes, add [id number] code)   Topics addressed in psychotherapy include:  We discussed her unresolved emotional issues and helped her with emotional skills.  Educated her social skills and self control.  Cognitive restructuring and behavioral changes.  Darius Santos M.D.

## 2018-07-09 ENCOUNTER — OFFICE VISIT (OUTPATIENT)
Dept: MEDICAL GROUP | Facility: MEDICAL CENTER | Age: 48
End: 2018-07-09
Attending: FAMILY MEDICINE
Payer: MEDICAID

## 2018-07-09 VITALS
HEART RATE: 72 BPM | DIASTOLIC BLOOD PRESSURE: 70 MMHG | OXYGEN SATURATION: 97 % | TEMPERATURE: 97.2 F | HEIGHT: 62 IN | RESPIRATION RATE: 14 BRPM | WEIGHT: 191 LBS | SYSTOLIC BLOOD PRESSURE: 120 MMHG | BODY MASS INDEX: 35.15 KG/M2

## 2018-07-09 DIAGNOSIS — J45.40 MODERATE PERSISTENT ASTHMA WITHOUT COMPLICATION: ICD-10-CM

## 2018-07-09 DIAGNOSIS — J34.89 NASAL LESION: ICD-10-CM

## 2018-07-09 DIAGNOSIS — K58.9 IRRITABLE BOWEL SYNDROME, UNSPECIFIED TYPE: ICD-10-CM

## 2018-07-09 DIAGNOSIS — G89.4 CHRONIC PAIN SYNDROME: ICD-10-CM

## 2018-07-09 PROCEDURE — 99212 OFFICE O/P EST SF 10 MIN: CPT | Performed by: FAMILY MEDICINE

## 2018-07-09 PROCEDURE — 99214 OFFICE O/P EST MOD 30 MIN: CPT | Performed by: FAMILY MEDICINE

## 2018-07-09 RX ORDER — DIPHENOXYLATE HYDROCHLORIDE AND ATROPINE SULFATE 2.5; .025 MG/1; MG/1
1 TABLET ORAL 4 TIMES DAILY PRN
Qty: 30 TAB | Refills: 0 | Status: SHIPPED | OUTPATIENT
Start: 2018-07-09 | End: 2018-07-24

## 2018-07-09 RX ORDER — TIOTROPIUM BROMIDE 18 UG/1
18 CAPSULE ORAL; RESPIRATORY (INHALATION) DAILY
Qty: 30 CAP | Refills: 3 | Status: SHIPPED | OUTPATIENT
Start: 2018-07-09 | End: 2018-09-10

## 2018-07-09 ASSESSMENT — ENCOUNTER SYMPTOMS
CHILLS: 0
VOMITING: 0
PALPITATIONS: 0
FEVER: 0
NAUSEA: 0
COUGH: 0
SHORTNESS OF BREATH: 0
ABDOMINAL PAIN: 0
BACK PAIN: 1
NECK PAIN: 0
TINGLING: 1
SPUTUM PRODUCTION: 0

## 2018-07-09 NOTE — PROGRESS NOTES
Subjective:      Ambreen Higuera is a 48 y.o. female who presents with Referral Needed (pain management) and GI Problem            Patient 48-year-old female here for follow-up of her chronic pain, irritable bowel syndrome, moderate persistent asthma with possible underlying COPD, nasal lesion.    She has been seeing a pain management specialist and has been receiving epidural management. She is concerned about the amount of steroids that she has been placed on suction is requesting a referral to another pain management specialist for second opinion. Referral has been sent in and she'll continue to see her current pain management specialist until she is established with her new pain specialist. We'll continue to follow.    Recently she has been having increased frequency of breakthrough episodes of her asthma. She has been needing to use her rescue inhaler more often during the week and she had been. She has had no recent hospitalizations, and no recent fevers. She has been using both Singulair and Allegra as maintenance medications along with her rescue inhaler. Will add Spiriva on a daily basis, and order a pulmonary function test as well. She stopped smoking at the age of 32, but does occasionally smoke marijuana. We'll continue to follow.    She has been having loose stools recently for now identifiable reason. She has not had any recent Trips she has not changed medications or eaten any new foods. She has not had any blood in her stools or mucus in her stools. Stool studies will be ordered as well as having patient try Lomotil as needed. She will also contact her gastroenterologist, she does have a history of irritable bowel syndrome. We'll continue to follow.    Also has a lesion on the medial side of her left nostril. She states that the lesion does hurt occasionally in sometimes bleeds when irritated. Referral to ENT will be made for further evaluation and possible management of the lesion in her left  "nostril. We'll continue to follow.     Current medications, allergies, and problem list reviewed with patient and updated in EPIC.          Review of Systems   Constitutional: Negative for chills and fever.   HENT: Negative for hearing loss and tinnitus.    Respiratory: Negative for cough, sputum production and shortness of breath.    Cardiovascular: Negative for chest pain and palpitations.   Gastrointestinal: Negative for abdominal pain, nausea and vomiting.   Musculoskeletal: Positive for back pain. Negative for joint pain and neck pain.   Skin: Negative for rash.   Neurological: Positive for tingling.          Objective:     /70   Pulse 72   Temp 36.2 °C (97.2 °F)   Resp 14   Ht 1.575 m (5' 2\")   Wt 86.6 kg (191 lb)   LMP 03/08/2001   SpO2 97%   BMI 34.93 kg/m²      Physical Exam   Constitutional: She is oriented to person, place, and time.   BMI 34   HENT:   Head: Normocephalic and atraumatic.   Mouth/Throat: Oropharynx is clear and moist.   Raised skin colored lesion on medial side of L nostril   Cardiovascular: Normal rate, regular rhythm and normal heart sounds.  Exam reveals no friction rub.    No murmur heard.  Pulmonary/Chest: Effort normal and breath sounds normal. No respiratory distress. She has no wheezes. She has no rales.   Abdominal: Soft. Bowel sounds are normal. She exhibits no distension. There is no tenderness.   Neurological: She is alert and oriented to person, place, and time.   Skin: Skin is warm and dry.   Psychiatric: She has a normal mood and affect. Her behavior is normal.   Nursing note and vitals reviewed.              Assessment/Plan:     1. Chronic pain syndrome  Will have her continue to follow up with pain management will continue to follow. A referral was made today for 2nd opinion.  - REFERRAL TO PAIN CLINIC    2. Irritable bowel syndrome, unspecified type  Will have her try lomotil, will order stool studies and have her contact GI to schedule follow up appt.  - " REFERRAL TO GASTROENTEROLOGY  - diphenoxylate-atropine (LOMOTIL) 2.5-0.025 MG Tab; Take 1 Tab by mouth 4 times a day as needed for Diarrhea for up to 15 days.  Dispense: 30 Tab; Refill: 0    3. Moderate persistent asthma without complication  Will have her try spiriva along with her other inhalers. She will also get a PFT and will continue to follow.  - tiotropium (SPIRIVA) 18 MCG Cap; Inhale 1 Cap by mouth every day.  Dispense: 30 Cap; Refill: 3  - PULMONARY FUNCTION TESTS Test requested: Complete Pulmonary Function Test    4. Nasal lesion  Will have her see ENT for a further evaluation of her nasal lesion. Will continue to follow.   - REFERRAL TO ENT

## 2018-07-16 DIAGNOSIS — J45.40 MODERATE PERSISTENT ASTHMA WITHOUT COMPLICATION: ICD-10-CM

## 2018-07-16 RX ORDER — MONTELUKAST SODIUM 10 MG/1
TABLET ORAL
Qty: 90 TAB | Refills: 0 | Status: SHIPPED | OUTPATIENT
Start: 2018-07-16 | End: 2018-07-16 | Stop reason: SDUPTHER

## 2018-07-16 RX ORDER — MONTELUKAST SODIUM 10 MG/1
TABLET ORAL
Qty: 90 TAB | Refills: 1 | Status: SHIPPED | OUTPATIENT
Start: 2018-07-16 | End: 2018-09-10

## 2018-07-17 ENCOUNTER — TELEPHONE (OUTPATIENT)
Dept: MEDICAL GROUP | Facility: PHYSICIAN GROUP | Age: 48
End: 2018-07-17

## 2018-07-17 ENCOUNTER — HOSPITAL ENCOUNTER (OUTPATIENT)
Facility: MEDICAL CENTER | Age: 48
End: 2018-07-17
Attending: FAMILY MEDICINE
Payer: MEDICAID

## 2018-07-17 DIAGNOSIS — R19.7 DIARRHEA, UNSPECIFIED TYPE: ICD-10-CM

## 2018-07-17 LAB
G LAMBLIA+C PARVUM AG STL QL RAPID: NORMAL
SIGNIFICANT IND 70042: NORMAL
SITE SITE: NORMAL
SOURCE SOURCE: NORMAL

## 2018-07-17 PROCEDURE — 87328 CRYPTOSPORIDIUM AG IA: CPT

## 2018-07-17 PROCEDURE — 87899 AGENT NOS ASSAY W/OPTIC: CPT

## 2018-07-17 PROCEDURE — 87329 GIARDIA AG IA: CPT

## 2018-07-17 PROCEDURE — 87045 FECES CULTURE AEROBIC BACT: CPT

## 2018-07-17 PROCEDURE — 87046 STOOL CULTR AEROBIC BACT EA: CPT

## 2018-07-17 NOTE — TELEPHONE ENCOUNTER
Pt requesting orders to be placed for lab [facility] samples have been dropped off as of this morning needs to be placed ASAP to be ran. Orders pended please sign

## 2018-07-17 NOTE — TELEPHONE ENCOUNTER
----- Message from Ambreen Higuera sent at 7/17/2018  8:49 AM PDT -----  Regarding: Non-Urgent Medical Question  Contact: [phone]  I need the orders placed for an O&P and stool culture, we spoke of this at the last visit , but it may have been overlooked because when I dropped off my specimen this morning the orders were not in Epic.  let me know if you have any questions, otherwise just place the requested orders, thanks.

## 2018-07-18 LAB
E COLI SXT1+2 STL IA: NORMAL
SIGNIFICANT IND 70042: NORMAL
SITE SITE: NORMAL
SOURCE SOURCE: NORMAL

## 2018-07-19 ENCOUNTER — HOSPITAL ENCOUNTER (OUTPATIENT)
Dept: PULMONOLOGY | Facility: MEDICAL CENTER | Age: 48
End: 2018-07-19
Payer: MEDICAID

## 2018-07-20 LAB
BACTERIA STL CULT: NORMAL
E COLI SXT1+2 STL IA: NORMAL
SIGNIFICANT IND 70042: NORMAL
SITE SITE: NORMAL
SOURCE SOURCE: NORMAL

## 2018-08-08 ENCOUNTER — HOSPITAL ENCOUNTER (OUTPATIENT)
Dept: RADIOLOGY | Facility: MEDICAL CENTER | Age: 48
End: 2018-08-08
Attending: SURGERY
Payer: MEDICAID

## 2018-08-08 DIAGNOSIS — D24.2 INTRADUCTAL PAPILLOMA OF BREAST, LEFT: ICD-10-CM

## 2018-08-08 DIAGNOSIS — N60.92 ATYPICAL DUCTAL HYPERPLASIA OF LEFT BREAST: ICD-10-CM

## 2018-08-08 PROCEDURE — G0279 TOMOSYNTHESIS, MAMMO: HCPCS | Mod: LT

## 2018-08-10 ENCOUNTER — APPOINTMENT (OUTPATIENT)
Dept: RADIOLOGY | Facility: MEDICAL CENTER | Age: 48
End: 2018-08-10
Attending: INTERNAL MEDICINE
Payer: MEDICAID

## 2018-08-13 ENCOUNTER — OFFICE VISIT (OUTPATIENT)
Dept: MEDICAL GROUP | Facility: MEDICAL CENTER | Age: 48
End: 2018-08-13
Attending: FAMILY MEDICINE
Payer: MEDICAID

## 2018-08-13 VITALS
HEART RATE: 88 BPM | BODY MASS INDEX: 34.41 KG/M2 | HEIGHT: 62 IN | RESPIRATION RATE: 16 BRPM | TEMPERATURE: 97.9 F | OXYGEN SATURATION: 96 % | SYSTOLIC BLOOD PRESSURE: 160 MMHG | WEIGHT: 187 LBS | DIASTOLIC BLOOD PRESSURE: 80 MMHG

## 2018-08-13 DIAGNOSIS — B02.9 HERPES ZOSTER WITHOUT COMPLICATION: ICD-10-CM

## 2018-08-13 PROCEDURE — 99213 OFFICE O/P EST LOW 20 MIN: CPT | Performed by: FAMILY MEDICINE

## 2018-08-13 PROCEDURE — 99214 OFFICE O/P EST MOD 30 MIN: CPT | Performed by: FAMILY MEDICINE

## 2018-08-13 RX ORDER — VALACYCLOVIR HYDROCHLORIDE 500 MG/1
500 TABLET, FILM COATED ORAL 2 TIMES DAILY
Qty: 40 TAB | Refills: 0 | Status: SHIPPED | OUTPATIENT
Start: 2018-08-13 | End: 2018-09-10

## 2018-08-13 RX ORDER — HYDROXYZINE HYDROCHLORIDE 25 MG/1
25 TABLET, FILM COATED ORAL 3 TIMES DAILY PRN
Qty: 30 TAB | Refills: 0 | Status: SHIPPED | OUTPATIENT
Start: 2018-08-13 | End: 2019-06-26

## 2018-08-13 RX ORDER — TRAMADOL HYDROCHLORIDE 50 MG/1
50 TABLET ORAL EVERY 8 HOURS PRN
Qty: 40 TAB | Refills: 0 | Status: SHIPPED | OUTPATIENT
Start: 2018-08-13 | End: 2018-08-27

## 2018-08-13 RX ORDER — FAMCICLOVIR 500 MG/1
500 TABLET ORAL 3 TIMES DAILY
Qty: 21 TAB | Refills: 0 | Status: SHIPPED | OUTPATIENT
Start: 2018-08-13 | End: 2018-08-13

## 2018-08-13 RX ORDER — GABAPENTIN 300 MG/1
300 CAPSULE ORAL 3 TIMES DAILY
Qty: 90 CAP | Refills: 3 | Status: SHIPPED | OUTPATIENT
Start: 2018-08-13 | End: 2018-12-03 | Stop reason: SDUPTHER

## 2018-08-13 ASSESSMENT — ENCOUNTER SYMPTOMS
ABDOMINAL PAIN: 0
EYE PAIN: 0
SPUTUM PRODUCTION: 0
PALPITATIONS: 0
ANOREXIA: 0
FATIGUE: 0
RHINORRHEA: 0
SORE THROAT: 0
NAIL CHANGES: 0
COUGH: 0
CHILLS: 0
VOMITING: 0
SHORTNESS OF BREATH: 0
FEVER: 0
TINGLING: 1
BACK PAIN: 1
DIARRHEA: 0
NAUSEA: 0

## 2018-08-14 NOTE — PROGRESS NOTES
"Subjective:      Ambreen Higuera is a 48 y.o. female who presents with Herpes Zoster (\"on rump\")            Rash   This is a recurrent problem. The current episode started 1 to 4 weeks ago. The problem has been gradually worsening since onset. The affected locations include the right buttock. The rash is characterized by burning, itchiness, pain, redness and swelling. She was exposed to nothing. Associated symptoms include joint pain. Pertinent negatives include no anorexia, congestion, cough, diarrhea, eye pain, facial edema, fatigue, fever, nail changes, rhinorrhea, shortness of breath, sore throat or vomiting. Treatments tried: will start valtrex, neurontin and atarax as directed, will have her hold onto tramadol for breakthrough pain.       Review of Systems   Constitutional: Negative for chills, fatigue and fever.   HENT: Negative for congestion, hearing loss, rhinorrhea, sore throat and tinnitus.    Eyes: Negative for pain.   Respiratory: Negative for cough, sputum production and shortness of breath.    Cardiovascular: Negative for chest pain and palpitations.   Gastrointestinal: Negative for abdominal pain, anorexia, diarrhea, nausea and vomiting.   Musculoskeletal: Positive for back pain and joint pain.   Skin: Positive for itching and rash. Negative for nail changes.   Neurological: Positive for tingling.          Objective:     /80   Pulse 88   Temp 36.6 °C (97.9 °F)   Resp 16   Ht 1.575 m (5' 2\")   Wt 84.8 kg (187 lb)   LMP 03/08/2001   SpO2 96%   BMI 34.20 kg/m²      Physical Exam   Constitutional: She is oriented to person, place, and time.   BMI 34   HENT:   Head: Normocephalic and atraumatic.   Cardiovascular: Normal rate, regular rhythm and normal heart sounds.  Exam reveals no friction rub.    No murmur heard.  Pulmonary/Chest: Effort normal and breath sounds normal. No respiratory distress. She has no wheezes. She has no rales.   Abdominal: Soft. Bowel sounds are normal. She " exhibits no distension. There is no tenderness.   Neurological: She is alert and oriented to person, place, and time.   Skin: Skin is warm and dry. Rash noted. There is erythema.   Erythematous vesicular rash on R buttocks   Psychiatric: She has a normal mood and affect. Her behavior is normal.   Nursing note and vitals reviewed.              Assessment/Plan:     1. Herpes zoster without complication   Will have her start valtrex, neurontin, atarax, and tramadol for breakthrough pain. Will continue to follow.   - gabapentin (NEURONTIN) 300 MG Cap; Take 1 Cap by mouth 3 times a day.  Dispense: 90 Cap; Refill: 3  - tramadol (ULTRAM) 50 MG Tab; Take 1 Tab by mouth every 8 hours as needed for up to 14 days.  Dispense: 40 Tab; Refill: 0  - CONSENT FOR OPIATE PRESCRIPTION

## 2018-08-15 DIAGNOSIS — J33.9 SINUSITIS WITH NASAL POLYPS: ICD-10-CM

## 2018-08-15 DIAGNOSIS — J32.9 SINUSITIS WITH NASAL POLYPS: ICD-10-CM

## 2018-08-19 ENCOUNTER — HOSPITAL ENCOUNTER (OUTPATIENT)
Dept: RADIOLOGY | Facility: MEDICAL CENTER | Age: 48
End: 2018-08-19
Attending: INTERNAL MEDICINE
Payer: MEDICAID

## 2018-08-19 DIAGNOSIS — R19.7 DIARRHEA, UNSPECIFIED TYPE: ICD-10-CM

## 2018-08-19 DIAGNOSIS — R10.30 LOWER ABDOMINAL PAIN: ICD-10-CM

## 2018-08-19 PROCEDURE — 700117 HCHG RX CONTRAST REV CODE 255: Performed by: INTERNAL MEDICINE

## 2018-08-19 PROCEDURE — 74177 CT ABD & PELVIS W/CONTRAST: CPT

## 2018-08-19 RX ADMIN — IOHEXOL 50 ML: 240 INJECTION, SOLUTION INTRATHECAL; INTRAVASCULAR; INTRAVENOUS; ORAL at 07:55

## 2018-08-19 RX ADMIN — IOHEXOL 100 ML: 350 INJECTION, SOLUTION INTRAVENOUS at 09:12

## 2018-08-20 ENCOUNTER — TELEPHONE (OUTPATIENT)
Dept: MEDICAL GROUP | Facility: MEDICAL CENTER | Age: 48
End: 2018-08-20

## 2018-08-20 RX ORDER — CEPHALEXIN 500 MG/1
500 CAPSULE ORAL 4 TIMES DAILY
Qty: 20 CAP | Refills: 0 | Status: SHIPPED | OUTPATIENT
Start: 2018-08-20 | End: 2018-08-27

## 2018-08-27 ENCOUNTER — OFFICE VISIT (OUTPATIENT)
Dept: MEDICAL GROUP | Facility: MEDICAL CENTER | Age: 48
End: 2018-08-27
Attending: FAMILY MEDICINE
Payer: MEDICAID

## 2018-08-27 VITALS
TEMPERATURE: 98.1 F | HEART RATE: 90 BPM | SYSTOLIC BLOOD PRESSURE: 150 MMHG | BODY MASS INDEX: 36.07 KG/M2 | RESPIRATION RATE: 14 BRPM | DIASTOLIC BLOOD PRESSURE: 90 MMHG | HEIGHT: 62 IN | WEIGHT: 196 LBS | OXYGEN SATURATION: 97 %

## 2018-08-27 DIAGNOSIS — I10 ESSENTIAL HYPERTENSION: ICD-10-CM

## 2018-08-27 DIAGNOSIS — J30.89 SEASONAL ALLERGIC RHINITIS DUE TO OTHER ALLERGIC TRIGGER: ICD-10-CM

## 2018-08-27 DIAGNOSIS — J34.89 INTERNAL NASAL LESION: ICD-10-CM

## 2018-08-27 DIAGNOSIS — F41.8 DEPRESSION WITH ANXIETY: ICD-10-CM

## 2018-08-27 DIAGNOSIS — E78.5 HYPERLIPIDEMIA, UNSPECIFIED HYPERLIPIDEMIA TYPE: ICD-10-CM

## 2018-08-27 PROCEDURE — 99213 OFFICE O/P EST LOW 20 MIN: CPT | Performed by: FAMILY MEDICINE

## 2018-08-27 PROCEDURE — 99214 OFFICE O/P EST MOD 30 MIN: CPT | Performed by: FAMILY MEDICINE

## 2018-08-27 RX ORDER — MOMETASONE FUROATE 50 UG/1
2 SPRAY, METERED NASAL DAILY
Qty: 1 INHALER | Refills: 11 | Status: SHIPPED | OUTPATIENT
Start: 2018-08-27 | End: 2018-09-04 | Stop reason: SDUPTHER

## 2018-08-27 RX ORDER — HYDROCHLOROTHIAZIDE 12.5 MG/1
12.5 CAPSULE, GELATIN COATED ORAL DAILY
Qty: 30 CAP | Refills: 3 | Status: SHIPPED | OUTPATIENT
Start: 2018-08-27 | End: 2018-09-10

## 2018-08-27 ASSESSMENT — ENCOUNTER SYMPTOMS
SINUS PAIN: 1
PALPITATIONS: 0
COUGH: 0
FEVER: 0
SPUTUM PRODUCTION: 0
CHILLS: 0
VOMITING: 0
SHORTNESS OF BREATH: 0
BACK PAIN: 1
NAUSEA: 0
EYES NEGATIVE: 1
TINGLING: 1
ABDOMINAL PAIN: 0

## 2018-08-28 NOTE — PROGRESS NOTES
Subjective:      Ambreen Higuera is a 48 y.o. female who presents with Blood Pressure Problem            Patient 48-year-old female here for follow-up of her blood pressure, nasal fissure with polyps, and allergies.    She recently saw her medical provider was told her blood pressure was slightly elevated. She has been taking her metoprolol as directed at 25 mg twice a day. Since she is also having occasional swelling in both ankles along with her elevated blood pressure we will try hydrochlorothiazide 12.5 mg daily. Will also have her get blood work done to check her electrolyte levels as well as her kidney function. In the process will also order a cholesterol, complete blood count and a vitamin D level. She will also check her blood pressures at home and keep records. We'll continue to follow.    She recently saw her ENT for her intranasal lesions consisting of polyps as well as fissures. During the office procedure, she states the doctor she had been seeing abruptly stopped after she squealed when injected with numbing medication. She states she was then told by the treating physician that she will need to be set up in the hospital for the procedure to be completed. Because she felt uncomfortable during the process she requested referral to another ear nose and throat physician. Referral will be submitted for another ENT will continue to follow.    A refill of her Nasonex will be sent to her pharmacy to be continued as previously directed. We'll continue to follow.     Current medications, allergies, and problem list reviewed with patient and updated in EPIC.          Review of Systems   Constitutional: Negative for chills and fever.   HENT: Positive for congestion and sinus pain. Negative for hearing loss and tinnitus.    Eyes: Negative.    Respiratory: Negative for cough, sputum production and shortness of breath.    Cardiovascular: Negative for chest pain and palpitations.   Gastrointestinal: Negative  "for abdominal pain, nausea and vomiting.   Musculoskeletal: Positive for back pain.   Neurological: Positive for tingling.          Objective:     /90   Pulse 90   Temp 36.7 °C (98.1 °F)   Resp 14   Ht 1.575 m (5' 2\")   Wt 88.9 kg (196 lb)   LMP 03/08/2001   SpO2 97%   BMI 35.85 kg/m²      Physical Exam   Constitutional: She is oriented to person, place, and time.   BMI 35   HENT:   Head: Normocephalic and atraumatic.   Mouth/Throat: Oropharynx is clear and moist.   congestion   Cardiovascular: Normal rate, regular rhythm and normal heart sounds.  Exam reveals no friction rub.    No murmur heard.  Pulmonary/Chest: Effort normal and breath sounds normal. No respiratory distress. She has no wheezes. She has no rales.   Abdominal: Soft. Bowel sounds are normal. She exhibits no distension. There is no tenderness.   Neurological: She is alert and oriented to person, place, and time.   Skin: Skin is warm and dry.   Psychiatric: She has a normal mood and affect. Her behavior is normal.   Nursing note and vitals reviewed.              Assessment/Plan:     1. Essential hypertension  Will have her try hydrochlorothiazide along with metoprolol. We'll also recheck blood work. She has been advised to monitor blood pressure at home and keep notes. If blood pressure elevated or having symptoms of CP, SOB or neurologic changes to go to the er.    - hydrochlorothiazide (MICROZIDE) 12.5 MG capsule; Take 1 Cap by mouth every day.  Dispense: 30 Cap; Refill: 3  - mometasone (NASONEX) 50 MCG/ACT nasal spray; Spray 2 Sprays in nose every day.  Dispense: 1 Inhaler; Refill: 11  - COMP METABOLIC PANEL; Future  - LIPID PROFILE; Future  - CBC WITH DIFFERENTIAL; Future  - VITAMIN D,25 HYDROXY; Future    2. Seasonal allergic rhinitis due to other allergic trigger  Her Nasonex will be sent to her pharmacy for her to continue to use as directed. Will continue to follow.  - hydrochlorothiazide (MICROZIDE) 12.5 MG capsule; Take 1 Cap " by mouth every day.  Dispense: 30 Cap; Refill: 3  - mometasone (NASONEX) 50 MCG/ACT nasal spray; Spray 2 Sprays in nose every day.  Dispense: 1 Inhaler; Refill: 11  - COMP METABOLIC PANEL; Future  - LIPID PROFILE; Future  - CBC WITH DIFFERENTIAL; Future  - VITAMIN D,25 HYDROXY; Future    3. Hyperlipidemia, unspecified hyperlipidemia type  Will have her continue to watch her diet and exercise as tolerated. Will reorder a lipid panel to check her cholesterol levels. We'll continue to follow.  - hydrochlorothiazide (MICROZIDE) 12.5 MG capsule; Take 1 Cap by mouth every day.  Dispense: 30 Cap; Refill: 3  - mometasone (NASONEX) 50 MCG/ACT nasal spray; Spray 2 Sprays in nose every day.  Dispense: 1 Inhaler; Refill: 11  - COMP METABOLIC PANEL; Future  - LIPID PROFILE; Future  - CBC WITH DIFFERENTIAL; Future  - VITAMIN D,25 HYDROXY; Future    4. Depression with anxiety  Will continue to follow up with her mental health providers as directed. Will continue to follow.  - hydrochlorothiazide (MICROZIDE) 12.5 MG capsule; Take 1 Cap by mouth every day.  Dispense: 30 Cap; Refill: 3  - mometasone (NASONEX) 50 MCG/ACT nasal spray; Spray 2 Sprays in nose every day.  Dispense: 1 Inhaler; Refill: 11  - COMP METABOLIC PANEL; Future  - LIPID PROFILE; Future  - CBC WITH DIFFERENTIAL; Future  - VITAMIN D,25 HYDROXY; Future    5. Internal nasal lesion  A referral to ENT will be submitted today. We will continue to follow.  - REFERRAL TO ENT

## 2018-08-31 ENCOUNTER — HOSPITAL ENCOUNTER (OUTPATIENT)
Dept: LAB | Facility: MEDICAL CENTER | Age: 48
End: 2018-08-31
Attending: FAMILY MEDICINE
Payer: MEDICAID

## 2018-08-31 DIAGNOSIS — J45.40 MODERATE PERSISTENT ASTHMA WITHOUT COMPLICATION: ICD-10-CM

## 2018-08-31 DIAGNOSIS — E78.5 HYPERLIPIDEMIA, UNSPECIFIED HYPERLIPIDEMIA TYPE: ICD-10-CM

## 2018-08-31 DIAGNOSIS — I10 ESSENTIAL HYPERTENSION: ICD-10-CM

## 2018-08-31 DIAGNOSIS — J30.89 SEASONAL ALLERGIC RHINITIS DUE TO OTHER ALLERGIC TRIGGER: ICD-10-CM

## 2018-08-31 DIAGNOSIS — F41.8 DEPRESSION WITH ANXIETY: ICD-10-CM

## 2018-08-31 LAB
25(OH)D3 SERPL-MCNC: 36 NG/ML (ref 30–100)
ALBUMIN SERPL BCP-MCNC: 4.7 G/DL (ref 3.2–4.9)
ALBUMIN/GLOB SERPL: 1.6 G/DL
ALP SERPL-CCNC: 99 U/L (ref 30–99)
ALT SERPL-CCNC: 22 U/L (ref 2–50)
ANION GAP SERPL CALC-SCNC: 9 MMOL/L (ref 0–11.9)
AST SERPL-CCNC: 18 U/L (ref 12–45)
BASOPHILS # BLD AUTO: 1.2 % (ref 0–1.8)
BASOPHILS # BLD: 0.1 K/UL (ref 0–0.12)
BILIRUB SERPL-MCNC: 0.5 MG/DL (ref 0.1–1.5)
BUN SERPL-MCNC: 15 MG/DL (ref 8–22)
CALCIUM SERPL-MCNC: 9.8 MG/DL (ref 8.5–10.5)
CHLORIDE SERPL-SCNC: 100 MMOL/L (ref 96–112)
CHOLEST SERPL-MCNC: 193 MG/DL (ref 100–199)
CO2 SERPL-SCNC: 29 MMOL/L (ref 20–33)
CREAT SERPL-MCNC: 0.83 MG/DL (ref 0.5–1.4)
EOSINOPHIL # BLD AUTO: 0.32 K/UL (ref 0–0.51)
EOSINOPHIL NFR BLD: 3.7 % (ref 0–6.9)
ERYTHROCYTE [DISTWIDTH] IN BLOOD BY AUTOMATED COUNT: 46.3 FL (ref 35.9–50)
GLOBULIN SER CALC-MCNC: 2.9 G/DL (ref 1.9–3.5)
GLUCOSE SERPL-MCNC: 127 MG/DL (ref 65–99)
HCT VFR BLD AUTO: 45.2 % (ref 37–47)
HDLC SERPL-MCNC: 47 MG/DL
HGB BLD-MCNC: 15 G/DL (ref 12–16)
IMM GRANULOCYTES # BLD AUTO: 0.04 K/UL (ref 0–0.11)
IMM GRANULOCYTES NFR BLD AUTO: 0.5 % (ref 0–0.9)
LDLC SERPL CALC-MCNC: 119 MG/DL
LYMPHOCYTES # BLD AUTO: 2.22 K/UL (ref 1–4.8)
LYMPHOCYTES NFR BLD: 25.6 % (ref 22–41)
MCH RBC QN AUTO: 30.5 PG (ref 27–33)
MCHC RBC AUTO-ENTMCNC: 33.2 G/DL (ref 33.6–35)
MCV RBC AUTO: 91.9 FL (ref 81.4–97.8)
MONOCYTES # BLD AUTO: 0.59 K/UL (ref 0–0.85)
MONOCYTES NFR BLD AUTO: 6.8 % (ref 0–13.4)
NEUTROPHILS # BLD AUTO: 5.41 K/UL (ref 2–7.15)
NEUTROPHILS NFR BLD: 62.2 % (ref 44–72)
NRBC # BLD AUTO: 0 K/UL
NRBC BLD-RTO: 0 /100 WBC
PLATELET # BLD AUTO: 363 K/UL (ref 164–446)
PMV BLD AUTO: 9.5 FL (ref 9–12.9)
POTASSIUM SERPL-SCNC: 3.6 MMOL/L (ref 3.6–5.5)
PROT SERPL-MCNC: 7.6 G/DL (ref 6–8.2)
RBC # BLD AUTO: 4.92 M/UL (ref 4.2–5.4)
SODIUM SERPL-SCNC: 138 MMOL/L (ref 135–145)
TRIGL SERPL-MCNC: 136 MG/DL (ref 0–149)
WBC # BLD AUTO: 8.7 K/UL (ref 4.8–10.8)

## 2018-08-31 PROCEDURE — 82306 VITAMIN D 25 HYDROXY: CPT

## 2018-08-31 PROCEDURE — 36415 COLL VENOUS BLD VENIPUNCTURE: CPT

## 2018-08-31 PROCEDURE — 85025 COMPLETE CBC W/AUTO DIFF WBC: CPT

## 2018-08-31 PROCEDURE — 80061 LIPID PANEL: CPT

## 2018-08-31 PROCEDURE — 80053 COMPREHEN METABOLIC PANEL: CPT

## 2018-09-04 ENCOUNTER — TELEPHONE (OUTPATIENT)
Dept: MEDICAL GROUP | Facility: MEDICAL CENTER | Age: 48
End: 2018-09-04

## 2018-09-04 DIAGNOSIS — E78.5 HYPERLIPIDEMIA, UNSPECIFIED HYPERLIPIDEMIA TYPE: ICD-10-CM

## 2018-09-04 DIAGNOSIS — I10 ESSENTIAL HYPERTENSION: ICD-10-CM

## 2018-09-04 DIAGNOSIS — J30.89 SEASONAL ALLERGIC RHINITIS DUE TO OTHER ALLERGIC TRIGGER: ICD-10-CM

## 2018-09-04 DIAGNOSIS — F41.8 DEPRESSION WITH ANXIETY: ICD-10-CM

## 2018-09-04 RX ORDER — ALBUTEROL SULFATE 90 MCG
HFA AEROSOL WITH ADAPTER (GRAM) INHALATION
Qty: 7 INHALER | Refills: 3 | Status: SHIPPED | OUTPATIENT
Start: 2018-09-04 | End: 2019-06-26

## 2018-09-04 RX ORDER — MOMETASONE FUROATE 50 UG/1
2 SPRAY, METERED NASAL DAILY
Qty: 1 INHALER | Refills: 11 | Status: SHIPPED | OUTPATIENT
Start: 2018-09-04 | End: 2018-09-06

## 2018-09-05 DIAGNOSIS — I10 ESSENTIAL HYPERTENSION: ICD-10-CM

## 2018-09-06 ENCOUNTER — TELEPHONE (OUTPATIENT)
Dept: MEDICAL GROUP | Facility: MEDICAL CENTER | Age: 48
End: 2018-09-06

## 2018-09-06 RX ORDER — FLUTICASONE PROPIONATE 50 MCG
2 SPRAY, SUSPENSION (ML) NASAL DAILY
Qty: 16 G | Refills: 11 | Status: SHIPPED | OUTPATIENT
Start: 2018-09-06 | End: 2018-09-10

## 2018-09-06 NOTE — TELEPHONE ENCOUNTER
MEDICATION PRIOR AUTHORIZATION NEEDED:    1. Name of Medication: nasonex    2. Requested By (Name of Pharmacy): [facility]     3. Is insurance on file current? yes    4. What is the name & phone number of the 3rd party payor? Medicaid ffs

## 2018-09-06 NOTE — TELEPHONE ENCOUNTER
FINAL PRIOR AUTHORIZATION STATUS:    1.  Name of Medication & Dose: mometasone spr 50 mcg      2. Prior Auth Status: Denied.  Reason: do to formally decision     3. Action Taken: Pharmacy Notified: N\A Patient Notified: N\A

## 2018-09-10 ENCOUNTER — PATIENT MESSAGE (OUTPATIENT)
Dept: MEDICAL GROUP | Facility: MEDICAL CENTER | Age: 48
End: 2018-09-10

## 2018-09-10 ENCOUNTER — APPOINTMENT (OUTPATIENT)
Dept: ADMISSIONS | Facility: MEDICAL CENTER | Age: 48
End: 2018-09-10
Attending: SURGERY
Payer: MEDICAID

## 2018-09-10 DIAGNOSIS — I10 ESSENTIAL HYPERTENSION: ICD-10-CM

## 2018-09-10 RX ORDER — TIOTROPIUM BROMIDE 18 UG/1
18 CAPSULE ORAL; RESPIRATORY (INHALATION) EVERY MORNING
COMMUNITY
End: 2018-11-08

## 2018-09-10 RX ORDER — OXYBUTYNIN CHLORIDE 10 MG/1
10 TABLET, EXTENDED RELEASE ORAL EVERY MORNING
COMMUNITY

## 2018-09-10 RX ORDER — HYDROCHLOROTHIAZIDE 12.5 MG/1
12.5 CAPSULE, GELATIN COATED ORAL EVERY MORNING
COMMUNITY
End: 2018-12-24

## 2018-09-10 RX ORDER — FLUTICASONE PROPIONATE 50 MCG
2 SPRAY, SUSPENSION (ML) NASAL EVERY MORNING
COMMUNITY
End: 2018-09-11 | Stop reason: SDUPTHER

## 2018-09-10 RX ORDER — ESOMEPRAZOLE MAGNESIUM 40 MG/1
40 GRANULE, DELAYED RELEASE ORAL
COMMUNITY

## 2018-09-10 RX ORDER — MONTELUKAST SODIUM 10 MG/1
10 TABLET ORAL EVERY MORNING
COMMUNITY
End: 2019-01-07

## 2018-09-10 RX ORDER — ATORVASTATIN CALCIUM 10 MG/1
10 TABLET, FILM COATED ORAL EVERY MORNING
COMMUNITY
End: 2019-01-22

## 2018-09-11 ENCOUNTER — TELEPHONE (OUTPATIENT)
Dept: MEDICAL GROUP | Facility: MEDICAL CENTER | Age: 48
End: 2018-09-11

## 2018-09-11 RX ORDER — FLUTICASONE PROPIONATE 50 MCG
2 SPRAY, SUSPENSION (ML) NASAL EVERY MORNING
Qty: 16 G | Refills: 3 | Status: SHIPPED | OUTPATIENT
Start: 2018-09-11 | End: 2019-06-26

## 2018-09-13 NOTE — H&P
DATE:  09/14/2018    ADMITTING DIAGNOSIS:  Papilloma, left nasal vestibule.    CHIEF COMPLAINT:  Lesion, left nose.    HISTORY OF PRESENT ILLNESS:  This 48-year-old female seen in our office in   07/2018.  She had a small papilloma on the floor of the left nare.  We   attempted to remove this under local in the office, but she could not tolerate   any manipulation.    PAST MEDICAL HISTORY:  Patient has history of asthma.  She also has breast   disease for which Dr. Ochoa is evaluating and treating surgically.  She is   being admitted on the 09/14 for breast surgery.  In tandem, we will attempt to   do the left nasal vestibular papilloma.    PAST MEDICAL HISTORY OTHERWISE REVEALS SHE IS ALLERGIC TO DOXYCYCLINE AND   VICODIN.    PHYSICAL EXAMINATION:  GENERAL:  Revealed a 1 cm papilloma on the floor of the left nasal vestibule.    It appeared benign.  HEAD AND NECK:  Otherwise negative.    DIAGNOSIS:  Left nasal vestibular papilloma.    RECOMMENDATIONS:  Either prior to Dr. Ochoa, surgery or following Dr. Ochoa,   surgery, we will proceed with an elliptical excision of the floor of the left   nasal vestibule lesion with primary closure.  Patient understood and agreed.       ____________________________________     MD LUZMARIA ALMARAZ / JANIYA    DD:  09/13/2018 10:31:50  DT:  09/13/2018 11:06:58    D#:  [id number]  Job#:  [id number]    cc: KIMBERLI SAMUEL MD, Dipika Ochoa MD

## 2018-09-14 ENCOUNTER — APPOINTMENT (OUTPATIENT)
Dept: RADIOLOGY | Facility: MEDICAL CENTER | Age: 48
End: 2018-09-14
Attending: SURGERY
Payer: MEDICAID

## 2018-09-14 ENCOUNTER — HOSPITAL ENCOUNTER (OUTPATIENT)
Facility: MEDICAL CENTER | Age: 48
End: 2018-09-14
Attending: SURGERY | Admitting: SURGERY
Payer: MEDICAID

## 2018-09-14 VITALS
HEART RATE: 81 BPM | TEMPERATURE: 97.6 F | BODY MASS INDEX: 34.69 KG/M2 | RESPIRATION RATE: 16 BRPM | SYSTOLIC BLOOD PRESSURE: 130 MMHG | HEIGHT: 62 IN | OXYGEN SATURATION: 95 % | WEIGHT: 188.49 LBS | DIASTOLIC BLOOD PRESSURE: 74 MMHG

## 2018-09-14 DIAGNOSIS — G89.18 POST-OPERATIVE PAIN: ICD-10-CM

## 2018-09-14 DIAGNOSIS — N60.92 BENIGN MAMMARY DYSPLASIA OF LEFT BREAST: ICD-10-CM

## 2018-09-14 PROCEDURE — 700102 HCHG RX REV CODE 250 W/ 637 OVERRIDE(OP)

## 2018-09-14 PROCEDURE — 160029 HCHG SURGERY MINUTES - 1ST 30 MINS LEVEL 4: Performed by: SURGERY

## 2018-09-14 PROCEDURE — 88305 TISSUE EXAM BY PATHOLOGIST: CPT

## 2018-09-14 PROCEDURE — 501445 HCHG STAPLER, SKIN DISP: Performed by: SURGERY

## 2018-09-14 PROCEDURE — 76098 X-RAY EXAM SURGICAL SPECIMEN: CPT | Mod: LT

## 2018-09-14 PROCEDURE — 160041 HCHG SURGERY MINUTES - EA ADDL 1 MIN LEVEL 4: Performed by: SURGERY

## 2018-09-14 PROCEDURE — 19281 PERQ DEVICE BREAST 1ST IMAG: CPT | Mod: LT

## 2018-09-14 PROCEDURE — 700111 HCHG RX REV CODE 636 W/ 250 OVERRIDE (IP)

## 2018-09-14 PROCEDURE — 500433 HCHG DRESSING, KLING 4': Performed by: SURGERY

## 2018-09-14 PROCEDURE — A6402 STERILE GAUZE <= 16 SQ IN: HCPCS | Performed by: SURGERY

## 2018-09-14 PROCEDURE — 160035 HCHG PACU - 1ST 60 MINS PHASE I: Performed by: SURGERY

## 2018-09-14 PROCEDURE — 160009 HCHG ANES TIME/MIN: Performed by: SURGERY

## 2018-09-14 PROCEDURE — 700101 HCHG RX REV CODE 250

## 2018-09-14 PROCEDURE — 160048 HCHG OR STATISTICAL LEVEL 1-5: Performed by: SURGERY

## 2018-09-14 PROCEDURE — 160002 HCHG RECOVERY MINUTES (STAT): Performed by: SURGERY

## 2018-09-14 PROCEDURE — 501838 HCHG SUTURE GENERAL: Performed by: SURGERY

## 2018-09-14 PROCEDURE — A9270 NON-COVERED ITEM OR SERVICE: HCPCS

## 2018-09-14 PROCEDURE — 500423 HCHG DRESSING, ABD COMBINE: Performed by: SURGERY

## 2018-09-14 PROCEDURE — 88307 TISSUE EXAM BY PATHOLOGIST: CPT

## 2018-09-14 RX ORDER — OXYMETAZOLINE HYDROCHLORIDE 0.05 G/100ML
SPRAY NASAL
Status: DISCONTINUED
Start: 2018-09-14 | End: 2018-09-14 | Stop reason: HOSPADM

## 2018-09-14 RX ORDER — LIDOCAINE AND PRILOCAINE 25; 25 MG/G; MG/G
CREAM TOPICAL
Status: COMPLETED
Start: 2018-09-14 | End: 2018-09-14

## 2018-09-14 RX ORDER — MIDAZOLAM HYDROCHLORIDE 1 MG/ML
INJECTION INTRAMUSCULAR; INTRAVENOUS
Status: DISCONTINUED
Start: 2018-09-14 | End: 2018-09-14 | Stop reason: HOSPADM

## 2018-09-14 RX ORDER — SODIUM CHLORIDE, SODIUM LACTATE, POTASSIUM CHLORIDE, CALCIUM CHLORIDE 600; 310; 30; 20 MG/100ML; MG/100ML; MG/100ML; MG/100ML
INJECTION, SOLUTION INTRAVENOUS CONTINUOUS
Status: DISCONTINUED | OUTPATIENT
Start: 2018-09-14 | End: 2018-09-14 | Stop reason: HOSPADM

## 2018-09-14 RX ORDER — BUPIVACAINE HYDROCHLORIDE AND EPINEPHRINE 5; 5 MG/ML; UG/ML
INJECTION, SOLUTION EPIDURAL; INTRACAUDAL; PERINEURAL
Status: DISCONTINUED | OUTPATIENT
Start: 2018-09-14 | End: 2018-09-14 | Stop reason: HOSPADM

## 2018-09-14 RX ORDER — ALPRAZOLAM 0.25 MG/1
TABLET ORAL
Status: COMPLETED
Start: 2018-09-14 | End: 2018-09-14

## 2018-09-14 RX ORDER — TRAMADOL HYDROCHLORIDE 50 MG/1
50 TABLET ORAL EVERY 4 HOURS PRN
Qty: 30 TAB | Refills: 0 | Status: SHIPPED | OUTPATIENT
Start: 2018-09-14 | End: 2018-09-21

## 2018-09-14 RX ADMIN — ALPRAZOLAM: 0.25 TABLET ORAL at 07:15

## 2018-09-14 RX ADMIN — SODIUM CHLORIDE, SODIUM LACTATE, POTASSIUM CHLORIDE, CALCIUM CHLORIDE: 600; 310; 30; 20 INJECTION, SOLUTION INTRAVENOUS at 07:00

## 2018-09-14 RX ADMIN — LIDOCAINE AND PRILOCAINE 1 EACH: 25; 25 CREAM TOPICAL at 07:15

## 2018-09-14 ASSESSMENT — PAIN SCALES - GENERAL
PAINLEVEL_OUTOF10: 0

## 2018-09-14 NOTE — PROGRESS NOTES
Discharge Instructions, Lumpectomy:    Care of Your Incisions:  • Leave the bandages on for 48 hours. You can shower with the dressing on as it is waterproof.  Avoid getting lotions, powders or deodorant on the incision while it is healing.  • There is no need to re-bandage the incisions after the first 48 hours, but it may be more comfortable to tuck a gauze pad inside your bra for the first week. You can buy 4 x 4 gauze dressings at your pharmacy.   • You may have thin paper strips across the incision. These are called Steri-Strips. When they start to curl at the edges, you can peel them off. It is okay to shower with these on.  • Don’t worry if the area under either incision feels firm or hard. This is normal and usually softens within a few months.    How to Manage Discomfort After Surgery:  • It is normal to have tenderness, discomfort or mild swelling at the site of the incisions.     You may also feel:  - Numbness at the incisions.  - Occasional shooting pains in the breast as you heal.    • You will be given a prescription for pain medication prior to being discharged from the hospital. If you are having pain, take the medication as directed by your physician and by the label directions. You should be comfortable and moving around. Most women use some prescription pain medication, though some need only over the counter pain medication, such as ibuprofen (Motrin) or acetaminophen (Tylenol). Some women have little pain and take nothing at all. Whatever amount of pain you have, it is important to listen to your body and use the medication if needed during your recovery.    • Prescription pain medication may cause constipation. If you are having problems, use what you normally would or call your nurse for suggestions. It also helps to stay regular by including fiber in your diet (for example: bran or fruits and vegetables) and drink plenty of liquids (water, juice, etc.).    Activity:  • You may resume your  normal routine, but avoid heavy lifting (over 10 pounds), pushing or pulling until your first post-operative visit, unless otherwise specified by your surgeon.  • Do not drive for at least 24-48 hours after surgery (unless otherwise directed by your surgeon), or while you are taking prescription pain medicine. Prescription pain medicine causes drowsiness (makes you sleepy) so you should avoid doing any tasks where you should be awake and alert (driving, operating machinery, etc).    When to Call Your Doctor/Nurse:  • If you have a fever greater than 100.5, increased pain, redness or warmth at the area around the incision, drainage from the incision or around the drain, or swelling of the arm. Call Dr. Ochoa's office at [phone] with any other questions or concerns.    You should have an appointment to see Dr. Ochoa about a week after surgery, call [phone] if you do not already have an appointment.    Office address:  [address] [place] [address] [id number]  [place] [id number]      Dipika Ochoa M.D.  [facility]  [phone]

## 2018-09-14 NOTE — DISCHARGE INSTRUCTIONS
ACTIVITY: Rest and take it easy for the first 24 hours.  A responsible adult is recommended to remain with you during that time.  It is normal to feel sleepy.  We encourage you to not do anything that requires balance, judgment or coordination.    MILD FLU-LIKE SYMPTOMS ARE NORMAL. YOU MAY EXPERIENCE GENERALIZED MUSCLE ACHES, THROAT IRRITATION, HEADACHE AND/OR SOME NAUSEA.    FOR 24 HOURS DO NOT:  Drive, operate machinery or run household appliances.  Drink beer or alcoholic beverages.   Make important decisions or sign legal documents.    SPECIAL INSTRUCTIONS: PER MD    DIET: To avoid nausea, slowly advance diet as tolerated, avoiding spicy or greasy foods for the first day.  Add more substantial food to your diet according to your physician's instructions.  Babies can be fed formula or breast milk as soon as they are hungry.  INCREASE FLUIDS AND FIBER TO AVOID CONSTIPATION.    SURGICAL DRESSING/BATHING: PER MD    FOLLOW-UP APPOINTMENT:  A follow-up appointment should be arranged with your doctor in PER MD; call to schedule.    You should CALL YOUR PHYSICIAN if you develop:  Fever greater than 101 degrees F.  Pain not relieved by medication, or persistent nausea or vomiting.  Excessive bleeding (blood soaking through dressing) or unexpected drainage from the wound.  Extreme redness or swelling around the incision site, drainage of pus or foul smelling drainage.  Inability to urinate or empty your bladder within 8 hours.  Problems with breathing or chest pain.    You should call 911 if you develop problems with breathing or chest pain.  If you are unable to contact your doctor or surgical center, you should go to the nearest emergency room or urgent care center.  Physician's telephone #: [phone]    If any questions arise, call your doctor.  If your doctor is not available, please feel free to call the Surgical Center at [phone].  The Center is open Monday through Friday from 7AM to 7PM.  You can also call  the HEALTH HOTLINE open 24 hours/day, 7 days/week and speak to a nurse at [phone], or toll free at [phone].    A registered nurse may call you a few days after your surgery to see how you are doing after your procedure.    MEDICATIONS: Resume taking daily medication.  Take prescribed pain medication with food.  If no medication is prescribed, you may take non-aspirin pain medication if needed.  PAIN MEDICATION CAN BE VERY CONSTIPATING.  Take a stool softener or laxative such as senokot, pericolace, or milk of magnesia if needed.    Prescription given for TRAMADOL.  Last pain medication given at NA.    If your physician has prescribed pain medication that includes Acetaminophen (Tylenol), do not take additional Acetaminophen (Tylenol) while taking the prescribed medication.    Depression / Suicide Risk    As you are discharged from this [facility] facility, it is important to learn how to keep safe from harming yourself.    Recognize the warning signs:  · Abrupt changes in personality, positive or negative- including increase in energy   · Giving away possessions  · Change in eating patterns- significant weight changes-  positive or negative  · Change in sleeping patterns- unable to sleep or sleeping all the time   · Unwillingness or inability to communicate  · Depression  · Unusual sadness, discouragement and loneliness  · Talk of wanting to die  · Neglect of personal appearance   · Rebelliousness- reckless behavior  · Withdrawal from people/activities they love  · Confusion- inability to concentrate     If you or a loved one observes any of these behaviors or has concerns about self-harm, here's what you can do:  · Talk about it- your feelings and reasons for harming yourself  · Remove any means that you might use to hurt yourself (examples: pills, rope, extension cords, firearm)  · Get professional help from the community (Mental Health, Substance Abuse, psychological counseling)  · Do not be  alone:Call your Safe Contact- someone whom you trust who will be there for you.  · Call your local CRISIS HOTLINE 315-6109 or 416-338-8026  · Call your local Children's Mobile Crisis Response Team [place] (006) 191-6312 or www.[employer]  · Call the toll free National Suicide Prevention Hotlines   · National Suicide Prevention Lifeline 402-842-TPIC (0372)  · National [place] Line Network 800-SUICIDE (677-5566)

## 2018-09-14 NOTE — OR SURGEON
Immediate Post OP Note    PreOp Diagnosis: keratoacanthoma, lt nasal vestibule    PostOp Diagnosis: same    Procedure(s):  BREAST BIOPSY- EXCISIONAL  AFTER WIRE LOC - Wound Class: Clean  LESION EXCISION GENERAL - 1 CM NARE PAPILLOMA W/SIMPLE CLOSURE - Wound Class: Dirty or Infected    Surgeon(s):  MONTRELL Chavarria M.D.    Anesthesiologist/Type of Anesthesia:  Anesthesiologist: Talha Lockhart M.D./General    Surgical Staff:  Assistant: SANIA Church  Circulator: Pat Senior R.N.  Scrub Person: Maame Chen    Specimens removed if any:  * No specimens in log *    Estimated Blood Loss: minimal    Findings: lesion, lt nasal vestibule    Complications: none        9/14/2018 11:02 AM Geri Byrne M.D.

## 2018-09-14 NOTE — OP REPORT
Operative Report    Date: 9/14/2018    Surgeon: Dipika Ochoa M.D.    Assistant: MELISSA Farmer    Anesthesiologist: Richy STOCK    Pre-operative Diagnosis: D24.9 Benign neoplasm of unspecified breast (intraductal papilloma)    Post-operative Diagnosis: same     Procedure: left breast needle localized excisional biopsy    Findings: clip within excised needle localized specimen.    Procedure in detail: The patient was identified in the pre-operative holding area and brought to the operating room. Prior to the procedure, she underwent needle localization with radiology due to the non-palpable nature of the lesion.    Correct side and site were identified. Pre-operative antibiotics of ancef were administered prior to the procedure. Anesthesia was smoothly induced. The patient was prepped and draped in the usual sterile fashion.    The skin was infiltrated with local anesthetic and a curvilinear incision was made in the upper outer quadrant. The breast tissue was grasped with Allis clamps and a core of tissue was removed around the localization wire. The specimen was then completely removed, marked for orientation, and was sent to Radiology for mammogram.  The radiologist confirmed the clip to be within the excised specimen. Meticulous hemostasis was achieved with electrocautery. The area was irrigated and suctioned. The skin was closed in two layers with vicryl and monocryl. Sterile dressings were applied.     The patient was awakened from anesthetic, and was taken to the recovery room in stable condition.    Sponge and needle counts were correct at the end of the case.     Specimen:  left needle localized breast biopsy    EBL: minimal    Dispo: stable, extubated, to PACU    Dipika Ochoa M.D.  [facility]  [phone]

## 2018-09-14 NOTE — PROGRESS NOTES
1124 pt adm to PACU from OR via [facility] by RN and Anesthesia. Report received and care assumed. Monitors on - VSS, LMA in -breathing even and unlabored  1145 SO in room with pt  1209 discharge instructions reviewed with pt and family. All questions and concerns addressed.  1223 pt discharged ambulatory  to private car - acc by family

## 2018-09-14 NOTE — OP REPORT
DATE OF SERVICE:  09/14/2018    PREOPERATIVE DIAGNOSIS:  Keratoacanthoma, left nasal vestibule.    POSTOPERATIVE DIAGNOSIS:  Keratoacanthoma, left nasal vestibule.    OPERATION PERFORMED:  Excision of lesion, left nasal vestibule, primary   closure.    ANESTHESIA:  General LMA, associated anesthesia.    LOCAL ADJUNCTS:  0.5% Marcaine, 1:200,000 epinephrine, a total amount 2 mL   injected submucosally, left nasal vestibule preoperatively.    COUNTS:  Sharps and sponge counts correct.     ESTIMATED BLOOD LOSS:  Minimal.    INDICATIONS:  This 48-year-old has had a keratotic lesion involving the left   nasal vestibule.  She did not want to have this done under local.    The patient was having breast procedure today and we planned a tandem   procedure with Dr. Ochoa.    DESCRIPTION OF PROCEDURE:  The patient was taken to the operating room #23.    Anesthesia performed general LMA anesthesia without complication.  The table   was left at 12 o'clock.  The area in question was prepped and draped in the   usual sterile fashion.  It was blocked as noted.  The lesion in question was   excised sharply.  Topical oxymetazoline was placed on the wound for   hemostasis.  A hand held cautery was used for hemostasis as well.  The wound   was advanced slightly with scissor and then closed with interrupted Vicryl   sutures.  The procedure was terminated.  Dr. Ochoa was in attendance and was   ready to perform breast surgery as planned.       ____________________________________     MD LUZMARIA ALMARAZ / JANIYA    DD:  09/14/2018 11:09:32  DT:  09/14/2018 11:59:45    D#:  [id number]  Job#:  [id number]    cc: KIMBERLI SAMUEL MD, Dipika Ochoa MD

## 2018-09-21 ENCOUNTER — APPOINTMENT (OUTPATIENT)
Dept: BEHAVIORAL HEALTH | Facility: PHYSICIAN GROUP | Age: 48
End: 2018-09-21
Payer: MEDICAID

## 2018-09-24 DIAGNOSIS — J45.40 MODERATE PERSISTENT ASTHMA WITHOUT COMPLICATION: ICD-10-CM

## 2018-09-24 RX ORDER — MOMETASONE FUROATE AND FORMOTEROL FUMARATE DIHYDRATE 200; 5 UG/1; UG/1
AEROSOL RESPIRATORY (INHALATION)
Qty: 39 G | Refills: 0 | Status: SHIPPED | OUTPATIENT
Start: 2018-09-24 | End: 2018-10-23 | Stop reason: SDUPTHER

## 2018-10-23 DIAGNOSIS — J45.40 MODERATE PERSISTENT ASTHMA WITHOUT COMPLICATION: ICD-10-CM

## 2018-10-23 RX ORDER — MOMETASONE FUROATE AND FORMOTEROL FUMARATE DIHYDRATE 200; 5 UG/1; UG/1
AEROSOL RESPIRATORY (INHALATION)
Qty: 39 G | Refills: 0 | Status: SHIPPED | OUTPATIENT
Start: 2018-10-23 | End: 2019-06-26

## 2018-10-25 DIAGNOSIS — F41.9 ANXIETY: ICD-10-CM

## 2018-10-25 RX ORDER — CLONAZEPAM 1 MG/1
TABLET ORAL
Qty: 30 TAB | Refills: 1 | Status: SHIPPED
Start: 2018-10-25 | End: 2018-11-24

## 2018-10-26 DIAGNOSIS — E78.5 HYPERLIPIDEMIA, UNSPECIFIED HYPERLIPIDEMIA TYPE: ICD-10-CM

## 2018-10-29 DIAGNOSIS — F40.01 PANIC DISORDER WITH AGORAPHOBIA: ICD-10-CM

## 2018-10-29 RX ORDER — CLONAZEPAM 0.5 MG/1
0.5 TABLET ORAL 2 TIMES DAILY
Qty: 60 TAB | Refills: 0 | Status: SHIPPED
Start: 2018-10-29 | End: 2018-11-28

## 2018-10-29 RX ORDER — ATORVASTATIN CALCIUM 10 MG/1
TABLET, FILM COATED ORAL
Qty: 90 TAB | Refills: 0 | Status: SHIPPED | OUTPATIENT
Start: 2018-10-29 | End: 2019-01-21 | Stop reason: SDUPTHER

## 2018-11-08 DIAGNOSIS — J45.40 MODERATE PERSISTENT ASTHMA WITHOUT COMPLICATION: ICD-10-CM

## 2018-11-08 RX ORDER — TIOTROPIUM BROMIDE 18 UG/1
CAPSULE ORAL; RESPIRATORY (INHALATION)
Qty: 30 CAP | Refills: 0 | Status: SHIPPED | OUTPATIENT
Start: 2018-11-08 | End: 2018-12-03 | Stop reason: SDUPTHER

## 2018-11-16 ENCOUNTER — OFFICE VISIT (OUTPATIENT)
Dept: BEHAVIORAL HEALTH | Facility: CLINIC | Age: 48
End: 2018-11-16
Payer: COMMERCIAL

## 2018-11-16 VITALS
BODY MASS INDEX: 35.15 KG/M2 | HEIGHT: 62 IN | DIASTOLIC BLOOD PRESSURE: 78 MMHG | WEIGHT: 191 LBS | SYSTOLIC BLOOD PRESSURE: 128 MMHG | HEART RATE: 76 BPM | RESPIRATION RATE: 16 BRPM

## 2018-11-16 DIAGNOSIS — F41.0 PANIC DISORDER: ICD-10-CM

## 2018-11-16 DIAGNOSIS — F31.81 BIPOLAR 2 DISORDER (HCC): ICD-10-CM

## 2018-11-16 DIAGNOSIS — F43.10 PTSD (POST-TRAUMATIC STRESS DISORDER): ICD-10-CM

## 2018-11-16 PROCEDURE — 90833 PSYTX W PT W E/M 30 MIN: CPT | Performed by: PSYCHIATRY & NEUROLOGY

## 2018-11-16 PROCEDURE — 99213 OFFICE O/P EST LOW 20 MIN: CPT | Performed by: PSYCHIATRY & NEUROLOGY

## 2018-11-16 RX ORDER — BUPROPION HYDROCHLORIDE 150 MG/1
150 TABLET ORAL EVERY MORNING
Qty: 30 TAB | Refills: 2 | Status: SHIPPED | OUTPATIENT
Start: 2018-11-16 | End: 2019-06-26

## 2018-11-16 ASSESSMENT — PATIENT HEALTH QUESTIONNAIRE - PHQ9: CLINICAL INTERPRETATION OF PHQ2 SCORE: 0

## 2018-11-16 NOTE — BH THERAPY
"[facility]  PSYCHIATRIC FOLLOW-UP NOTE    Name: Ambreen Higuera  MRN: [id number]  : 1970  Age: 48 y.o.  Date of assessment: 2018  PCP: Daniel Brand M.D.  Persons in attendance: Patient  REASON FOR VISIT/CHIEF COMPLAINT (as stated by Patient):  Ambreen Higuera is a 48 y.o., White female, attending follow-up appointment for   Chief Complaint   Patient presents with   • Medication Management     The patient is seen today for follow up on her bipolar disorder, anxiet,  insomnia, and PTSD.   .      HISTORY OF PRESENT ILLNESS:    This is a 47 yo female, lives with her boyfriend, seen today for follow up after four months. The patient stopped abilify and she is taking latuda 60 mg every day. The patient stopped bupropion . The patient reported that she is feeling depressed all the time. The patient has been taking clonazepam for her anxiety. The patient is getting  in few months. 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 The patient is trying to get medical disability and she went infrond of the .     PSYCHOSOCIAL CHANGES SINCE PREVIOUS CONTACT:  Depressed and anxious all the time.    RESPONSE TO TREATMENT:  She is taking latuda 60 mg clonazepam 1 mg .    MEDICATION SIDE EFFECTS:  Weight gain.    REVIEW OF SYSTEMS:        Constitutional positive - obesity.   Eyes negative   Ears/Nose/Mouth/Throat negative   Cardiovascular negative   Respiratory positive - asthma.   Gastrointestinal negative   Genitourinary negative   Muscular negative   Integumentary negative   Neurological positive - chronic back pain   Endocrine positive - hyperlipidemia.   Hematologic/Lymphatic negative       PSYCHIATRIC EXAMINATION/MENTAL STATUS  /78   Pulse 76   Resp 16   Ht 1.575 m (5' 2.01\")   Wt 86.6 kg (191 lb)   LMP 2001   BMI 34.93 kg/m²   Participation: Active verbal participation, Attentive and " Engaged  Grooming:Neat  Orientation: Alert and Fully Oriented  Eye contact: Good  Behavior:Calm   Mood: Depressed and Anxious  Affect: Sad and Anxious  Thought process: Logical and Goal-directed  Thought content:  Within normal limits  Speech: Rate within normal limits and Volume within normal limits  Perception:  Within normal limits  Memory:  No gross evidence of memory deficits  Insight: Good  Judgment: Good  Family/couple interaction observations:   Other:    Current risk:    Suicide: Low   Homicide: Low   Self-harm: Low  Relapse: Low  Other:   Crisis Safety Plan reviewed?Yes  If evidence of imminent risk is present, intervention/plan:    Medical Records/Labs/Diagnostic Tests Reviewed: yes.    Medical Records/Labs/Diagnostic Tests Ordered: none.    DIAGNOSTIC IMPRESSION(S):  1. Bipolar 2 disorder (HCC)    2. PTSD (post-traumatic stress disorder)    3. Panic disorder           ASSESSMENT AND PLAN:    1. Bipolar 2 disorder  2. PTSD.  Restart bupropion  mg one a day # 30 refills two.    3. Panic disorder.  Clonazepam 1 mg one day prn    4. Follow up three months.    16 minutes were spent in psychotherapy.  (If greater than 16 minutes, add [id number] code)   Topics addressed in psychotherapy include:  Psycho education and cognitive clarifications.  We discussed her negative automatic thoughts and helped her to process it.  Encouraged her to use mediation and relaxation method.  Darius Santos M.D.

## 2018-11-27 ENCOUNTER — OFFICE VISIT (OUTPATIENT)
Dept: MEDICAL GROUP | Facility: MEDICAL CENTER | Age: 48
End: 2018-11-27
Attending: FAMILY MEDICINE
Payer: MEDICAID

## 2018-11-27 VITALS
HEIGHT: 62 IN | HEART RATE: 65 BPM | WEIGHT: 187 LBS | OXYGEN SATURATION: 100 % | BODY MASS INDEX: 34.41 KG/M2 | RESPIRATION RATE: 14 BRPM | DIASTOLIC BLOOD PRESSURE: 90 MMHG | SYSTOLIC BLOOD PRESSURE: 140 MMHG | TEMPERATURE: 97 F

## 2018-11-27 DIAGNOSIS — J40 BRONCHITIS: ICD-10-CM

## 2018-11-27 PROCEDURE — 99214 OFFICE O/P EST MOD 30 MIN: CPT | Performed by: FAMILY MEDICINE

## 2018-11-27 PROCEDURE — 99212 OFFICE O/P EST SF 10 MIN: CPT | Performed by: FAMILY MEDICINE

## 2018-11-27 RX ORDER — AZITHROMYCIN 250 MG/1
TABLET, FILM COATED ORAL
Qty: 6 TAB | Refills: 0 | Status: SHIPPED | OUTPATIENT
Start: 2018-11-27 | End: 2019-01-07

## 2018-11-27 ASSESSMENT — ENCOUNTER SYMPTOMS
RHINORRHEA: 1
CHILLS: 0
VOMITING: 0
SINUS PAIN: 1
BACK PAIN: 1
ABDOMINAL PAIN: 0
SPUTUM PRODUCTION: 1
TINGLING: 1
PALPITATIONS: 0
DIARRHEA: 0
SWOLLEN GLANDS: 1
FEVER: 1
COUGH: 1
WHEEZING: 1
NAUSEA: 0
SORE THROAT: 1
HEADACHES: 0
NECK PAIN: 1
SHORTNESS OF BREATH: 0

## 2018-11-27 NOTE — PROGRESS NOTES
"Subjective:      Ambreen Higuera is a 48 y.o. female who presents with URI (x4 days)            URI    This is a new problem. The current episode started in the past 7 days. The maximum temperature recorded prior to her arrival was 100.4 - 100.9 F. Associated symptoms include congestion, coughing, neck pain, rhinorrhea, sinus pain, a sore throat, swollen glands and wheezing. Pertinent negatives include no abdominal pain, chest pain, diarrhea, dysuria, ear pain, headaches, joint pain, joint swelling, nausea, plugged ear sensation, rash, sneezing or vomiting.       Review of Systems   Constitutional: Positive for fever. Negative for chills.   HENT: Positive for congestion, rhinorrhea, sinus pain and sore throat. Negative for ear pain, hearing loss, sneezing and tinnitus.    Respiratory: Positive for cough, sputum production and wheezing. Negative for shortness of breath.    Cardiovascular: Negative for chest pain and palpitations.   Gastrointestinal: Negative for abdominal pain, diarrhea, nausea and vomiting.   Genitourinary: Negative for dysuria.   Musculoskeletal: Positive for back pain and neck pain. Negative for joint pain.   Skin: Negative for rash.   Neurological: Positive for tingling. Negative for headaches.          Objective:     /90 (BP Location: Left arm, Patient Position: Sitting)   Pulse 65   Temp 36.1 °C (97 °F)   Resp 14   Ht 1.575 m (5' 2\")   Wt 84.8 kg (187 lb)   LMP 03/08/2001   SpO2 100%   BMI 34.20 kg/m²      Physical Exam   Constitutional: She is oriented to person, place, and time.   BMI 34   HENT:   Head: Normocephalic and atraumatic.   Congestion, pharyngeal erythema, TMJ pain   Cardiovascular: Normal rate, regular rhythm and normal heart sounds.  Exam reveals no friction rub.    No murmur heard.  Pulmonary/Chest: Effort normal. No respiratory distress. She has no wheezes. She has no rales.   Slight coarse breath sounds    Abdominal: Soft. Bowel sounds are normal. She " exhibits no distension. There is no tenderness.   Neurological: She is alert and oriented to person, place, and time.   Skin: Skin is warm and dry.   Psychiatric: She has a normal mood and affect. Her behavior is normal.   Nursing note and vitals reviewed.              Assessment/Plan:     1. Bronchitis  Will have her continue to use her inhaler as directed and will have her hold onto an abx if her sxs should worsen after 7-10 days. Will continue to follow.   - azithromycin (ZITHROMAX Z-SEVERINO) 250 MG Tab; Use as directed  Dispense: 6 Tab; Refill: 0

## 2018-12-03 DIAGNOSIS — J45.40 MODERATE PERSISTENT ASTHMA WITHOUT COMPLICATION: ICD-10-CM

## 2018-12-03 DIAGNOSIS — B02.9 HERPES ZOSTER WITHOUT COMPLICATION: ICD-10-CM

## 2018-12-03 RX ORDER — GABAPENTIN 300 MG/1
CAPSULE ORAL
Qty: 90 CAP | Refills: 0 | Status: SHIPPED | OUTPATIENT
Start: 2018-12-03 | End: 2019-01-05 | Stop reason: SDUPTHER

## 2018-12-03 RX ORDER — TIOTROPIUM BROMIDE 18 UG/1
CAPSULE ORAL; RESPIRATORY (INHALATION)
Qty: 30 CAP | Refills: 0 | Status: SHIPPED | OUTPATIENT
Start: 2018-12-03 | End: 2019-01-14 | Stop reason: SDUPTHER

## 2018-12-19 DIAGNOSIS — J30.89 SEASONAL ALLERGIC RHINITIS DUE TO OTHER ALLERGIC TRIGGER: ICD-10-CM

## 2018-12-19 DIAGNOSIS — F41.8 DEPRESSION WITH ANXIETY: ICD-10-CM

## 2018-12-19 DIAGNOSIS — I10 ESSENTIAL HYPERTENSION: ICD-10-CM

## 2018-12-19 DIAGNOSIS — E78.5 HYPERLIPIDEMIA, UNSPECIFIED HYPERLIPIDEMIA TYPE: ICD-10-CM

## 2018-12-24 RX ORDER — HYDROCHLOROTHIAZIDE 12.5 MG/1
CAPSULE, GELATIN COATED ORAL
Qty: 30 CAP | Refills: 0 | Status: SHIPPED | OUTPATIENT
Start: 2018-12-24 | End: 2019-01-21 | Stop reason: SDUPTHER

## 2019-01-05 DIAGNOSIS — B02.9 HERPES ZOSTER WITHOUT COMPLICATION: ICD-10-CM

## 2019-01-07 ENCOUNTER — OFFICE VISIT (OUTPATIENT)
Dept: MEDICAL GROUP | Facility: MEDICAL CENTER | Age: 49
End: 2019-01-07
Attending: FAMILY MEDICINE
Payer: MEDICAID

## 2019-01-07 VITALS
HEART RATE: 78 BPM | RESPIRATION RATE: 14 BRPM | DIASTOLIC BLOOD PRESSURE: 80 MMHG | TEMPERATURE: 98.2 F | BODY MASS INDEX: 35.7 KG/M2 | WEIGHT: 194 LBS | SYSTOLIC BLOOD PRESSURE: 130 MMHG | OXYGEN SATURATION: 95 % | HEIGHT: 62 IN

## 2019-01-07 DIAGNOSIS — R11.0 NAUSEA: ICD-10-CM

## 2019-01-07 DIAGNOSIS — B35.4 TINEA CORPORIS: ICD-10-CM

## 2019-01-07 PROCEDURE — 99214 OFFICE O/P EST MOD 30 MIN: CPT | Performed by: FAMILY MEDICINE

## 2019-01-07 PROCEDURE — 99212 OFFICE O/P EST SF 10 MIN: CPT | Performed by: FAMILY MEDICINE

## 2019-01-07 RX ORDER — ONDANSETRON 4 MG/1
4 TABLET, ORALLY DISINTEGRATING ORAL EVERY 6 HOURS PRN
Qty: 10 TAB | Refills: 0 | Status: SHIPPED | OUTPATIENT
Start: 2019-01-07 | End: 2019-06-26

## 2019-01-07 RX ORDER — GABAPENTIN 300 MG/1
CAPSULE ORAL
Qty: 90 CAP | Refills: 0 | Status: SHIPPED | OUTPATIENT
Start: 2019-01-07 | End: 2019-02-06 | Stop reason: SDUPTHER

## 2019-01-07 RX ORDER — CLOTRIMAZOLE 1 %
CREAM (GRAM) TOPICAL
Qty: 1 TUBE | Refills: 0 | Status: SHIPPED | OUTPATIENT
Start: 2019-01-07 | End: 2019-06-26

## 2019-01-07 RX ORDER — MONTELUKAST SODIUM 10 MG/1
TABLET ORAL
Qty: 90 TAB | Refills: 0 | Status: SHIPPED | OUTPATIENT
Start: 2019-01-07 | End: 2019-01-31 | Stop reason: SDUPTHER

## 2019-01-07 ASSESSMENT — ENCOUNTER SYMPTOMS
SHORTNESS OF BREATH: 0
COUGH: 0
FEVER: 0
VOMITING: 0
FATIGUE: 0
NEUROLOGICAL NEGATIVE: 1
ANOREXIA: 0
RHINORRHEA: 0
CHILLS: 0
SPUTUM PRODUCTION: 0
SORE THROAT: 0
NAIL CHANGES: 0
EYE PAIN: 0
NECK PAIN: 0
NAUSEA: 1
PALPITATIONS: 0
ABDOMINAL PAIN: 0
BACK PAIN: 1
DIARRHEA: 0

## 2019-01-07 ASSESSMENT — PATIENT HEALTH QUESTIONNAIRE - PHQ9: CLINICAL INTERPRETATION OF PHQ2 SCORE: 0

## 2019-01-08 NOTE — PROGRESS NOTES
"Subjective:      Ambreen Higuera is a 48 y.o. female who presents with Rash (x3 days)            Rash   This is a recurrent problem. The current episode started more than 1 year ago. The affected locations include the back and torso. The rash is characterized by itchiness and scaling. Associated with: has been using hot tub recently. Pertinent negatives include no anorexia, congestion, cough, diarrhea, eye pain, facial edema, fatigue, fever, joint pain, nail changes, rhinorrhea, shortness of breath, sore throat or vomiting. Treatments tried: will try lotrimin cream as directed for 1 week, keep area clean and dry.        Review of Systems   Constitutional: Negative for chills, fatigue and fever.   HENT: Negative for congestion, hearing loss, rhinorrhea, sore throat and tinnitus.    Eyes: Negative for pain.   Respiratory: Negative for cough, sputum production and shortness of breath.    Cardiovascular: Negative for chest pain and palpitations.   Gastrointestinal: Positive for nausea. Negative for abdominal pain, anorexia, diarrhea and vomiting.   Musculoskeletal: Positive for back pain. Negative for joint pain and neck pain.   Skin: Positive for itching and rash. Negative for nail changes.   Neurological: Negative.           Objective:     /80 (BP Location: Left arm, Patient Position: Sitting)   Pulse 78   Temp 36.8 °C (98.2 °F)   Resp 14   Ht 1.575 m (5' 2\")   Wt 88 kg (194 lb)   LMP 03/08/2001   SpO2 95%   BMI 35.48 kg/m²      Physical Exam   Constitutional: She is oriented to person, place, and time.   BMI 35.48   HENT:   Head: Normocephalic and atraumatic.   Cardiovascular: Normal rate and regular rhythm.  Exam reveals no friction rub.    No murmur heard.  Pulmonary/Chest: Effort normal and breath sounds normal. No respiratory distress. She has no wheezes.   Abdominal: Soft. Bowel sounds are normal. She exhibits no distension. There is tenderness.   Vague epigastric tenderness   Neurological: " She is alert and oriented to person, place, and time.   Skin: Skin is warm and dry. Rash noted. There is erythema.   Annular lesions with scalloped edges   Psychiatric: She has a normal mood and affect. Her behavior is normal.   Nursing note and vitals reviewed.              Assessment/Plan:     1. Tinea corporis  Will have her use lotrimin cream bid and keep area clean and dry. Will continue to follow.   - clotrimazole (LOTRIMIN) 1 % Cream; Apply to affected area bid for 1 week  Dispense: 1 Tube; Refill: 0    2. Nausea  Will have her try zofran as needed. She has been getting nauseous in morning after taking her medications. Will continue to follow.  - ondansetron (ZOFRAN ODT) 4 MG TABLET DISPERSIBLE; Take 1 Tab by mouth every 6 hours as needed for Nausea.  Dispense: 10 Tab; Refill: 0

## 2019-01-14 DIAGNOSIS — J45.40 MODERATE PERSISTENT ASTHMA WITHOUT COMPLICATION: ICD-10-CM

## 2019-01-15 RX ORDER — TIOTROPIUM BROMIDE 18 UG/1
CAPSULE ORAL; RESPIRATORY (INHALATION)
Qty: 30 CAP | Refills: 0 | Status: SHIPPED | OUTPATIENT
Start: 2019-01-15 | End: 2019-02-10 | Stop reason: SDUPTHER

## 2019-01-21 DIAGNOSIS — E78.5 HYPERLIPIDEMIA, UNSPECIFIED HYPERLIPIDEMIA TYPE: ICD-10-CM

## 2019-01-21 DIAGNOSIS — F41.8 DEPRESSION WITH ANXIETY: ICD-10-CM

## 2019-01-21 DIAGNOSIS — I10 ESSENTIAL HYPERTENSION: ICD-10-CM

## 2019-01-21 DIAGNOSIS — J30.89 SEASONAL ALLERGIC RHINITIS DUE TO OTHER ALLERGIC TRIGGER: ICD-10-CM

## 2019-01-22 ENCOUNTER — OFFICE VISIT (OUTPATIENT)
Dept: BEHAVIORAL HEALTH | Facility: CLINIC | Age: 49
End: 2019-01-22
Payer: COMMERCIAL

## 2019-01-22 VITALS
HEART RATE: 76 BPM | RESPIRATION RATE: 14 BRPM | BODY MASS INDEX: 36.25 KG/M2 | SYSTOLIC BLOOD PRESSURE: 128 MMHG | HEIGHT: 62 IN | DIASTOLIC BLOOD PRESSURE: 82 MMHG | WEIGHT: 197 LBS

## 2019-01-22 DIAGNOSIS — F43.10 PTSD (POST-TRAUMATIC STRESS DISORDER): ICD-10-CM

## 2019-01-22 DIAGNOSIS — F31.81 BIPOLAR 2 DISORDER (HCC): ICD-10-CM

## 2019-01-22 DIAGNOSIS — B02.9 HERPES ZOSTER WITHOUT COMPLICATION: ICD-10-CM

## 2019-01-22 DIAGNOSIS — F40.01 PANIC DISORDER WITH AGORAPHOBIA: ICD-10-CM

## 2019-01-22 PROCEDURE — 99213 OFFICE O/P EST LOW 20 MIN: CPT | Performed by: PSYCHIATRY & NEUROLOGY

## 2019-01-22 PROCEDURE — 90833 PSYTX W PT W E/M 30 MIN: CPT | Performed by: PSYCHIATRY & NEUROLOGY

## 2019-01-22 RX ORDER — HYDROCHLOROTHIAZIDE 12.5 MG/1
CAPSULE, GELATIN COATED ORAL
Qty: 30 CAP | Refills: 0 | Status: SHIPPED | OUTPATIENT
Start: 2019-01-22 | End: 2019-02-20 | Stop reason: SDUPTHER

## 2019-01-22 RX ORDER — ATORVASTATIN CALCIUM 10 MG/1
TABLET, FILM COATED ORAL
Qty: 90 TAB | Refills: 0 | Status: SHIPPED | OUTPATIENT
Start: 2019-01-22 | End: 2019-04-20 | Stop reason: SDUPTHER

## 2019-01-22 RX ORDER — CLONAZEPAM 1 MG/1
1 TABLET ORAL
Qty: 30 TAB | Refills: 0 | Status: SHIPPED | OUTPATIENT
Start: 2019-01-22 | End: 2019-02-21

## 2019-01-22 ASSESSMENT — PATIENT HEALTH QUESTIONNAIRE - PHQ9
CLINICAL INTERPRETATION OF PHQ2 SCORE: 2
5. POOR APPETITE OR OVEREATING: 1 - SEVERAL DAYS
SUM OF ALL RESPONSES TO PHQ QUESTIONS 1-9: 7

## 2019-01-22 NOTE — BH THERAPY
"[facility]  PSYCHIATRIC FOLLOW-UP NOTE    Name: Ambreen Higuera  MRN: [id number]  : 1970  Age: 48 y.o.  Date of assessment: 2019  PCP: Daniel Brand M.D.  Persons in attendance: Patient  Total face-to-face time: 30 minutes    REASON FOR VISIT/CHIEF COMPLAINT (as stated by Patient):  Ambreen Higuera is a 48 y.o., White female, attending follow-up appointment for   Chief Complaint   Patient presents with   • Medication Management     The patient is seen today for follow up on her bipolar disorder, PTSD, and panic disorder.   .      HISTORY OF PRESENT ILLNESS:    This is 47 yo female, unemployed, seen today for follow up on her panic attacks, mood swings, and insomnia. The patient is helping her brother who has schizophrenia. The patients mom was taking care of him before . The patient is not working and she lives with her boyfriend. The patient had trouble holding her job because of her mood instability. The patient is waiting for her disability judgment.   The patient is not taking any opioids for her chronic pain.     PSYCHOSOCIAL CHANGES SINCE PREVIOUS CONTACT:  The patient is anxious and nervous all the time.    RESPONSE TO TREATMENT:  She is taking latuda 60 mg and clonazepam 1 mg prn.    MEDICATION SIDE EFFECTS:  Denied.    REVIEW OF SYSTEMS:        Constitutional positive - obesity   Eyes negative   Ears/Nose/Mouth/Throat negative   Cardiovascular negative   Respiratory positive - asthma.   Gastrointestinal negative   Genitourinary negative   Muscular negative   Integumentary negative   Neurological positive - chronic back pain   Endocrine positive - hyperlipidemia.   Hematologic/Lymphatic negative       PSYCHIATRIC EXAMINATION/MENTAL STATUS  /82   Pulse 76   Resp 14   Ht 1.575 m (5' 2.01\")   Wt 89.4 kg (197 lb)   LMP 2001   BMI 36.02 kg/m²   Participation: Active verbal participation, Attentive and Engaged  Grooming:Casual.  Orientation: Alert and Fully " Oriented  Eye contact: Good  Behavior:Calm   Mood: Anxious  Affect: Anxious  Thought process: Logical and Goal-directed  Thought content:  Within normal limits  Speech: Rate within normal limits and Volume within normal limits  Perception:  Within normal limits  Memory:  No gross evidence of memory deficits  Insight: Good  Judgment: Good  Family/couple interaction observations:   Other:    Current risk:    Suicide: Low   Homicide: Low   Self-harm: Low  Relapse: Low  Other:   Crisis Safety Plan reviewed?Yes  If evidence of imminent risk is present, intervention/plan:    Medical Records/Labs/Diagnostic Tests Reviewed: yes.    Medical Records/Labs/Diagnostic Tests Ordered: none.    DIAGNOSTIC IMPRESSION(S):  1. Bipolar 2 disorder (HCC)    2. PTSD (post-traumatic stress disorder)    3. Herpes zoster without complication    4. Panic disorder with agoraphobia           ASSESSMENT AND PLAN:    1. Bipolar 2 disorder  2. PTSD  Continue latuda  Continue bupropion  mg one a day.    3. Panic disorder  Clonazepam 1 mg one a day prn # 30 NR.    4. Follow up in three months.    17. minutes were spent in psychotherapy.  (If greater than 16 minutes, add [id number] code)   Topics addressed in psychotherapy include:  We discussed her unresolved emotional issues and helped her with emotional skills.  cognitive restructuring and behavioral changes.  Educated her about boundary violations   Darius Santos M.D.

## 2019-01-31 RX ORDER — MONTELUKAST SODIUM 10 MG/1
TABLET ORAL
Qty: 90 TAB | Refills: 0 | Status: SHIPPED | OUTPATIENT
Start: 2019-01-31 | End: 2019-06-26

## 2019-01-31 NOTE — TELEPHONE ENCOUNTER
Was the patient seen in the last year in this department? Yes    Does patient have an active prescription for medications requested? No     Received Request Via: Pharmacy       This was filled on 1/9/19

## 2019-02-06 DIAGNOSIS — B02.9 HERPES ZOSTER WITHOUT COMPLICATION: ICD-10-CM

## 2019-02-06 RX ORDER — GABAPENTIN 300 MG/1
CAPSULE ORAL
Qty: 90 CAP | Refills: 0 | Status: SHIPPED | OUTPATIENT
Start: 2019-02-06 | End: 2019-03-07 | Stop reason: SDUPTHER

## 2019-02-10 DIAGNOSIS — J45.40 MODERATE PERSISTENT ASTHMA WITHOUT COMPLICATION: ICD-10-CM

## 2019-02-11 RX ORDER — TIOTROPIUM BROMIDE 18 UG/1
CAPSULE ORAL; RESPIRATORY (INHALATION)
Qty: 30 CAP | Refills: 2 | Status: SHIPPED | OUTPATIENT
Start: 2019-02-11 | End: 2019-05-24 | Stop reason: SDUPTHER

## 2019-02-20 DIAGNOSIS — E78.5 HYPERLIPIDEMIA, UNSPECIFIED HYPERLIPIDEMIA TYPE: ICD-10-CM

## 2019-02-20 DIAGNOSIS — J30.89 SEASONAL ALLERGIC RHINITIS DUE TO OTHER ALLERGIC TRIGGER: ICD-10-CM

## 2019-02-20 DIAGNOSIS — I10 ESSENTIAL HYPERTENSION: ICD-10-CM

## 2019-02-20 DIAGNOSIS — F41.8 DEPRESSION WITH ANXIETY: ICD-10-CM

## 2019-02-20 RX ORDER — HYDROCHLOROTHIAZIDE 12.5 MG/1
CAPSULE, GELATIN COATED ORAL
Qty: 30 CAP | Refills: 0 | Status: SHIPPED | OUTPATIENT
Start: 2019-02-20 | End: 2019-03-24 | Stop reason: SDUPTHER

## 2019-02-27 DIAGNOSIS — F41.0 PANIC ATTACKS: ICD-10-CM

## 2019-02-27 RX ORDER — CLONAZEPAM 1 MG/1
TABLET ORAL
Qty: 30 TAB | Refills: 0 | Status: SHIPPED
Start: 2019-02-27 | End: 2019-03-20 | Stop reason: SDUPTHER

## 2019-02-28 ENCOUNTER — OFFICE VISIT (OUTPATIENT)
Dept: MEDICAL GROUP | Facility: MEDICAL CENTER | Age: 49
End: 2019-02-28
Attending: FAMILY MEDICINE
Payer: MEDICAID

## 2019-02-28 VITALS
DIASTOLIC BLOOD PRESSURE: 80 MMHG | HEART RATE: 86 BPM | TEMPERATURE: 97.5 F | SYSTOLIC BLOOD PRESSURE: 140 MMHG | HEIGHT: 62 IN | WEIGHT: 190 LBS | RESPIRATION RATE: 14 BRPM | OXYGEN SATURATION: 100 % | BODY MASS INDEX: 34.96 KG/M2

## 2019-02-28 DIAGNOSIS — R30.0 DYSURIA: ICD-10-CM

## 2019-02-28 DIAGNOSIS — F41.8 DEPRESSION WITH ANXIETY: ICD-10-CM

## 2019-02-28 DIAGNOSIS — L98.9 SKIN LESION OF RIGHT ARM: ICD-10-CM

## 2019-02-28 DIAGNOSIS — H72.91 RUPTURED EAR DRUM, RIGHT: ICD-10-CM

## 2019-02-28 PROCEDURE — 99214 OFFICE O/P EST MOD 30 MIN: CPT | Performed by: FAMILY MEDICINE

## 2019-02-28 PROCEDURE — 99213 OFFICE O/P EST LOW 20 MIN: CPT | Performed by: FAMILY MEDICINE

## 2019-02-28 RX ORDER — AMOXICILLIN 500 MG/1
500 CAPSULE ORAL 3 TIMES DAILY
Qty: 30 CAP | Refills: 0 | Status: SHIPPED | OUTPATIENT
Start: 2019-02-28 | End: 2019-04-29

## 2019-02-28 ASSESSMENT — ENCOUNTER SYMPTOMS
SHORTNESS OF BREATH: 0
PALPITATIONS: 0
SENSORY CHANGE: 0
DEPRESSION: 1
SPEECH CHANGE: 0
VOMITING: 0
TINGLING: 1
HALLUCINATIONS: 0
ABDOMINAL PAIN: 0
CHILLS: 0
TREMORS: 0
HEADACHES: 0
MUSCULOSKELETAL NEGATIVE: 1
NERVOUS/ANXIOUS: 1
COUGH: 0
NAUSEA: 0
FEVER: 0
SPUTUM PRODUCTION: 0

## 2019-02-28 ASSESSMENT — LIFESTYLE VARIABLES: SUBSTANCE_ABUSE: 0

## 2019-03-01 NOTE — PROGRESS NOTES
Subjective:      Ambreen Higuera is a 48 y.o. female who presents with Warts (right arm) and Otalgia (right)            Patient 48-year-old female here for recent onset of ear pain and discharge, rapidly growing lesion on the posterior aspect of her right upper arm, anxiety and depression, and dysuria.      She recently noticed decreased hearing in her right ear, that resulted in sudden ear pain with resultant discharge and some bleeding.  She is able to hear out of her right ear but has noticed some persistent discharge from her ear.  She is not running a fever has not had any recent upper respiratory infection.  But does have a history of problems with her ears in the past.  Will have her start amoxicillin 500 mg 3 times daily for the next 10 days.  We will recheck in approximately 2-3 weeks.  If she still having issues will refer to ENT for a further evaluation.  We will continue to follow.    She has an upcoming appointment with her psychiatrist in March for her worsening anxiety and depression.  She is currently on Wellbutrin and has been following up with her psychologist.  Recently she had been attempting to care for her brother with developmental delays.  Recently she had to stop caring for her brother, due to her worsened anxiety as well as depression secondary to frustration she had been having caring for her brother.  She states that she is not having any urges to harm herself or others, but she felt she was not able to continue caring for her developmentally disabled brother.    She also has a lesion on the posterior aspect of her right arm that has significantly increased in size in the last few months.  She states that the lesion is also tender, so a referral to dermatology will be made for a further evaluation and also possible management.  She does have a history of skin cancers in the past, and is concerned about this.  We will continue to follow.    She also has a history of frequent urinary  "tract infections.  She has been having some increase in frequency as well as dysuria.  We will have her go to the lab to provide a sample for a urinalysis and culture if necessary.  Since she will be started on amoxicillin for her possible ear infection we will hold off on any antibiotics for her dysuria at this point until we have a positive culture.  We will continue to follow.     Current medications, allergies, and problem list reviewed with patient and updated in EPIC.          Review of Systems   Constitutional: Negative for chills and fever.   HENT: Negative for hearing loss and tinnitus.    Respiratory: Negative for cough, sputum production and shortness of breath.    Cardiovascular: Negative for chest pain and palpitations.   Gastrointestinal: Negative for abdominal pain, nausea and vomiting.   Musculoskeletal: Negative.    Skin: Negative for rash.        Lesion right posterior arm   Neurological: Positive for tingling. Negative for tremors, sensory change, speech change and headaches.   Psychiatric/Behavioral: Positive for depression. Negative for hallucinations, substance abuse and suicidal ideas. The patient is nervous/anxious.           Objective:     /80 (BP Location: Left arm, Patient Position: Sitting)   Pulse 86   Temp 36.4 °C (97.5 °F)   Resp 14   Ht 1.575 m (5' 2\")   Wt 86.2 kg (190 lb)   LMP 03/08/2001   SpO2 100%   BMI 34.75 kg/m²      Physical Exam   Constitutional: She is oriented to person, place, and time.   BMI 34.75   Cardiovascular: Normal rate, regular rhythm and normal heart sounds.  Exam reveals no friction rub.    No murmur heard.  Pulmonary/Chest: Effort normal and breath sounds normal. No respiratory distress. She has no wheezes. She has no rales.   Abdominal: Soft. Bowel sounds are normal. She exhibits no distension. There is no tenderness.   Neurological: She is alert and oriented to person, place, and time.   Skin: Skin is warm and dry.   Approximately half " centimeter pedunculated lesion on the posterior aspect of her right arm   Psychiatric: Her behavior is normal.   Tearful at times in room when talking about situation with brother.   Nursing note and vitals reviewed.              Assessment/Plan:     1. Skin lesion of right arm  Referral to dermatology made for a further assessment as well as assistance in management of the lesion on the posterior aspect of her right arm.  We will continue to follow.  - REFERRAL TO DERMATOLOGY    2. Ruptured ear drum, right  We will have her start amoxicillin at 500 mg 3 times daily.  Patient also advised to not stick anything into her ear.  If her problem should persist will refer to ENT for further assessment and assistance in management.    3. Depression with anxiety  She will continue to use her antidepressant as previously directed and also continue to follow-up with her psychologist.  She has an appointment with her psychiatrist in March.  If she has any sudden worsening of her current symptoms or the urge to harm herself or others she has been advised to go to the emergency room for further assistance in management.    4. Dysuria  We will have her get a UA as well as reflex culture if necessary.  We will continue to follow.  - URINALYSIS,CULTURE IF INDICATED; Future

## 2019-03-07 DIAGNOSIS — B02.9 HERPES ZOSTER WITHOUT COMPLICATION: ICD-10-CM

## 2019-03-07 RX ORDER — GABAPENTIN 300 MG/1
CAPSULE ORAL
Qty: 90 CAP | Refills: 0 | Status: SHIPPED | OUTPATIENT
Start: 2019-03-07 | End: 2019-04-08 | Stop reason: SDUPTHER

## 2019-03-08 DIAGNOSIS — I10 ESSENTIAL HYPERTENSION: ICD-10-CM

## 2019-03-20 ENCOUNTER — OFFICE VISIT (OUTPATIENT)
Dept: BEHAVIORAL HEALTH | Facility: CLINIC | Age: 49
End: 2019-03-20
Payer: COMMERCIAL

## 2019-03-20 VITALS
BODY MASS INDEX: 34.78 KG/M2 | HEIGHT: 62 IN | DIASTOLIC BLOOD PRESSURE: 80 MMHG | RESPIRATION RATE: 16 BRPM | WEIGHT: 189 LBS | HEART RATE: 78 BPM | SYSTOLIC BLOOD PRESSURE: 126 MMHG

## 2019-03-20 DIAGNOSIS — F43.10 PTSD (POST-TRAUMATIC STRESS DISORDER): ICD-10-CM

## 2019-03-20 DIAGNOSIS — F41.0 PANIC ATTACKS: ICD-10-CM

## 2019-03-20 DIAGNOSIS — F31.81 BIPOLAR 2 DISORDER (HCC): ICD-10-CM

## 2019-03-20 PROCEDURE — 90833 PSYTX W PT W E/M 30 MIN: CPT | Performed by: PSYCHIATRY & NEUROLOGY

## 2019-03-20 PROCEDURE — 99213 OFFICE O/P EST LOW 20 MIN: CPT | Performed by: PSYCHIATRY & NEUROLOGY

## 2019-03-20 RX ORDER — CLONAZEPAM 1 MG/1
TABLET ORAL
Qty: 30 TAB | Refills: 1 | Status: SHIPPED | OUTPATIENT
Start: 2019-03-20 | End: 2019-04-19

## 2019-03-20 ASSESSMENT — PATIENT HEALTH QUESTIONNAIRE - PHQ9
5. POOR APPETITE OR OVEREATING: 1 - SEVERAL DAYS
CLINICAL INTERPRETATION OF PHQ2 SCORE: 4
SUM OF ALL RESPONSES TO PHQ QUESTIONS 1-9: 9

## 2019-03-20 NOTE — BH THERAPY
[facility]  PSYCHIATRIC FOLLOW-UP NOTE    Name: Ambreen Higuera  MRN: [id number]  : 1970  Age: 48 y.o.  Date of assessment: 3/20/2019  PCP: Daniel Brand M.D.  Persons in attendance: Patient  Total face-to-face time: 30 minutes    REASON FOR VISIT/CHIEF COMPLAINT (as stated by Patient):  Ambreen Higuear is a 48 y.o., White female, attending follow-up appointment for   Chief Complaint   Patient presents with   • Stress     The patient is seen today for follow up on her bipolar disorder, PTSD, and panic disorder. The patient was unable to fill her last prescription for clonazepam and she is out of it for two weeks now.    .      HISTORY OF PRESENT ILLNESS:    This is a 49 yo female, unemployed, seen today for follow up after two month. The patient went in front of the  and her she was denied disability again. The patient ran out og clonazepam two weeks ago and the pharmacy did not refill her last prescription on 19. The patient reported that she is getting  this Saturday in [place]. The patient reported that they are in financial trouble and she is looking for a job. The is in chronic pain all the time. The patient is terful, frustrated, and anxious today.    PSYCHOSOCIAL CHANGES SINCE PREVIOUS CONTACT:  The patient ran out clonazepam two weeks ago and she is very upset, irritable, and anxious all the time.    RESPONSE TO TREATMENT:  The patient is taking latuda 60 g and clonazepam 1 mg one d ay.    MEDICATION SIDE EFFECTS:  Denied.    REVIEW OF SYSTEMS:        Constitutional positive - obesity   Eyes negative   Ears/Nose/Mouth/Throat negative   Cardiovascular negative   Respiratory positive - asthma.   Gastrointestinal negative   Genitourinary negative   Muscular negative   Integumentary negative   Neurological positive - chronic back pain   Endocrine positive - hyperlipidemia.   Hematologic/Lymphatic negative       PSYCHIATRIC EXAMINATION/MENTAL STATUS  /80   Pulse 78  "  Resp 16   Ht 1.575 m (5' 2.01\")   Wt 85.7 kg (189 lb)   LMP 03/08/2001   BMI 34.56 kg/m²   Participation: Active verbal participation and Attentive  Grooming:Casual  Orientation: Alert and Fully Oriented  Eye contact: Limited  Behavior:Agitated and Tense   Mood: Depressed and Anxious  Affect: Sad, Anxious and Tearful  Thought process: Logical and Goal-directed  Thought content:  Within normal limits  Speech: Rate within normal limits and Volume within normal limits  Perception:  Within normal limits  Memory:  No gross evidence of memory deficits  Insight: Good  Judgment: Good  Family/couple interaction observations:   Other:    Current risk:    Suicide: Low   Homicide: Low   Self-harm: Low  Relapse: Low  Other:   Crisis Safety Plan reviewed?Yes  If evidence of imminent risk is present, intervention/plan:    Medical Records/Labs/Diagnostic Tests Reviewed: yes.    Medical Records/Labs/Diagnostic Tests Ordered: none.    DIAGNOSTIC IMPRESSION(S):  1. Bipolar 2 disorder (HCC)    2. PTSD (post-traumatic stress disorder)    3. Panic attacks           ASSESSMENT AND PLAN:    1. Bipolar 2 disorder  2. PTSD.  latuda 60 mg at bed time  Bupropion  mg one d ay.    3. Panic disorder.  Clonazepam 1 mg prn # 30 refills one.    4. Follow up in two months.    17 minutes were spent in psychotherapy.  (If greater than 16 minutes, add [id number] code)   Topics addressed in psychotherapy include:  Psycho education and cognitive clarifications.  Helped her with emotional regulations and distress tolerance.  Helped her to improve social skills, self control, and problem solving skills.  Darius Santos M.D.                   "

## 2019-03-24 DIAGNOSIS — J30.89 SEASONAL ALLERGIC RHINITIS DUE TO OTHER ALLERGIC TRIGGER: ICD-10-CM

## 2019-03-24 DIAGNOSIS — E78.5 HYPERLIPIDEMIA, UNSPECIFIED HYPERLIPIDEMIA TYPE: ICD-10-CM

## 2019-03-24 DIAGNOSIS — F41.8 DEPRESSION WITH ANXIETY: ICD-10-CM

## 2019-03-24 DIAGNOSIS — I10 ESSENTIAL HYPERTENSION: ICD-10-CM

## 2019-03-25 RX ORDER — HYDROCHLOROTHIAZIDE 12.5 MG/1
CAPSULE, GELATIN COATED ORAL
Qty: 90 CAP | Refills: 0 | Status: SHIPPED | OUTPATIENT
Start: 2019-03-25 | End: 2019-06-26

## 2019-04-03 ENCOUNTER — HOSPITAL ENCOUNTER (OUTPATIENT)
Facility: MEDICAL CENTER | Age: 49
End: 2019-04-03
Attending: FAMILY MEDICINE
Payer: MEDICAID

## 2019-04-03 ENCOUNTER — OFFICE VISIT (OUTPATIENT)
Dept: MEDICAL GROUP | Facility: MEDICAL CENTER | Age: 49
End: 2019-04-03
Attending: FAMILY MEDICINE
Payer: MEDICAID

## 2019-04-03 VITALS
RESPIRATION RATE: 14 BRPM | BODY MASS INDEX: 34.41 KG/M2 | HEART RATE: 66 BPM | WEIGHT: 187 LBS | TEMPERATURE: 97.2 F | SYSTOLIC BLOOD PRESSURE: 140 MMHG | OXYGEN SATURATION: 94 % | HEIGHT: 62 IN | DIASTOLIC BLOOD PRESSURE: 80 MMHG

## 2019-04-03 DIAGNOSIS — R35.0 URINARY FREQUENCY: ICD-10-CM

## 2019-04-03 DIAGNOSIS — R30.0 DYSURIA: ICD-10-CM

## 2019-04-03 LAB
APPEARANCE UR: CLEAR
BILIRUB UR STRIP-MCNC: NEGATIVE MG/DL
COLOR UR AUTO: CLEAR
GLUCOSE UR STRIP.AUTO-MCNC: NEGATIVE MG/DL
KETONES UR STRIP.AUTO-MCNC: NEGATIVE MG/DL
LEUKOCYTE ESTERASE UR QL STRIP.AUTO: NORMAL
NITRITE UR QL STRIP.AUTO: NEGATIVE
PH UR STRIP.AUTO: 7 [PH] (ref 5–8)
PROT UR QL STRIP: NEGATIVE MG/DL
RBC UR QL AUTO: NORMAL
SP GR UR STRIP.AUTO: 1.01
UROBILINOGEN UR STRIP-MCNC: 0.2 MG/DL

## 2019-04-03 PROCEDURE — 99214 OFFICE O/P EST MOD 30 MIN: CPT | Performed by: FAMILY MEDICINE

## 2019-04-03 PROCEDURE — 81002 URINALYSIS NONAUTO W/O SCOPE: CPT | Performed by: FAMILY MEDICINE

## 2019-04-03 PROCEDURE — 87086 URINE CULTURE/COLONY COUNT: CPT

## 2019-04-03 PROCEDURE — 87077 CULTURE AEROBIC IDENTIFY: CPT

## 2019-04-03 PROCEDURE — 87186 SC STD MICRODIL/AGAR DIL: CPT

## 2019-04-03 PROCEDURE — 99213 OFFICE O/P EST LOW 20 MIN: CPT | Performed by: FAMILY MEDICINE

## 2019-04-03 RX ORDER — PHENAZOPYRIDINE HYDROCHLORIDE 200 MG/1
200 TABLET, FILM COATED ORAL 3 TIMES DAILY PRN
Qty: 9 TAB | Refills: 0 | Status: SHIPPED | OUTPATIENT
Start: 2019-04-03 | End: 2019-04-29

## 2019-04-03 RX ORDER — SULFAMETHOXAZOLE AND TRIMETHOPRIM 800; 160 MG/1; MG/1
1 TABLET ORAL 2 TIMES DAILY
Qty: 10 TAB | Refills: 1 | Status: SHIPPED | OUTPATIENT
Start: 2019-04-03 | End: 2019-04-29

## 2019-04-03 ASSESSMENT — ENCOUNTER SYMPTOMS
FEVER: 0
FLANK PAIN: 0
TINGLING: 1
SPEECH CHANGE: 0
VOMITING: 0
ABDOMINAL PAIN: 0
PSYCHIATRIC NEGATIVE: 1
SWEATS: 0
BACK PAIN: 1
COUGH: 0
SPUTUM PRODUCTION: 0
SHORTNESS OF BREATH: 0
SENSORY CHANGE: 0
CHILLS: 0
TREMORS: 0
NAUSEA: 0
PALPITATIONS: 0
NECK PAIN: 1

## 2019-04-03 NOTE — PROGRESS NOTES
"Subjective:      Ambreen Higuera is a 48 y.o. female who presents with UTI            Dysuria    This is a recurrent problem. The current episode started in the past 7 days. The problem occurs every urination. The problem has been unchanged. The quality of the pain is described as stabbing. The pain is moderate. There has been no fever. She is sexually active. There is no history of pyelonephritis. Associated symptoms include frequency, hematuria, hesitancy and urgency. Pertinent negatives include no chills, discharge, flank pain, nausea, possible pregnancy, sweats or vomiting. She has tried nothing (We will get a urine culture, start patient on Bactrim and Pyridium as needed for symptoms.  She will stay well-hydrated we will continue to follow.) for the symptoms. Interstitial cystitis       Review of Systems   Constitutional: Negative for chills and fever.   HENT: Negative for hearing loss and tinnitus.    Respiratory: Negative for cough, sputum production and shortness of breath.    Cardiovascular: Negative for chest pain and palpitations.   Gastrointestinal: Negative for abdominal pain, nausea and vomiting.   Genitourinary: Positive for dysuria, frequency, hematuria, hesitancy and urgency. Negative for flank pain.   Musculoskeletal: Positive for back pain, joint pain and neck pain.   Skin: Negative for rash.   Neurological: Positive for tingling. Negative for tremors, sensory change and speech change.   Psychiatric/Behavioral: Negative.           Objective:     /80 (BP Location: Left arm, Patient Position: Sitting)   Pulse 66   Temp 36.2 °C (97.2 °F)   Resp 14   Ht 1.575 m (5' 2\")   Wt 84.8 kg (187 lb)   LMP 03/08/2001   SpO2 94%   BMI 34.20 kg/m²      Physical Exam   Constitutional: She is oriented to person, place, and time.   BMI 34.2   HENT:   Head: Normocephalic and atraumatic.   Cardiovascular: Normal rate, regular rhythm and normal heart sounds.  Exam reveals no friction rub.    No " murmur heard.  Pulmonary/Chest: Effort normal and breath sounds normal. No respiratory distress. She has no wheezes. She has no rales.   Abdominal: Soft. Bowel sounds are normal. She exhibits no distension.   No CVAT, discomfort over suprapubic area   Neurological: She is alert and oriented to person, place, and time.   Skin: Skin is warm and dry.   Psychiatric: She has a normal mood and affect. Her behavior is normal.   Nursing note and vitals reviewed.              Assessment/Plan:     1. Urinary frequency  We will send her urine out for culture, will start Bactrim as directed, will continue to follow.  - POCT Urinalysis  - sulfamethoxazole-trimethoprim (BACTRIM DS) 800-160 MG tablet; Take 1 Tab by mouth 2 times a day.  Dispense: 10 Tab; Refill: 1  - URINE CULTURE(NEW); Future    2. Dysuria  See above plan.

## 2019-04-06 LAB
BACTERIA UR CULT: ABNORMAL
BACTERIA UR CULT: ABNORMAL
SIGNIFICANT IND 70042: ABNORMAL
SITE SITE: ABNORMAL
SOURCE SOURCE: ABNORMAL

## 2019-04-08 DIAGNOSIS — B02.9 HERPES ZOSTER WITHOUT COMPLICATION: ICD-10-CM

## 2019-04-08 RX ORDER — GABAPENTIN 300 MG/1
CAPSULE ORAL
Qty: 90 CAP | Refills: 0 | Status: SHIPPED | OUTPATIENT
Start: 2019-04-08 | End: 2019-06-26

## 2019-04-20 DIAGNOSIS — E78.5 HYPERLIPIDEMIA, UNSPECIFIED HYPERLIPIDEMIA TYPE: ICD-10-CM

## 2019-04-22 RX ORDER — ATORVASTATIN CALCIUM 10 MG/1
TABLET, FILM COATED ORAL
Qty: 90 TAB | Refills: 0 | Status: SHIPPED | OUTPATIENT
Start: 2019-04-22 | End: 2019-06-26

## 2019-04-29 ENCOUNTER — PATIENT MESSAGE (OUTPATIENT)
Dept: MEDICAL GROUP | Facility: MEDICAL CENTER | Age: 49
End: 2019-04-29

## 2019-04-29 RX ORDER — HYDROCHLOROTHIAZIDE 25 MG/1
25 TABLET ORAL DAILY
Qty: 30 TAB | Refills: 3 | Status: SHIPPED | OUTPATIENT
Start: 2019-04-29 | End: 2019-09-23 | Stop reason: SDUPTHER

## 2019-04-29 NOTE — TELEPHONE ENCOUNTER
Will increase her HCTZ 1st and see if that changes her blood pressure. Will follow up at her next appt.  Thanks

## 2019-04-30 DIAGNOSIS — I10 ESSENTIAL HYPERTENSION: ICD-10-CM

## 2019-05-20 ENCOUNTER — OFFICE VISIT (OUTPATIENT)
Dept: BEHAVIORAL HEALTH | Facility: CLINIC | Age: 49
End: 2019-05-20
Payer: MEDICAID

## 2019-05-20 VITALS
HEART RATE: 62 BPM | RESPIRATION RATE: 16 BRPM | BODY MASS INDEX: 33.49 KG/M2 | WEIGHT: 182 LBS | SYSTOLIC BLOOD PRESSURE: 128 MMHG | DIASTOLIC BLOOD PRESSURE: 76 MMHG | HEIGHT: 62 IN

## 2019-05-20 DIAGNOSIS — F40.01 PANIC DISORDER WITH AGORAPHOBIA: ICD-10-CM

## 2019-05-20 DIAGNOSIS — F43.12 CHRONIC POST-TRAUMATIC STRESS DISORDER (PTSD): ICD-10-CM

## 2019-05-20 DIAGNOSIS — F31.81 BIPOLAR 2 DISORDER (HCC): ICD-10-CM

## 2019-05-20 PROCEDURE — 90833 PSYTX W PT W E/M 30 MIN: CPT | Performed by: PSYCHIATRY & NEUROLOGY

## 2019-05-20 PROCEDURE — 99213 OFFICE O/P EST LOW 20 MIN: CPT | Performed by: PSYCHIATRY & NEUROLOGY

## 2019-05-20 RX ORDER — CLONAZEPAM 1 MG/1
TABLET ORAL
Qty: 30 TAB | Refills: 1 | Status: SHIPPED
Start: 2019-06-14 | End: 2019-05-20 | Stop reason: SDUPTHER

## 2019-05-20 RX ORDER — CLONAZEPAM 1 MG/1
TABLET ORAL
Qty: 30 TAB | Refills: 1 | Status: SHIPPED
Start: 2019-06-14 | End: 2019-07-14

## 2019-05-20 ASSESSMENT — PATIENT HEALTH QUESTIONNAIRE - PHQ9: CLINICAL INTERPRETATION OF PHQ2 SCORE: 0

## 2019-05-20 NOTE — BH THERAPY
[facility]  PSYCHIATRIC FOLLOW-UP NOTE    Name: Ambreen Higuera  MRN: [id number]  : 1970  Age: 49 y.o.  Date of assessment: 2019  PCP: Daniel Brand M.D.  Persons in attendance: Patient  Total face-to-face time: 30 minutes    REASON FOR VISIT/CHIEF COMPLAINT (as stated by Patient):  Ambreen Higuera is a 49 y.o., White female, attending follow-up appointment for   Chief Complaint   Patient presents with   • Medication Management     The patient is seen today for follow up on her bipolar disorder, panic attacks, and PTSD.p   .      HISTORY OF PRESENT ILLNESS:    This is a 50 yo female, , employed, seen today for follow up after two months. The patient got  and she is working now. The patient reported that she is losing her insurance this month. The patient tried to get in to disability and she was denied few times. 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 The patient is going through pain management. The patient has been taking lautda and wellbutrin for her bipolar disorder. The patient is getting clonazepam 1 mg every day for few years.     PSYCHOSOCIAL CHANGES SINCE PREVIOUS CONTACT:  The patient reported that her mood has been stable and her panic attacks improved.     RESPONSE TO TREATMENT:  She is taking clonazepam 1 mg one a day for anxiety. She is on latuda and bupropion for her bipolar disorder.     MEDICATION SIDE EFFECTS:  Denied.    REVIEW OF SYSTEMS:        Constitutional positive - obesity   Eyes negative   Ears/Nose/Mouth/Throat negative   Cardiovascular negative   Respiratory positive - asthma.   Gastrointestinal negative   Genitourinary negative   Muscular negative   Integumentary negative   Neurological positive - chronic pain and neck surgery.   Endocrine positive - hyperlipidemia.   Hematologic/Lymphatic negative       PSYCHIATRIC EXAMINATION/MENTAL STATUS  /76   Pulse 62    "Resp 16   Ht 1.575 m (5' 2.01\")   Wt 82.6 kg (182 lb)   LMP 03/08/2001   BMI 33.28 kg/m²   Participation: Active verbal participation, Attentive and Engaged  Grooming:Neat  Orientation: Alert and Fully Oriented  Eye contact: Good  Behavior:Calm   Mood: Anxious  Affect: Flexible and Full range  Thought process: Logical and Goal-directed  Thought content:  Within normal limits  Speech: Rate within normal limits and Volume within normal limits  Perception:  Within normal limits  Memory:  No gross evidence of memory deficits  Insight: Good  Judgment: Good  Family/couple interaction observations:   Other:    Current risk:    Suicide: Low   Homicide: Low   Self-harm: Low  Relapse: Low  Other:   Crisis Safety Plan reviewed?Yes  If evidence of imminent risk is present, intervention/plan:    Medical Records/Labs/Diagnostic Tests Reviewed: yes.    Medical Records/Labs/Diagnostic Tests Ordered: none.    DIAGNOSTIC IMPRESSION(S):  1. Bipolar 2 disorder (HCC)    2. Chronic post-traumatic stress disorder (PTSD)    3. Panic disorder with agoraphobia           ASSESSMENT AND PLAN:  1. Bipolar 2 disorder.  latuda 60 mg at night  Bupropion  mg one a day.    2. Panic disorder  Clonazepam 1 mg one a day # 30 refills one.    3. This is the last visit and she agreed to follow up with her psychiatrist.    17. minutes were spent in psychotherapy.  (If greater than 16 minutes, add 70884 code)   Topics addressed in psychotherapy include:  We discussed termination of treatment today.  Darius Santos M.D.                   "

## 2019-05-24 DIAGNOSIS — J45.40 MODERATE PERSISTENT ASTHMA WITHOUT COMPLICATION: ICD-10-CM

## 2019-05-28 RX ORDER — TIOTROPIUM BROMIDE 18 UG/1
CAPSULE ORAL; RESPIRATORY (INHALATION)
Qty: 90 CAP | Refills: 0 | Status: SHIPPED | OUTPATIENT
Start: 2019-05-28 | End: 2019-06-26

## 2019-05-29 ENCOUNTER — HOSPITAL ENCOUNTER (OUTPATIENT)
Dept: LAB | Facility: MEDICAL CENTER | Age: 49
End: 2019-05-29
Attending: INTERNAL MEDICINE
Payer: MEDICAID

## 2019-05-29 LAB
AMYLASE SERPL-CCNC: 17 U/L (ref 20–103)
BASOPHILS # BLD AUTO: 1.1 % (ref 0–1.8)
BASOPHILS # BLD: 0.08 K/UL (ref 0–0.12)
CRP SERPL HS-MCNC: 0.78 MG/DL (ref 0–0.75)
EOSINOPHIL # BLD AUTO: 0.57 K/UL (ref 0–0.51)
EOSINOPHIL NFR BLD: 7.8 % (ref 0–6.9)
ERYTHROCYTE [DISTWIDTH] IN BLOOD BY AUTOMATED COUNT: 48.9 FL (ref 35.9–50)
HCT VFR BLD AUTO: 43.1 % (ref 37–47)
HGB BLD-MCNC: 15.4 G/DL (ref 12–16)
IMM GRANULOCYTES # BLD AUTO: 0.01 K/UL (ref 0–0.11)
IMM GRANULOCYTES NFR BLD AUTO: 0.1 % (ref 0–0.9)
LIPASE SERPL-CCNC: 27 U/L (ref 11–82)
LYMPHOCYTES # BLD AUTO: 2.21 K/UL (ref 1–4.8)
LYMPHOCYTES NFR BLD: 30.3 % (ref 22–41)
MCH RBC QN AUTO: 33 PG (ref 27–33)
MCHC RBC AUTO-ENTMCNC: 35.7 G/DL (ref 33.6–35)
MCV RBC AUTO: 92.3 FL (ref 81.4–97.8)
MONOCYTES # BLD AUTO: 0.57 K/UL (ref 0–0.85)
MONOCYTES NFR BLD AUTO: 7.8 % (ref 0–13.4)
NEUTROPHILS # BLD AUTO: 3.85 K/UL (ref 2–7.15)
NEUTROPHILS NFR BLD: 52.9 % (ref 44–72)
NRBC # BLD AUTO: 0 K/UL
NRBC BLD-RTO: 0 /100 WBC
PLATELET # BLD AUTO: 323 K/UL (ref 164–446)
PMV BLD AUTO: 9.9 FL (ref 9–12.9)
RBC # BLD AUTO: 4.67 M/UL (ref 4.2–5.4)
WBC # BLD AUTO: 7.3 K/UL (ref 4.8–10.8)

## 2019-05-29 PROCEDURE — 84165 PROTEIN E-PHORESIS SERUM: CPT

## 2019-05-29 PROCEDURE — 36415 COLL VENOUS BLD VENIPUNCTURE: CPT

## 2019-05-29 PROCEDURE — 85025 COMPLETE CBC W/AUTO DIFF WBC: CPT

## 2019-05-29 PROCEDURE — 83690 ASSAY OF LIPASE: CPT

## 2019-05-29 PROCEDURE — 86255 FLUORESCENT ANTIBODY SCREEN: CPT

## 2019-05-29 PROCEDURE — 84160 ASSAY OF PROTEIN ANY SOURCE: CPT

## 2019-05-29 PROCEDURE — 86140 C-REACTIVE PROTEIN: CPT

## 2019-05-29 PROCEDURE — 82150 ASSAY OF AMYLASE: CPT

## 2019-05-31 LAB — ANCA IGG TITR SER IF: NORMAL {TITER}

## 2019-06-01 LAB
ALBUMIN SERPL-MCNC: 4.67 G/DL (ref 3.75–5.01)
ALPHA1 GLOB SERPL ELPH-MCNC: 0.32 G/DL (ref 0.19–0.46)
ALPHA2 GLOB SERPL ELPH-MCNC: 0.81 G/DL (ref 0.48–1.05)
B-GLOBULIN SERPL ELPH-MCNC: 0.99 G/DL (ref 0.48–1.1)
EER PROT ELECT SER Q1092: NORMAL
GAMMA GLOB SERPL ELPH-MCNC: 0.81 G/DL (ref 0.62–1.51)
INTERPRETATION SERPL IFE-IMP: NORMAL
PROT SERPL-MCNC: 7.6 G/DL (ref 6–8.3)

## 2019-06-07 ENCOUNTER — HOSPITAL ENCOUNTER (OUTPATIENT)
Dept: RADIOLOGY | Facility: MEDICAL CENTER | Age: 49
End: 2019-06-07
Attending: INTERNAL MEDICINE
Payer: MEDICAID

## 2019-06-07 DIAGNOSIS — Z83.719 FAMILY HISTORY OF COLONIC POLYPS: ICD-10-CM

## 2019-06-07 DIAGNOSIS — R11.2 NAUSEA AND VOMITING, INTRACTABILITY OF VOMITING NOT SPECIFIED, UNSPECIFIED VOMITING TYPE: ICD-10-CM

## 2019-06-07 DIAGNOSIS — R73.09 IMPAIRED GLUCOSE TOLERANCE TEST: ICD-10-CM

## 2019-06-07 DIAGNOSIS — K21.9 GASTROESOPHAGEAL REFLUX DISEASE, ESOPHAGITIS PRESENCE NOT SPECIFIED: ICD-10-CM

## 2019-06-07 PROCEDURE — A9541 TC99M SULFUR COLLOID: HCPCS

## 2019-06-26 ENCOUNTER — OFFICE VISIT (OUTPATIENT)
Dept: CARDIOLOGY | Facility: PHYSICIAN GROUP | Age: 49
End: 2019-06-26
Payer: MEDICAID

## 2019-06-26 ENCOUNTER — TELEPHONE (OUTPATIENT)
Dept: CARDIOLOGY | Facility: PHYSICIAN GROUP | Age: 49
End: 2019-06-26

## 2019-06-26 VITALS
HEART RATE: 72 BPM | HEIGHT: 62 IN | WEIGHT: 175 LBS | DIASTOLIC BLOOD PRESSURE: 82 MMHG | BODY MASS INDEX: 32.2 KG/M2 | OXYGEN SATURATION: 97 % | SYSTOLIC BLOOD PRESSURE: 124 MMHG

## 2019-06-26 DIAGNOSIS — R00.2 PALPITATIONS: ICD-10-CM

## 2019-06-26 DIAGNOSIS — I20.89 ANGINA AT REST: ICD-10-CM

## 2019-06-26 DIAGNOSIS — R07.89 ATYPICAL CHEST PAIN: ICD-10-CM

## 2019-06-26 DIAGNOSIS — I10 ESSENTIAL HYPERTENSION, BENIGN: ICD-10-CM

## 2019-06-26 PROCEDURE — 99244 OFF/OP CNSLTJ NEW/EST MOD 40: CPT | Performed by: INTERNAL MEDICINE

## 2019-06-26 RX ORDER — POTASSIUM CHLORIDE 600 MG/1
8 TABLET, FILM COATED, EXTENDED RELEASE ORAL DAILY
Qty: 90 TAB | Refills: 3 | Status: SHIPPED | OUTPATIENT
Start: 2019-06-26

## 2019-06-26 RX ORDER — ROSUVASTATIN CALCIUM 5 MG/1
5 TABLET, COATED ORAL EVERY EVENING
Qty: 90 TAB | Refills: 3 | Status: SHIPPED | OUTPATIENT
Start: 2019-06-26

## 2019-06-26 RX ORDER — BISOPROLOL FUMARATE 5 MG/1
2.5 TABLET, FILM COATED ORAL DAILY
Qty: 45 TAB | Refills: 3 | Status: SHIPPED | OUTPATIENT
Start: 2019-06-26

## 2019-06-26 RX ORDER — ZOLPIDEM TARTRATE 10 MG/1
TABLET ORAL
Refills: 2 | COMMUNITY
Start: 2019-05-10

## 2019-06-26 NOTE — PROGRESS NOTES
Cardiology Initial Consultation Note    Date of note:    6/26/2019    Primary Care Provider: Daniel Brand M.D.  Referring Provider: Daniel Brand M.D.     Patient Name: Ambreen Higuera   YOB: 1970  MRN:              [id number]    Chief Complaint: chest pain    History of Present Illness: Ambreen Higuera is a 49 y.o. female whose current medical problems include hypertension, pre-diabetes, and dyslipidemia who is here for cardiac consultation for chest pain.    Around weeks ago she started having sudden onset of moderate severity substernal chest pressure associated with SOB and vertigo and palpitations as well as dizziness. These last for 30 seconds and resolve spontaneously.  Exercises 2-3 times a week for 20 to 30 minutes on the treadmill without symptoms.     She reports three previous episodes of moderate severity substernal palpitations which last for 15 seconds sometimes before she goes to work. Resolve spontaneously.     Of note, she was off work for 3 years and tried to get disability for neck and back pain however she was unable to.  Her symptoms did start when she started work around 2 months ago.       ROS  Constitution: Negative for chills, fever and night sweats.   HENT: Negative for nosebleeds.    Eyes: Negative for vision loss in left eye and vision loss in right eye.   Respiratory: Negative for hemoptysis.    Gastrointestinal: Negative for hematemesis, hematochezia and melena. + nausea, diarrhea  Genitourinary: Negative for hematuria.   Neurological: Negative for focal weakness, numbness and paresthesias.     + left arm numbness and pain from possible neck issues.       All other systems reviewed and discussed using a comprehensive questionnaire and are negative.       Past Medical History:   Diagnosis Date   • Anesthesia     n/v   • Arthritis     osteoarthritis    • ASTHMA 8/28/2009   • Bipolar disorder (HCC)    • Cancer (HCC) 2002    cervical and uterine   • Cataract     "  • Depression with anxiety 2/10/2010   • Diabetes (HCC)     pt states \"prediabetic\" not currently on any medication    • Heart burn    • Hepatitis C 2011   • High cholesterol    • Hypertension    • Indigestion    • Other specified symptom associated with female genital organs    • Pain     neck, back    • Psychiatric disorder     depression   • Urinary bladder disorder     chronic cystitis         Past Surgical History:   Procedure Laterality Date   • BREAST BIOPSY Left 9/14/2018    Procedure: BREAST BIOPSY- EXCISIONAL  AFTER WIRE LOC;  Surgeon: Dipika Ochoa M.D.;  Location: SURGERY SAME DAY [facility] ORS;  Service: General   • LESION EXCISION GENERAL Left 9/14/2018    Procedure: LESION EXCISION GENERAL - 1 CM NARE PAPILLOMA W/SIMPLE CLOSURE;  Surgeon: Geri Byrne M.D.;  Location: SURGERY SAME DAY [facility] ORS;  Service: General   • BREAST BIOPSY Left 6/15/2018    Procedure: BREAST BIOPSY/ EXCISIONAL AFTER WIRE LOC;  Surgeon: Dipika Ochoa M.D.;  Location: SURGERY SAME DAY [facility] ORS;  Service: General   • INJ,EPIDURAL,CERV/THOR SINGLE  10/22/2014    Performed by Miles Gaspar M.D. at [facility]   • CHOLECYSTECTOMY     • GYN SURGERY      hysterectomy   • GYN SURGERY      cervix removal   • OTHER ORTHOPEDIC SURGERY      c spine fusion   • US-NEEDLE CORE BX-BREAST PANEL           Current Outpatient Prescriptions   Medication Sig Dispense Refill   • zolpidem (AMBIEN) 10 MG Tab TK 1/2 TO 1 T PO QHS PRF SLEEP FOR 30 DAYS  2   • clonazePAM (KLONOPIN) 1 MG Tab Take one tablet by mouth as needed for 30 days. 30 Tab 1   • hydroCHLOROthiazide (HYDRODIURIL) 25 MG Tab Take 1 Tab by mouth every day. 30 Tab 3   • metoprolol (LOPRESSOR) 25 MG Tab TAKE 1/2 TABLET BY MOUTH TWICE DAILY 90 Tab 0   • oxybutynin SR (DITROPAN-XL) 10 MG CR tablet Take 10 mg by mouth every morning.     • atorvastatin (LIPITOR) 10 MG Tab TAKE 1 TABLET BY MOUTH EVERY DAY (Patient not taking: Reported on 6/26/2019) 90 Tab 0   • " "esomeprazole magnesium (NEXIUM) 40 MG Pack Take 40 mg by mouth every morning before breakfast.       No current facility-administered medications for this visit.          Allergies   Allergen Reactions   • Codeine Vomiting   • Doxycycline Nausea   • Latex      Rash    • Monodox Vomiting   • Morphine      Hallucinations    • Tape      Adhesive tape-rash. Paper tape okay.    • Vicodin [Hydrocodone-Acetaminophen] Vomiting         Family History   Problem Relation Age of Onset   • Cancer Father    • Cancer Maternal Aunt          Social History     Social History   • Marital status: Single     Spouse name: N/A   • Number of children: N/A   • Years of education: N/A     Occupational History   • Not on file.     Social History Main Topics   • Smoking status: Former Smoker     Packs/day: 0.50     Years: 15.00     Types: Cigarettes, Cigars     Quit date: 1/1/2005   • Smokeless tobacco: Never Used   • Alcohol use No   • Drug use: No   • Sexual activity: Not on file     Other Topics Concern   • Not on file     Social History Narrative   • No narrative on file         Physical Exam:  Ambulatory Vitals  /82 (BP Location: Right arm, Patient Position: Sitting, BP Cuff Size: Adult)   Pulse 72   Ht 1.575 m (5' 2.01\")   Wt 79.4 kg (175 lb)   SpO2 97%    Oxygen Therapy:  Pulse Oximetry: 97 %  BP Readings from Last 4 Encounters:   06/26/19 124/82   05/20/19 128/76   04/03/19 140/80   03/20/19 126/80       Weight/BMI: Body mass index is 32 kg/m².  Wt Readings from Last 4 Encounters:   06/26/19 79.4 kg (175 lb)   05/20/19 82.6 kg (182 lb)   04/03/19 84.8 kg (187 lb)   03/20/19 85.7 kg (189 lb)     General: No apparent distress  Eyes: nl conjunctiva  ENT: OP clear, normal external appearance of nose and ears  Neck: JVP 4 cm H2O, no carotid bruits  Lungs: normal respiratory effort, CTAB  Heart: RRR, no murmurs, no rubs or gallops, no edema bilateral lower extremities. No LV/RV heave on cardiac palpatation. 2+ bilateral radial " pulses.  2+ bilateral dp pulses.   Abdomen: soft, non tender, non distended, no masses, normal bowel sounds.  No HSM.  Extremities/MSK: no clubbing, no cyanosis  Neurological: No focal sensory deficits  Psychiatric: Appropriate affect, A/O x 3, intact judgement and insight  Skin: Warm extremities      Lab Data Review:  Lab Results   Component Value Date/Time    CHOLSTRLTOT 193 2018 07:58 AM     (H) 2018 07:58 AM    HDL 47 2018 07:58 AM    TRIGLYCERIDE 136 2018 07:58 AM       Lab Results   Component Value Date/Time    SODIUM 138 2018 07:58 AM    POTASSIUM 3.6 2018 07:58 AM    CHLORIDE 100 2018 07:58 AM    CO2 29 2018 07:58 AM    GLUCOSE 127 (H) 2018 07:58 AM    BUN 15 2018 07:58 AM    CREATININE 0.83 2018 07:58 AM     Lab Results   Component Value Date/Time    ALKPHOSPHAT 99 2018 07:58 AM    ASTSGOT 18 2018 07:58 AM    ALTSGPT 22 2018 07:58 AM    TBILIRUBIN 0.5 2018 07:58 AM      Lab Results   Component Value Date/Time    WBC 7.3 2019 08:51 AM     No components found for: HBGA1C  No components found for: TROPONIN  No components found for: BNP      Cardiac Imaging and Procedures Review:    EKG dated 2019 : My personal interpretation is NSR,  Normal EKG.     Radiology procedures.  CT scan reviewed, CT A/P - no coronary calcium, limited exam.       Medical Decision Makin. Essential hypertension, benign  Adequately controlled.  -BB, hctz  -start kcl 8mEq daily.       2. Atypical chest pain  With hypertension, dyslipidemia, pre-diabetes, will r/o ischemia, but due to age, intermediate risk, normal EKG.   -TMST      3. Palpitations  Likely paroxysmal SVT  -start bisoprolol 2.5mg PO daily instead of metoprolol, uptitrate to get BP <130/85 and for symptoms.         Return if symptoms worsen or fail to improve.      Wale Pearce MD, [facility]  [facility]  [place] [facility], [facility]  [address] [place] [address]  [place] [address]  Phone: [phone]  Fax: [phone]

## 2019-06-26 NOTE — PATIENT INSTRUCTIONS
Please start crestor ( also called rosuvastatin) 5mg once a day for cholesterol.     Please stop metoprolol and start bisoprolol 2.5mg once a day for blood pressure and fast heart rates.     Please schedule your treadmill stress test. Please do not take your bisoprolol on the day of your stress test.     Please start potassium 8mEq once a day.

## 2019-06-26 NOTE — TELEPHONE ENCOUNTER
Patient seen in [facility] today, as she was checking out to be scheduled for a stress test our  employee asked her if she could go up front to register  said they can schedule her up there also, the employee was trying to save patient time because you have to register up front anyway, patient started getting frustrated, then patient asked if she could schedule her  for an appointment with [facility]. Our  employee does not schedule new patients in our clinic we are instructed to have them call our main line to schedule. Patient's frustration began to increase Dr. Pearce tried to intervene to deescalate situation and explain to her why employee was doing what she was doing. Patient was demeaning to employee and Dr Pearce did not like the way the patient was talking to employee. Patient proceeded up to the front of hospital to continue complaints. Multiple [facility] supervisors were involved, and one spoke with Dr Pearce after situation to clarify exactly what happened. Dr Pearce wanted to make sure this situation was documented for the future.     Patient called [facility] office around 1:00 stating she tried to call [facility] imaging to schedule stress test and thy don't have the order, I called [facility] imaging and the order was placed for [place], will have Dr montemayor submit order.

## 2019-07-01 ENCOUNTER — TELEPHONE (OUTPATIENT)
Dept: CARDIOLOGY | Facility: MEDICAL CENTER | Age: 49
End: 2019-07-01

## 2019-07-01 NOTE — TELEPHONE ENCOUNTER
PAR sent to patient's plan:    Ambreen Higuera (Giraldo: [id number]) - [id number]   Need help? Call us at [phone]     Status   Sent to [facility]   Next Steps   The plan will fax you a determination, typically within 1 to 5 business days.  How do I follow up?   DrugRosuvastatin Calcium 5MG tablets   [place] Medicaid Pharmacy Authorization General FormPrior Authorization Form for General [phone]   Original Claim [id number]

## 2019-07-17 ENCOUNTER — HOSPITAL ENCOUNTER (OUTPATIENT)
Dept: RADIOLOGY | Facility: MEDICAL CENTER | Age: 49
End: 2019-07-17
Attending: FAMILY MEDICINE
Payer: MEDICAID

## 2019-07-17 DIAGNOSIS — R92.8 ABNORMAL MAMMOGRAM: ICD-10-CM

## 2019-07-17 PROCEDURE — G0279 TOMOSYNTHESIS, MAMMO: HCPCS

## 2019-07-17 PROCEDURE — 76642 ULTRASOUND BREAST LIMITED: CPT | Mod: LT

## 2019-07-23 ENCOUNTER — PATIENT MESSAGE (OUTPATIENT)
Dept: MEDICAL GROUP | Facility: MEDICAL CENTER | Age: 49
End: 2019-07-23

## 2019-07-23 DIAGNOSIS — A49.02 MRSA (METHICILLIN RESISTANT STAPHYLOCOCCUS AUREUS) INFECTION: ICD-10-CM

## 2019-07-23 DIAGNOSIS — J45.40 MODERATE PERSISTENT ASTHMA WITHOUT COMPLICATION: ICD-10-CM

## 2019-07-23 RX ORDER — ALBUTEROL SULFATE 90 UG/1
2 AEROSOL, METERED RESPIRATORY (INHALATION) EVERY 6 HOURS PRN
Qty: 1 INHALER | Refills: 3 | Status: SHIPPED | OUTPATIENT
Start: 2019-07-23

## 2019-08-20 ENCOUNTER — HOSPITAL ENCOUNTER (OUTPATIENT)
Dept: LAB | Facility: MEDICAL CENTER | Age: 49
End: 2019-08-20
Attending: INTERNAL MEDICINE
Payer: MEDICAID

## 2019-08-20 PROCEDURE — 36415 COLL VENOUS BLD VENIPUNCTURE: CPT

## 2019-08-20 PROCEDURE — 87522 HEPATITIS C REVRS TRNSCRPJ: CPT

## 2019-08-22 LAB
HCV RNA SERPL NAA+PROBE-ACNC: NOT DETECTED IU/ML
HCV RNA SERPL NAA+PROBE-LOG IU: NOT DETECTED LOG IU/ML
HCV RNA SERPL QL NAA+PROBE: NOT DETECTED

## 2019-09-12 ENCOUNTER — OFFICE VISIT (OUTPATIENT)
Dept: MEDICAL GROUP | Facility: MEDICAL CENTER | Age: 49
End: 2019-09-12
Attending: FAMILY MEDICINE
Payer: MEDICAID

## 2019-09-12 VITALS
BODY MASS INDEX: 31.52 KG/M2 | WEIGHT: 171.3 LBS | HEART RATE: 69 BPM | DIASTOLIC BLOOD PRESSURE: 72 MMHG | HEIGHT: 62 IN | SYSTOLIC BLOOD PRESSURE: 116 MMHG | OXYGEN SATURATION: 95 % | RESPIRATION RATE: 14 BRPM | TEMPERATURE: 97 F

## 2019-09-12 DIAGNOSIS — F31.9 BIPOLAR 1 DISORDER (HCC): ICD-10-CM

## 2019-09-12 DIAGNOSIS — R53.83 OTHER FATIGUE: ICD-10-CM

## 2019-09-12 DIAGNOSIS — M25.50 ARTHRALGIA, UNSPECIFIED JOINT: ICD-10-CM

## 2019-09-12 PROCEDURE — 99214 OFFICE O/P EST MOD 30 MIN: CPT | Performed by: FAMILY MEDICINE

## 2019-09-12 RX ORDER — DICYCLOMINE HYDROCHLORIDE 10 MG/1
10 CAPSULE ORAL
COMMUNITY

## 2019-09-12 RX ORDER — CHOLESTYRAMINE LIGHT 4 G/5.7G
POWDER, FOR SUSPENSION ORAL 2 TIMES DAILY
COMMUNITY

## 2019-09-12 RX ORDER — CLONAZEPAM 1 MG/1
0.5 TABLET ORAL 2 TIMES DAILY
COMMUNITY
End: 2019-11-05 | Stop reason: SDUPTHER

## 2019-09-12 RX ORDER — RISPERIDONE 0.5 MG/1
0.5 TABLET ORAL 2 TIMES DAILY
COMMUNITY

## 2019-09-12 NOTE — PROGRESS NOTES
CC: Here to establish care, reports fatigue and generalized joint pain, chronic    HPI: New to me, established with the clinic PCP is leaving  Ambreen presents today to establish care, 49 years old female with past medical history significant for chronic pain and fatigue and tiredness, bipolar disorder, hypertension,  Reviewed medical history records and discussed the following concerns as follow today:      Hypertension  Well-controlled on medications blood pressure at the office today 116/72 patient is asymptomatic no headache or chest pain or shortness of breath  Other fatigue  Reports in general tiredness fatigue and low energy, patient said this is chronic but recently she has been feeling more tired with some mild unintentional weight loss    Arthralgia, unspecified joint  Reports generalized joint pain back pain and neck pain, patient said she also attends pain clinic and it was suggested for her to rule out lupus, she reports with the joint pain and fatigue and tiredness on and off rashes in her face and other parts of the body.    Bipolar 1 disorder     Well-controlled on medications continues to follow-up with psychiatrist, discussed with the patient's her medications today she is on Risperdal and on Ambien and Klonopin.  Discussed with the patient to review with her psychiatrist medication Ambien and Klonopin because both work on the same receptors to prevent synergistic effect or side effects related to interaction.  She voices understanding      Patient Active Problem List    Diagnosis Date Noted   • Moderate persistent asthma without complication 03/21/2017   • Depression with anxiety 02/10/2010   • PTSD (post-traumatic stress disorder) 02/10/2010   • Insomnia 08/28/2009   • Hyperlipidemia 08/28/2009       Current Outpatient Medications   Medication Sig Dispense Refill   • risperiDONE (RISPERDAL) 0.5 MG Tab Take 0.5 mg by mouth 2 times a day.     • cholestyramine (QUESTRAN,PREVALITE) 4 GM Pack Take  by  mouth 2 times a day.     • clonazePAM (KLONOPIN) 1 MG Tab Take 0.5 mg by mouth 2 times a day.     • dicyclomine (BENTYL) 10 MG Cap Take 10 mg by mouth 4 Times a Day,Before Meals and at Bedtime.     • albuterol 108 (90 Base) MCG/ACT Aero Soln inhalation aerosol Inhale 2 Puffs by mouth every 6 hours as needed for Shortness of Breath. 1 Inhaler 3   • zolpidem (AMBIEN) 10 MG Tab TK 1/2 TO 1 T PO QHS PRF SLEEP FOR 30 DAYS  2   • bisoprolol (ZEBETA) 5 MG Tab Take 0.5 Tabs by mouth every day. 45 Tab 3   • rosuvastatin (CRESTOR) 5 MG Tab Take 1 Tab by mouth every evening. (Patient taking differently: Take 10 mg by mouth every evening.) 90 Tab 3   • potassium chloride (KLOR-CON) 8 MEQ tablet Take 1 Tab by mouth every day. 90 Tab 3   • hydroCHLOROthiazide (HYDRODIURIL) 25 MG Tab Take 1 Tab by mouth every day. 30 Tab 3   • esomeprazole magnesium (NEXIUM) 40 MG Pack Take 40 mg by mouth every morning before breakfast.     • mupirocin (BACTROBAN) 2 % Ointment Apply 1 Application to affected area(s) 2 Times a Day. To affected area. (Patient not taking: Reported on 9/12/2019) 1 Tube 0   • oxybutynin SR (DITROPAN-XL) 10 MG CR tablet Take 10 mg by mouth every morning.       No current facility-administered medications for this visit.          Allergies as of 09/12/2019 - Reviewed 09/12/2019   Allergen Reaction Noted   • Codeine Vomiting 02/09/2009   • Doxycycline Nausea 10/03/2014   • Latex  09/10/2018   • Monodox Vomiting 02/09/2009   • Morphine  09/10/2018   • Tape  09/10/2018   • Vicodin [hydrocodone-acetaminophen] Vomiting 08/17/2009        ROS: Denies any chest pain, Shortness of breath, Changes bowel or bladder, Lower extremity edema.    Physical Exam:  Gen.: Well-developed, well-nourished, no apparent distress,pleasant and cooperative with the examination  Skin:  Warm and dry with good turgor. No rashes or suspicious lesions in visible areas  Eye: PERRLA, conjunctiva and sclera clear, lids normal  HEENT:  Normocephalic/atraumatic, sinuses nontender with palpation, TMs clear, nares patent with pink mucosa and clear rhinorrhea, lips without lesions, oropharynx clear.  Neck: Trachea midline,no masses or adenopathy  Thyroid: normal consistency and size. No masses or nodules. Not tender with palpation.  Cor: Regular rate and rhythm without murmur, gallop or rub.  Lungs: Respirations unlabored.Clear to auscultation with equal breath sounds bilaterally. No wheezes, rhonchi.  Abdomen: Soft nontender without hepatosplenomegaly or masses appreciated, normoactive bowel sounds. No hernias.  Extremities: No cyanosis, clubbing or edema, Symmetrical without deformities or malformations. Pulses 2+ and symmetrical both upper and lower extremities  Lymphatic: No abnormal adenopathy of the neck groin or axillae.  Psych: Alert and oriented x 3.Normal affect, judgement,insight and memory.        Assessment and Plan.   49 y.o. female here to establish care    1. Other fatigue  Will do work up to R/O DM, Anemia , Thyroid disease , Kidney disease    - CBC WITH DIFFERENTIAL; Future  - Comp Metabolic Panel; Future  - TSH; Future  - FREE THYROXINE; Future  - HEMOGLOBIN A1C; Future  - VITAMIN D,25 HYDROXY; Future  - VITAMIN B12; Future    2. Arthralgia, unspecified joint  Rule out systemic arthritis including lupus  - ANTI-NUCLEAR ANTIBODY SERUM; Future  - ANTI-DNA (DS); Future  - RHEUMATOID ARTHRITIS FACTOR; Future  - WESTERGREN SED RATE; Future    3. Bipolar 1 disorder (HCC)  Controlled continue same regimen, advised to discuss with her psychiatrist who prescribes her Ambien and Klonopin the possibility of interaction and synergistic effect of the medication which works in the same receptors.        Please note that this dictation was created using voice recognition software. I have made every reasonable attempt to correct obvious errors but there may be errors of grammar and content that I may have overlooked prior to finalization of this  note.

## 2019-09-23 RX ORDER — HYDROCHLOROTHIAZIDE 25 MG/1
TABLET ORAL
Qty: 90 TAB | Refills: 0 | Status: SHIPPED | OUTPATIENT
Start: 2019-09-23

## 2019-10-01 ENCOUNTER — OFFICE VISIT (OUTPATIENT)
Dept: MEDICAL GROUP | Facility: MEDICAL CENTER | Age: 49
End: 2019-10-01
Attending: FAMILY MEDICINE
Payer: MEDICAID

## 2019-10-01 ENCOUNTER — HOSPITAL ENCOUNTER (OUTPATIENT)
Facility: MEDICAL CENTER | Age: 49
End: 2019-10-01
Attending: FAMILY MEDICINE
Payer: MEDICAID

## 2019-10-01 VITALS
DIASTOLIC BLOOD PRESSURE: 68 MMHG | HEART RATE: 92 BPM | RESPIRATION RATE: 16 BRPM | BODY MASS INDEX: 31.28 KG/M2 | TEMPERATURE: 97.9 F | OXYGEN SATURATION: 96 % | HEIGHT: 62 IN | WEIGHT: 170 LBS | SYSTOLIC BLOOD PRESSURE: 112 MMHG

## 2019-10-01 DIAGNOSIS — N89.9 VAGINAL DISORDER: ICD-10-CM

## 2019-10-01 DIAGNOSIS — R39.15 URINARY URGENCY: ICD-10-CM

## 2019-10-01 LAB
APPEARANCE UR: CLEAR
BILIRUB UR STRIP-MCNC: NORMAL MG/DL
COLOR UR AUTO: YELLOW
GLUCOSE UR STRIP.AUTO-MCNC: NORMAL MG/DL
KETONES UR STRIP.AUTO-MCNC: NORMAL MG/DL
LEUKOCYTE ESTERASE UR QL STRIP.AUTO: NORMAL
NITRITE UR QL STRIP.AUTO: NORMAL
PH UR STRIP.AUTO: 5 [PH] (ref 5–8)
PROT UR QL STRIP: NORMAL MG/DL
RBC UR QL AUTO: NORMAL
SP GR UR STRIP.AUTO: 1.01
UROBILINOGEN UR STRIP-MCNC: 0.2 MG/DL

## 2019-10-01 PROCEDURE — 99214 OFFICE O/P EST MOD 30 MIN: CPT | Performed by: FAMILY MEDICINE

## 2019-10-01 PROCEDURE — 87205 SMEAR GRAM STAIN: CPT

## 2019-10-01 PROCEDURE — 81002 URINALYSIS NONAUTO W/O SCOPE: CPT | Performed by: FAMILY MEDICINE

## 2019-10-01 PROCEDURE — 87086 URINE CULTURE/COLONY COUNT: CPT

## 2019-10-01 PROCEDURE — 87070 CULTURE OTHR SPECIMN AEROBIC: CPT

## 2019-10-01 NOTE — PROGRESS NOTES
Chief Complaint:   Chief Complaint   Patient presents with   • Other     Abnormal vaginal smell       HPI: Established patient accompanied with her male partner  Ambreen Higuera is a 49 y.o. female who presents for evaluation of abnormal order possibly vaginal infection    Vaginal disorder  Patient reports for the past few days has been experiencing abnormal vaginal discharge order she is not sure if it is her urine or vaginal discharge, reports no change in the color or the consistency of her discharge.  Patient reports that she has been having chronic diarrhea and she is suspecting having a urinary tract infection or bacterial vaginosis.  Denies lower abdominal or pelvic pain    Urinary urgency  For the past few days has been having more urine urgency, and frequency without burning sensation.  Denies flank pain or fever or nausea vomiting      Patient is supposed to do all her labs and urine test as directed last visit on Monday    Past medical history, family history, social history and medications reviewed and updated in the record. Today   Current medications, problem list and allergies reviewed in [facility] today   Health maintenance topics are reviewed and updated.    Patient Active Problem List    Diagnosis Date Noted   • Moderate persistent asthma without complication 03/21/2017   • Depression with anxiety 02/10/2010   • PTSD (post-traumatic stress disorder) 02/10/2010   • Insomnia 08/28/2009   • Hyperlipidemia 08/28/2009     Family History   Problem Relation Age of Onset   • Cancer Father         lung   • Heart Disease Father         arrythmia    • Cancer Maternal Aunt    • Hypertension Mother    • Heart Disease Sister         svt   • Heart Attack Maternal Grandfather    • Cancer Paternal Grandmother         breast ca      Social History     Socioeconomic History   • Marital status: Single     Spouse name: Not on file   • Number of children: Not on file   • Years of education: Not on file   • Highest  education level: Not on file   Occupational History   • Not on file   Social Needs   • Financial resource strain: Not on file   • Food insecurity:     Worry: Not on file     Inability: Not on file   • Transportation needs:     Medical: Not on file     Non-medical: Not on file   Tobacco Use   • Smoking status: Current Some Day Smoker     Packs/day: 0.25     Types: Cigarettes   • Smokeless tobacco: Never Used   Substance and Sexual Activity   • Alcohol use: No   • Drug use: No   • Sexual activity: Yes     Partners: Male     Comment:    Lifestyle   • Physical activity:     Days per week: Not on file     Minutes per session: Not on file   • Stress: Not on file   Relationships   • Social connections:     Talks on phone: Not on file     Gets together: Not on file     Attends Uatsdin service: Not on file     Active member of club or organization: Not on file     Attends meetings of clubs or organizations: Not on file     Relationship status: Not on file   • Intimate partner violence:     Fear of current or ex partner: Not on file     Emotionally abused: Not on file     Physically abused: Not on file     Forced sexual activity: Not on file   Other Topics Concern   • Not on file   [facility] Narrative     [facility] [employer]        Current Outpatient Medications   Medication Sig Dispense Refill   • hydroCHLOROthiazide (HYDRODIURIL) 25 MG Tab TAKE 1 TABLET BY MOUTH EVERY DAY 90 Tab 0   • risperiDONE (RISPERDAL) 0.5 MG Tab Take 0.5 mg by mouth 2 times a day.     • cholestyramine (QUESTRAN,PREVALITE) 4 GM Pack Take  by mouth 2 times a day.     • clonazePAM (KLONOPIN) 1 MG Tab Take 0.5 mg by mouth 2 times a day.     • dicyclomine (BENTYL) 10 MG Cap Take 10 mg by mouth 4 Times a Day,Before Meals and at Bedtime.     • albuterol 108 (90 Base) MCG/ACT Aero Soln inhalation aerosol Inhale 2 Puffs by mouth every 6 hours as needed for Shortness of Breath. 1 Inhaler 3   • mupirocin (BACTROBAN) 2 % Ointment Apply 1  "Application to affected area(s) 2 Times a Day. To affected area. (Patient not taking: Reported on 9/12/2019) 1 Tube 0   • zolpidem (AMBIEN) 10 MG Tab TK 1/2 TO 1 T PO QHS PRF SLEEP FOR 30 DAYS  2   • bisoprolol (ZEBETA) 5 MG Tab Take 0.5 Tabs by mouth every day. 45 Tab 3   • rosuvastatin (CRESTOR) 5 MG Tab Take 1 Tab by mouth every evening. (Patient taking differently: Take 10 mg by mouth every evening.) 90 Tab 3   • potassium chloride (KLOR-CON) 8 MEQ tablet Take 1 Tab by mouth every day. 90 Tab 3   • esomeprazole magnesium (NEXIUM) 40 MG Pack Take 40 mg by mouth every morning before breakfast.     • oxybutynin SR (DITROPAN-XL) 10 MG CR tablet Take 10 mg by mouth every morning.       No current facility-administered medications for this visit.          Review Of Systems  As documented in HPI above  PHYSICAL EXAMINATION:    /68 (BP Location: Left arm, Patient Position: Sitting, BP Cuff Size: Adult)   Pulse 92   Temp 36.6 °C (97.9 °F) (Temporal)   Resp 16   Ht 1.575 m (5' 2.01\")   Wt 77.1 kg (170 lb)   LMP 03/08/2001   SpO2 96%   BMI 31.09 kg/m²   Gen.: Well-developed, well-nourished, no apparent distress, pleasant and cooperative with the examination  HEENT: Normocephalic/atraumatic,    Neck: No JVD or bruits, no adenopathy  Cor: Regular rate and rhythm without murmur gallop or rub  Genital exam: No abnormal discharge, no suspicious lesion or rash.  Vaginal swab collected today  Abdomen: Soft nontender without hepatosplenomegaly or masses appreciated, normoactive bowel sounds  Extremities: No cyanosis, clubbing or edema        ASSESSMENT/Plan:  1. Vaginal disorder   this is a new problem, differential diagnosis includes bacterial vaginosis.  Will wait for the results from the genital culture to rule out vaginitis, urine dip at the office without evidence of UTI will send the ransacked sample for culture.  Follow-up as directed after doing lab work for further discussion.  GENITAL CULTURE, ROUTINE   2. " Urinary urgency  URINE CULTURE(NEW)    POCT Urinalysis       Please note that this dictation was created using voice recognition software. I have made every reasonable attempt to correct obvious errors but there may be errors of grammar and content that I may have overlooked prior to finalization of this note.        normal (ped)...

## 2019-10-02 ENCOUNTER — HOSPITAL ENCOUNTER (OUTPATIENT)
Dept: LAB | Facility: MEDICAL CENTER | Age: 49
End: 2019-10-02
Attending: FAMILY MEDICINE
Payer: MEDICAID

## 2019-10-02 DIAGNOSIS — R53.83 OTHER FATIGUE: ICD-10-CM

## 2019-10-02 DIAGNOSIS — M25.50 ARTHRALGIA, UNSPECIFIED JOINT: ICD-10-CM

## 2019-10-02 LAB
25(OH)D3 SERPL-MCNC: 27 NG/ML (ref 30–100)
ALBUMIN SERPL BCP-MCNC: 4.4 G/DL (ref 3.2–4.9)
ALBUMIN/GLOB SERPL: 1.5 G/DL
ALP SERPL-CCNC: 87 U/L (ref 30–99)
ALT SERPL-CCNC: 40 U/L (ref 2–50)
ANION GAP SERPL CALC-SCNC: 14 MMOL/L (ref 0–11.9)
AST SERPL-CCNC: 24 U/L (ref 12–45)
BASOPHILS # BLD AUTO: 1.4 % (ref 0–1.8)
BASOPHILS # BLD: 0.12 K/UL (ref 0–0.12)
BILIRUB SERPL-MCNC: 0.6 MG/DL (ref 0.1–1.5)
BUN SERPL-MCNC: 12 MG/DL (ref 8–22)
CALCIUM SERPL-MCNC: 10 MG/DL (ref 8.5–10.5)
CHLORIDE SERPL-SCNC: 101 MMOL/L (ref 96–112)
CO2 SERPL-SCNC: 25 MMOL/L (ref 20–33)
CREAT SERPL-MCNC: 0.8 MG/DL (ref 0.5–1.4)
EOSINOPHIL # BLD AUTO: 0.39 K/UL (ref 0–0.51)
EOSINOPHIL NFR BLD: 4.6 % (ref 0–6.9)
ERYTHROCYTE [DISTWIDTH] IN BLOOD BY AUTOMATED COUNT: 48.3 FL (ref 35.9–50)
ERYTHROCYTE [SEDIMENTATION RATE] IN BLOOD BY WESTERGREN METHOD: 7 MM/HOUR (ref 0–30)
GLOBULIN SER CALC-MCNC: 2.9 G/DL (ref 1.9–3.5)
GLUCOSE SERPL-MCNC: 122 MG/DL (ref 65–99)
GRAM STN SPEC: NORMAL
HCT VFR BLD AUTO: 43.5 % (ref 37–47)
HGB BLD-MCNC: 14.7 G/DL (ref 12–16)
IMM GRANULOCYTES # BLD AUTO: 0.04 K/UL (ref 0–0.11)
IMM GRANULOCYTES NFR BLD AUTO: 0.5 % (ref 0–0.9)
LYMPHOCYTES # BLD AUTO: 2.42 K/UL (ref 1–4.8)
LYMPHOCYTES NFR BLD: 28.8 % (ref 22–41)
MCH RBC QN AUTO: 32 PG (ref 27–33)
MCHC RBC AUTO-ENTMCNC: 33.8 G/DL (ref 33.6–35)
MCV RBC AUTO: 94.8 FL (ref 81.4–97.8)
MONOCYTES # BLD AUTO: 0.66 K/UL (ref 0–0.85)
MONOCYTES NFR BLD AUTO: 7.9 % (ref 0–13.4)
NEUTROPHILS # BLD AUTO: 4.77 K/UL (ref 2–7.15)
NEUTROPHILS NFR BLD: 56.8 % (ref 44–72)
NRBC # BLD AUTO: 0 K/UL
NRBC BLD-RTO: 0 /100 WBC
PLATELET # BLD AUTO: 332 K/UL (ref 164–446)
PMV BLD AUTO: 9.5 FL (ref 9–12.9)
POTASSIUM SERPL-SCNC: 3.7 MMOL/L (ref 3.6–5.5)
PROT SERPL-MCNC: 7.3 G/DL (ref 6–8.2)
RBC # BLD AUTO: 4.59 M/UL (ref 4.2–5.4)
RHEUMATOID FACT SER IA-ACNC: <10 IU/ML (ref 0–14)
SIGNIFICANT IND 70042: NORMAL
SITE SITE: NORMAL
SODIUM SERPL-SCNC: 140 MMOL/L (ref 135–145)
SOURCE SOURCE: NORMAL
T4 FREE SERPL-MCNC: 0.93 NG/DL (ref 0.53–1.43)
TSH SERPL DL<=0.005 MIU/L-ACNC: 6.63 UIU/ML (ref 0.38–5.33)
VIT B12 SERPL-MCNC: 240 PG/ML (ref 211–911)
WBC # BLD AUTO: 8.4 K/UL (ref 4.8–10.8)

## 2019-10-02 PROCEDURE — 86431 RHEUMATOID FACTOR QUANT: CPT

## 2019-10-02 PROCEDURE — 36415 COLL VENOUS BLD VENIPUNCTURE: CPT

## 2019-10-02 PROCEDURE — 84443 ASSAY THYROID STIM HORMONE: CPT

## 2019-10-02 PROCEDURE — 82607 VITAMIN B-12: CPT

## 2019-10-02 PROCEDURE — 84439 ASSAY OF FREE THYROXINE: CPT

## 2019-10-02 PROCEDURE — 86038 ANTINUCLEAR ANTIBODIES: CPT

## 2019-10-02 PROCEDURE — 86225 DNA ANTIBODY NATIVE: CPT

## 2019-10-02 PROCEDURE — 83036 HEMOGLOBIN GLYCOSYLATED A1C: CPT

## 2019-10-02 PROCEDURE — 80053 COMPREHEN METABOLIC PANEL: CPT

## 2019-10-02 PROCEDURE — 82306 VITAMIN D 25 HYDROXY: CPT

## 2019-10-02 PROCEDURE — 85652 RBC SED RATE AUTOMATED: CPT

## 2019-10-02 PROCEDURE — 85025 COMPLETE CBC W/AUTO DIFF WBC: CPT

## 2019-10-03 DIAGNOSIS — B96.89 BACTERIAL VAGINOSIS: ICD-10-CM

## 2019-10-03 DIAGNOSIS — N76.0 BACTERIAL VAGINOSIS: ICD-10-CM

## 2019-10-03 LAB
EST. AVERAGE GLUCOSE BLD GHB EST-MCNC: 134 MG/DL
HBA1C MFR BLD: 6.3 % (ref 0–5.6)

## 2019-10-03 RX ORDER — METRONIDAZOLE 500 MG/1
500 TABLET ORAL 3 TIMES DAILY
Qty: 21 TAB | Refills: 0 | Status: SHIPPED | OUTPATIENT
Start: 2019-10-03 | End: 2019-10-08

## 2019-10-04 LAB
BACTERIA UR CULT: NORMAL
DSDNA AB TITR SER CLIF: NORMAL {TITER}
NUCLEAR IGG SER QL IA: NORMAL
SIGNIFICANT IND 70042: NORMAL
SITE SITE: NORMAL
SOURCE SOURCE: NORMAL

## 2019-10-05 LAB
BACTERIA GENITAL AEROBE CULT: NORMAL
GRAM STN SPEC: NORMAL
SIGNIFICANT IND 70042: NORMAL
SITE SITE: NORMAL
SOURCE SOURCE: NORMAL

## 2019-10-08 ENCOUNTER — OFFICE VISIT (OUTPATIENT)
Dept: MEDICAL GROUP | Facility: MEDICAL CENTER | Age: 49
End: 2019-10-08
Attending: FAMILY MEDICINE
Payer: MEDICAID

## 2019-10-08 VITALS
BODY MASS INDEX: 32.17 KG/M2 | HEIGHT: 62 IN | TEMPERATURE: 97.6 F | SYSTOLIC BLOOD PRESSURE: 118 MMHG | RESPIRATION RATE: 14 BRPM | DIASTOLIC BLOOD PRESSURE: 68 MMHG | WEIGHT: 174.8 LBS | HEART RATE: 77 BPM | OXYGEN SATURATION: 95 %

## 2019-10-08 DIAGNOSIS — R79.89 LOW VITAMIN D LEVEL: ICD-10-CM

## 2019-10-08 DIAGNOSIS — E03.9 HYPOTHYROIDISM, UNSPECIFIED TYPE: ICD-10-CM

## 2019-10-08 DIAGNOSIS — R73.02 IMPAIRED GLUCOSE TOLERANCE: ICD-10-CM

## 2019-10-08 PROCEDURE — 99214 OFFICE O/P EST MOD 30 MIN: CPT | Performed by: FAMILY MEDICINE

## 2019-10-08 PROCEDURE — 99213 OFFICE O/P EST LOW 20 MIN: CPT | Performed by: FAMILY MEDICINE

## 2019-10-08 RX ORDER — LEVOTHYROXINE SODIUM 0.03 MG/1
25 TABLET ORAL
Qty: 30 TAB | Refills: 6 | Status: SHIPPED | OUTPATIENT
Start: 2019-10-08 | End: 2019-11-26

## 2019-10-08 RX ORDER — ERGOCALCIFEROL 1.25 MG/1
50000 CAPSULE ORAL
Qty: 4 CAP | Refills: 1 | Status: SHIPPED | OUTPATIENT
Start: 2019-10-08

## 2019-10-08 NOTE — PROGRESS NOTES
Chief Complaint:   Chief Complaint   Patient presents with   • Results     labs    • Thyroid Problem       HPI: Established patient  Ambreen Higuera is a 49 y.o. female who presents for follow-up and review lab work results, discussed the following today:    Hypothyroidism, unspecified type  Discussed with the patient today lab work results TSH is on the high side around 6, with normal free T4.  Reports a lot of tiredness fatigue and change in mood, dry skin and hair falling    Impaired glucose tolerance  And A1c at 6.3, she said she knows what is elevating her blood sugar her diet is not very healthy    Low vitamin D level  Low levels of vitamin D subnormal level at 27 not on any vitamin D supplementation at this time    Relayed negative l cultures from her genital cultures that was done for evaluation of abnormal vaginal discharge.      Past medical history, family history, social history and medications reviewed and updated in the record.  Today  Current medications, problem list and allergies reviewed in [facility] today  Health maintenance topics are reviewed and updated.    Patient Active Problem List    Diagnosis Date Noted   • Moderate persistent asthma without complication 03/21/2017   • Depression with anxiety 02/10/2010   • PTSD (post-traumatic stress disorder) 02/10/2010   • Insomnia 08/28/2009   • Hyperlipidemia 08/28/2009     Family History   Problem Relation Age of Onset   • Cancer Father         lung   • Heart Disease Father         arrythmia    • Cancer Maternal Aunt    • Hypertension Mother    • Heart Disease Sister         svt   • Heart Attack Maternal Grandfather    • Cancer Paternal Grandmother         breast ca      Social History     Socioeconomic History   • Marital status: Single     Spouse name: Not on file   • Number of children: Not on file   • Years of education: Not on file   • Highest education level: Not on file   Occupational History   • Not on file   Social Needs   • Financial  resource strain: Not on file   • Food insecurity:     Worry: Not on file     Inability: Not on file   • Transportation needs:     Medical: Not on file     Non-medical: Not on file   Tobacco Use   • Smoking status: Current Some Day Smoker     Packs/day: 0.25     Types: Cigarettes   • Smokeless tobacco: Never Used   Substance and Sexual Activity   • Alcohol use: No   • Drug use: No   • Sexual activity: Yes     Partners: Male     Comment:    Lifestyle   • Physical activity:     Days per week: Not on file     Minutes per session: Not on file   • Stress: Not on file   Relationships   • Social connections:     Talks on phone: Not on file     Gets together: Not on file     Attends Zoroastrianism service: Not on file     Active member of club or organization: Not on file     Attends meetings of clubs or organizations: Not on file     Relationship status: Not on file   • Intimate partner violence:     Fear of current or ex partner: Not on file     Emotionally abused: Not on file     Physically abused: Not on file     Forced sexual activity: Not on file   Other Topics Concern   • Not on file   Social History Narrative     [facility] [employer]      Current Outpatient Medications   Medication Sig Dispense Refill   • levothyroxine (SYNTHROID) 25 MCG Tab Take 1 Tab by mouth Every morning on an empty stomach. 30 Tab 6   • ergocalciferol (DRISDOL) 73743 UNIT capsule Take 1 Cap by mouth every 7 days. 4 Cap 1   • hydroCHLOROthiazide (HYDRODIURIL) 25 MG Tab TAKE 1 TABLET BY MOUTH EVERY DAY 90 Tab 0   • risperiDONE (RISPERDAL) 0.5 MG Tab Take 0.5 mg by mouth 2 times a day.     • cholestyramine (QUESTRAN,PREVALITE) 4 GM Pack Take  by mouth 2 times a day.     • clonazePAM (KLONOPIN) 1 MG Tab Take 0.5 mg by mouth 2 times a day.     • dicyclomine (BENTYL) 10 MG Cap Take 10 mg by mouth 4 Times a Day,Before Meals and at Bedtime.     • albuterol 108 (90 Base) MCG/ACT Aero Soln inhalation aerosol Inhale 2 Puffs by mouth every 6  "hours as needed for Shortness of Breath. 1 Inhaler 3   • mupirocin (BACTROBAN) 2 % Ointment Apply 1 Application to affected area(s) 2 Times a Day. To affected area. (Patient not taking: Reported on 9/12/2019) 1 Tube 0   • zolpidem (AMBIEN) 10 MG Tab TK 1/2 TO 1 T PO QHS PRF SLEEP FOR 30 DAYS  2   • bisoprolol (ZEBETA) 5 MG Tab Take 0.5 Tabs by mouth every day. 45 Tab 3   • rosuvastatin (CRESTOR) 5 MG Tab Take 1 Tab by mouth every evening. (Patient taking differently: Take 10 mg by mouth every evening.) 90 Tab 3   • potassium chloride (KLOR-CON) 8 MEQ tablet Take 1 Tab by mouth every day. 90 Tab 3   • esomeprazole magnesium (NEXIUM) 40 MG Pack Take 40 mg by mouth every morning before breakfast.     • oxybutynin SR (DITROPAN-XL) 10 MG CR tablet Take 10 mg by mouth every morning.       No current facility-administered medications for this visit.            Review Of Systems  As documented in HPI above  PHYSICAL EXAMINATION:    /68 (BP Location: Left arm, Patient Position: Sitting, BP Cuff Size: Adult)   Pulse 77   Temp 36.4 °C (97.6 °F) (Temporal)   Resp 14   Ht 1.575 m (5' 2\")   Wt 79.3 kg (174 lb 12.8 oz)   LMP 03/08/2001   SpO2 95%   BMI 31.97 kg/m²   Gen.: Well-developed, well-nourished, no apparent distress, pleasant and cooperative with the examination  HEENT: Normocephalic/atraumatic,   Neck: No JVD or bruits, no adenopathy  Cor: Regular rate and rhythm without murmur gallop or rub    Extremities: No cyanosis, clubbing or edema          ASSESSMENT/Plan:  1. Hypothyroidism, unspecified type   advised to start low-dose Synthroid 25 mcg daily on empty stomach every morning and recheck thyroid function in 6 weeks, rule out thyroid autoimmune disease    levothyroxine (SYNTHROID) 25 MCG Tab    TSH    FREE THYROXINE    THYROID PEROXIDASE  (TPO) AB    ANTITHYROGLOBULIN AB    US-SOFT TISSUES OF HEAD - NECK   2. Impaired glucose tolerance   discussed importance of diet control weight loss and exercise " recheck in 3 months, A1c at this time at 6.3   3. Low vitamin D level  ergocalciferol (DRISDOL) 84189 UNIT capsule       Please note that this dictation was created using voice recognition software. I have worked with consultants from the vendor as well as technical experts from [facility] to optimize the interface. I have made every reasonable attempt to correct obvious errors, but I expect that there are errors of grammar and possibly content that I did not discover before finalizing the note.

## 2019-10-18 ENCOUNTER — HOSPITAL ENCOUNTER (OUTPATIENT)
Dept: RADIOLOGY | Facility: MEDICAL CENTER | Age: 49
End: 2019-10-18
Attending: FAMILY MEDICINE
Payer: MEDICAID

## 2019-10-21 ENCOUNTER — HOSPITAL ENCOUNTER (EMERGENCY)
Facility: MEDICAL CENTER | Age: 49
End: 2019-10-21
Attending: EMERGENCY MEDICINE
Payer: MEDICAID

## 2019-10-21 ENCOUNTER — APPOINTMENT (OUTPATIENT)
Dept: RADIOLOGY | Facility: MEDICAL CENTER | Age: 49
End: 2019-10-21
Attending: EMERGENCY MEDICINE
Payer: MEDICAID

## 2019-10-21 ENCOUNTER — TELEPHONE (OUTPATIENT)
Dept: SCHEDULING | Facility: IMAGING CENTER | Age: 49
End: 2019-10-21

## 2019-10-21 VITALS
HEART RATE: 71 BPM | OXYGEN SATURATION: 98 % | DIASTOLIC BLOOD PRESSURE: 75 MMHG | SYSTOLIC BLOOD PRESSURE: 157 MMHG | RESPIRATION RATE: 14 BRPM | HEIGHT: 61 IN | BODY MASS INDEX: 32.26 KG/M2 | TEMPERATURE: 97.2 F | WEIGHT: 170.86 LBS

## 2019-10-21 DIAGNOSIS — R10.84 GENERALIZED ABDOMINAL PAIN: ICD-10-CM

## 2019-10-21 LAB
ALBUMIN SERPL BCP-MCNC: 4.6 G/DL (ref 3.2–4.9)
ALBUMIN/GLOB SERPL: 1.9 G/DL
ALP SERPL-CCNC: 78 U/L (ref 30–99)
ALT SERPL-CCNC: 29 U/L (ref 2–50)
ANION GAP SERPL CALC-SCNC: 10 MMOL/L (ref 0–11.9)
APPEARANCE UR: CLEAR
AST SERPL-CCNC: 20 U/L (ref 12–45)
BASOPHILS # BLD AUTO: 1.6 % (ref 0–1.8)
BASOPHILS # BLD: 0.1 K/UL (ref 0–0.12)
BILIRUB SERPL-MCNC: 0.3 MG/DL (ref 0.1–1.5)
BILIRUB UR QL STRIP.AUTO: NEGATIVE
BUN SERPL-MCNC: 11 MG/DL (ref 8–22)
CALCIUM SERPL-MCNC: 9.9 MG/DL (ref 8.5–10.5)
CHLORIDE SERPL-SCNC: 104 MMOL/L (ref 96–112)
CO2 SERPL-SCNC: 25 MMOL/L (ref 20–33)
COLOR UR: YELLOW
CREAT SERPL-MCNC: 0.66 MG/DL (ref 0.5–1.4)
EOSINOPHIL # BLD AUTO: 0.33 K/UL (ref 0–0.51)
EOSINOPHIL NFR BLD: 5.2 % (ref 0–6.9)
ERYTHROCYTE [DISTWIDTH] IN BLOOD BY AUTOMATED COUNT: 50.6 FL (ref 35.9–50)
GLOBULIN SER CALC-MCNC: 2.4 G/DL (ref 1.9–3.5)
GLUCOSE SERPL-MCNC: 114 MG/DL (ref 65–99)
GLUCOSE UR STRIP.AUTO-MCNC: NEGATIVE MG/DL
HCT VFR BLD AUTO: 43 % (ref 37–47)
HGB BLD-MCNC: 14.6 G/DL (ref 12–16)
IMM GRANULOCYTES # BLD AUTO: 0.03 K/UL (ref 0–0.11)
IMM GRANULOCYTES NFR BLD AUTO: 0.5 % (ref 0–0.9)
KETONES UR STRIP.AUTO-MCNC: NEGATIVE MG/DL
LEUKOCYTE ESTERASE UR QL STRIP.AUTO: NEGATIVE
LIPASE SERPL-CCNC: 26 U/L (ref 11–82)
LYMPHOCYTES # BLD AUTO: 1.73 K/UL (ref 1–4.8)
LYMPHOCYTES NFR BLD: 27.4 % (ref 22–41)
MCH RBC QN AUTO: 32.6 PG (ref 27–33)
MCHC RBC AUTO-ENTMCNC: 34 G/DL (ref 33.6–35)
MCV RBC AUTO: 96 FL (ref 81.4–97.8)
MICRO URNS: NORMAL
MONOCYTES # BLD AUTO: 0.52 K/UL (ref 0–0.85)
MONOCYTES NFR BLD AUTO: 8.2 % (ref 0–13.4)
NEUTROPHILS # BLD AUTO: 3.6 K/UL (ref 2–7.15)
NEUTROPHILS NFR BLD: 57.1 % (ref 44–72)
NITRITE UR QL STRIP.AUTO: NEGATIVE
NRBC # BLD AUTO: 0 K/UL
NRBC BLD-RTO: 0 /100 WBC
PH UR STRIP.AUTO: 6 [PH] (ref 5–8)
PLATELET # BLD AUTO: 331 K/UL (ref 164–446)
PMV BLD AUTO: 9.2 FL (ref 9–12.9)
POTASSIUM SERPL-SCNC: 4 MMOL/L (ref 3.6–5.5)
PROT SERPL-MCNC: 7 G/DL (ref 6–8.2)
PROT UR QL STRIP: NEGATIVE MG/DL
RBC # BLD AUTO: 4.48 M/UL (ref 4.2–5.4)
RBC UR QL AUTO: NEGATIVE
SODIUM SERPL-SCNC: 139 MMOL/L (ref 135–145)
SP GR UR STRIP.AUTO: <=1.005
UROBILINOGEN UR STRIP.AUTO-MCNC: 0.2 MG/DL
WBC # BLD AUTO: 6.3 K/UL (ref 4.8–10.8)

## 2019-10-21 PROCEDURE — 80053 COMPREHEN METABOLIC PANEL: CPT

## 2019-10-21 PROCEDURE — 81003 URINALYSIS AUTO W/O SCOPE: CPT

## 2019-10-21 PROCEDURE — 85025 COMPLETE CBC W/AUTO DIFF WBC: CPT

## 2019-10-21 PROCEDURE — 74177 CT ABD & PELVIS W/CONTRAST: CPT

## 2019-10-21 PROCEDURE — 83690 ASSAY OF LIPASE: CPT

## 2019-10-21 PROCEDURE — 700117 HCHG RX CONTRAST REV CODE 255: Performed by: EMERGENCY MEDICINE

## 2019-10-21 PROCEDURE — 99285 EMERGENCY DEPT VISIT HI MDM: CPT

## 2019-10-21 RX ADMIN — IOHEXOL 100 ML: 350 INJECTION, SOLUTION INTRAVENOUS at 14:59

## 2019-10-21 ASSESSMENT — PAIN DESCRIPTION - DESCRIPTORS: DESCRIPTORS: CRAMPING

## 2019-10-21 NOTE — TELEPHONE ENCOUNTER
It looks like this patient has gone to the ER per her chart, please call and make sure that she went to the ER.  Thank you.

## 2019-10-21 NOTE — ED TRIAGE NOTES
"Chief Complaint   Patient presents with   • Abdominal Pain     Lower abdominal pain that started 5 days ago.        Patient ambulatory to triage with above complaint. States she may be having an allergic reaction to caffeine. States she has had 2 bowel movements this morning. Patient states that she has a history of IBS.     Blood Pressure: 141/75, Pulse: 74, Respiration: 14, Temperature: 36.2 °C (97.2 °F), Height: 154.9 cm (5' 1\"), Weight: 77.5 kg (170 lb 13.7 oz), BMI (Calculated): 32.28, BSA (Calculated): 1.8, Pulse Oximetry: 96 %    Placed out in lobby and educated on triage process.   "

## 2019-10-21 NOTE — DISCHARGE INSTRUCTIONS
Return to the Emergency department for recheck in 24 hours if you are still having pain.  Return sooner for worsening pain fever vomiting or other concerns.     Follow-up with your primary care doctor

## 2019-10-21 NOTE — TELEPHONE ENCOUNTER
Pt. Has severe abdominal and intestine pain, scheduling recommended ER and patient declined to go in. What would you recommend I discuss with this pt?  Thank you, please advise.

## 2019-10-21 NOTE — ED PROVIDER NOTES
ED Provider Note    Scribed for Alexey Caputo M.D. by Emili Hayes. 10/21/2019, 12:35 PM.    Primary care provider: Elly Jones M.D.  Means of arrival: Walk-In  History obtained from: Patient  History limited by: None    CHIEF COMPLAINT  Chief Complaint   Patient presents with   • Abdominal Pain     Lower abdominal pain that started 5 days ago.        HPI  Ambreen Higuera is a 49 y.o. female who presents to the Emergency Department complaining of abdominal pain.  The patient states she had pain that started about 5 days ago.  Is located in her lower abdomen and both lower quadrants.  Pain is described as mild to moderate in severity initially started after having some caffeine and soda.  She has some diarrhea and some mucousy stools.  She had run to the bathroom frequently.  Pain seemed to decrease after that.  She went to see her surgeon who she had a previous relationship with any start on cholestyramine.  She has not had any improvement.  She denies any vomiting although she has had some nausea.  She has chronic dysuria but she states she chronic interstitial cystitis and this does not feel particularly different.  Denies any vaginal bleeding.  Has had a hysterectomy.  Had prior cholecystectomy as well.  Has a history of inflammatory bowel disease.  No history of diverticulitis or colitis.  No recent antibiotic use sick contacts or foreign travel.  No other aggravating leaving factors or associated complaint.    REVIEW OF SYSTEMS  Review of Systems   All other systems reviewed and are negative.      PAST MEDICAL HISTORY   has a past medical history of Anesthesia, Arthritis, ASTHMA (8/28/2009), Bipolar disorder (HCC), Cancer (HCC) (2002), Cataract, Depression with anxiety (2/10/2010), Heart burn, Hepatitis C (2011), High cholesterol, Hypertension, Indigestion, Other specified symptom associated with female genital organs, Pain, Pre-diabetes, Psychiatric disorder, and Urinary bladder  disorder.    SURGICAL HISTORY   has a past surgical history that includes other orthopedic surgery; gyn surgery; gyn surgery; inj,epidural,cerv/thor single (10/22/2014); us-needle core bx-breast panel; cholecystectomy; lesion excision general (Left, 9/14/2018); abdominal hysterectomy total; breast biopsy (Left, 6/15/2018); breast biopsy (Left, 9/14/2018); and tubal coagulation laparoscopic bilateral.    SOCIAL HISTORY  Social History     Tobacco Use   • Smoking status: Former Smoker     Packs/day: 0.25     Types: Cigarettes   • Smokeless tobacco: Never Used   Substance Use Topics   • Alcohol use: No   • Drug use: No      Social History     Substance and Sexual Activity   Drug Use No       FAMILY HISTORY  Family History   Problem Relation Age of Onset   • Cancer Father         lung   • Heart Disease Father         arrythmia    • Cancer Maternal Aunt    • Hypertension Mother    • Heart Disease Sister         svt   • Heart Attack Maternal Grandfather    • Cancer Paternal Grandmother         breast ca        CURRENT MEDICATIONS  Home Medications     Reviewed by Randi Fish R.N. (Registered Nurse) on 10/21/19 at 1203  Med List Status: Partial   Medication Last Dose Status   albuterol 108 (90 Base) MCG/ACT Aero Soln inhalation aerosol prn Active   bisoprolol (ZEBETA) 5 MG Tab 10/21/2019 Active   cholestyramine (QUESTRAN,PREVALITE) 4 GM Pack 10/20/2019 Active   clonazePAM (KLONOPIN) 1 MG Tab not taking Active   dicyclomine (BENTYL) 10 MG Cap 10/21/2019 Active   ergocalciferol (DRISDOL) 42423 UNIT capsule  Active   esomeprazole magnesium (NEXIUM) 40 MG Pack not taking Active   hydroCHLOROthiazide (HYDRODIURIL) 25 MG Tab not taking Active   levothyroxine (SYNTHROID) 25 MCG Tab 10/21/2019 Active   mupirocin (BACTROBAN) 2 % Ointment not taking Active   oxybutynin SR (DITROPAN-XL) 10 MG CR tablet not taking Active   potassium chloride (KLOR-CON) 8 MEQ tablet not taking Active   risperiDONE (RISPERDAL) 0.5 MG Tab  "10/20/2019 Active   rosuvastatin (CRESTOR) 5 MG Tab not taking Active   zolpidem (AMBIEN) 10 MG Tab 10/20/2019 Active                ALLERGIES  Allergies   Allergen Reactions   • Codeine Vomiting   • Doxycycline Nausea   • Latex      Rash    • Monodox Vomiting   • Morphine      Hallucinations    • Tape      Adhesive tape-rash. Paper tape okay.    • Vicodin [Hydrocodone-Acetaminophen] Vomiting       PHYSICAL EXAM  VITAL SIGNS: /75   Pulse 74   Temp 36.2 °C (97.2 °F) (Temporal)   Resp 14   Ht 1.549 m (5' 1\")   Wt 77.5 kg (170 lb 13.7 oz)   LMP 03/08/2001   SpO2 96%   BMI 32.28 kg/m²   Vitals reviewed.  Constitutional: Well developed, Well nourished, No acute distress, Non-toxic appearance.   HENT: Normocephalic, Atraumatic, Bilateral external ears normal, Oropharynx moist, No oral exudates, Nose normal.   Eyes: PERRL, EOMI, Conjunctiva normal, No discharge.   Neck: Normal range of motion, No tenderness, Supple, No stridor.   Cardiovascular: Normal heart rate, Normal rhythm, No murmurs, No rubs, No gallops.   Thorax & Lungs: Normal breath sounds, No respiratory distress, No wheezing,  Abdomen: Bowel sounds normal, Soft, tender in both lower quadrants no peritonitis  Skin: Warm, Dry, No erythema, No rash.   Back: No tenderness, No CVA tenderness.   Musculoskeletal: Good range of motion in all major joints.  Neurologic: Alert, No focal deficits noted.   Psychiatric: Affect normal    Results for orders placed or performed during the hospital encounter of 10/21/19   CBC WITH DIFFERENTIAL   Result Value Ref Range    WBC 6.3 4.8 - 10.8 K/uL    RBC 4.48 4.20 - 5.40 M/uL    Hemoglobin 14.6 12.0 - 16.0 g/dL    Hematocrit 43.0 37.0 - 47.0 %    MCV 96.0 81.4 - 97.8 fL    MCH 32.6 27.0 - 33.0 pg    MCHC 34.0 33.6 - 35.0 g/dL    RDW 50.6 (H) 35.9 - 50.0 fL    Platelet Count 331 164 - 446 K/uL    MPV 9.2 9.0 - 12.9 fL    Neutrophils-Polys 57.10 44.00 - 72.00 %    Lymphocytes 27.40 22.00 - 41.00 %    Monocytes 8.20 0.00 " - 13.40 %    Eosinophils 5.20 0.00 - 6.90 %    Basophils 1.60 0.00 - 1.80 %    Immature Granulocytes 0.50 0.00 - 0.90 %    Nucleated RBC 0.00 /100 WBC    Neutrophils (Absolute) 3.60 2.00 - 7.15 K/uL    Lymphs (Absolute) 1.73 1.00 - 4.80 K/uL    Monos (Absolute) 0.52 0.00 - 0.85 K/uL    Eos (Absolute) 0.33 0.00 - 0.51 K/uL    Baso (Absolute) 0.10 0.00 - 0.12 K/uL    Immature Granulocytes (abs) 0.03 0.00 - 0.11 K/uL    NRBC (Absolute) 0.00 K/uL   COMP METABOLIC PANEL   Result Value Ref Range    Sodium 139 135 - 145 mmol/L    Potassium 4.0 3.6 - 5.5 mmol/L    Chloride 104 96 - 112 mmol/L    Co2 25 20 - 33 mmol/L    Anion Gap 10.0 0.0 - 11.9    Glucose 114 (H) 65 - 99 mg/dL    Bun 11 8 - 22 mg/dL    Creatinine 0.66 0.50 - 1.40 mg/dL    Calcium 9.9 8.5 - 10.5 mg/dL    AST(SGOT) 20 12 - 45 U/L    ALT(SGPT) 29 2 - 50 U/L    Alkaline Phosphatase 78 30 - 99 U/L    Total Bilirubin 0.3 0.1 - 1.5 mg/dL    Albumin 4.6 3.2 - 4.9 g/dL    Total Protein 7.0 6.0 - 8.2 g/dL    Globulin 2.4 1.9 - 3.5 g/dL    A-G Ratio 1.9 g/dL   URINALYSIS,CULTURE IF INDICATED   Result Value Ref Range    Color Yellow     Character Clear     Specific Gravity <=1.005 <1.035    Ph 6.0 5.0 - 8.0    Glucose Negative Negative mg/dL    Ketones Negative Negative mg/dL    Protein Negative Negative mg/dL    Bilirubin Negative Negative    Urobilinogen, Urine 0.2 Negative    Nitrite Negative Negative    Leukocyte Esterase Negative Negative    Occult Blood Negative Negative    Micro Urine Req see below    ESTIMATED GFR   Result Value Ref Range    GFR If African American >60 >60 mL/min/1.73 m 2    GFR If Non African American >60 >60 mL/min/1.73 m 2      All labs reviewed by me.    RADIOLOGY  CT-ABDOMEN-PELVIS WITH   Final Result      1.  There is no acute inflammatory process within the abdomen or pelvis.   2.  There is hepatic steatosis.        The radiologist's interpretation of all radiological studies have been reviewed by me.    COURSE & MEDICAL DECISION  MAKING  Pertinent Labs & Imaging studies reviewed. (See chart for details)        12:35 PM Patient seen and examined at bedside. The patient presents with acute lower abdominal pain., and the differential diagnosis includes but is not limited to colitis, diverticulitis, inflammatory bowel disease, less likely appendicitis.  Also ordered a UTI.  PID is unlikely considering she had a hysterectomy including removal of the cervix.       Patient is worked up with labs and CT exclude the above differential diagnoses.  The patient does not have a clinical history or exam shows of appendicitis.  CBC CMP are unremarkable.  Gallbladder surgically absent.  There is no signs of pancreatitis or hepatitis.  Urinalysis also does not appear to show any evidence of infection.    CT with IV contrast was obtained that shows no acute abnormality.    The patient's vital signs are reassuring she is tolerating food and fluids.  Abdominal exam is benign with minimal diffuse tenderness.  Certainly could be from irritable bowel syndrome.  She should follow-up with her primary care doctor.  There is no evidence of colitis or diverticulitis.  Nothing was put on antibiotics.    At this point he the remainder work-up can be performed as an outpatient.  She is discharged home to return if she has more pain fever vomiting or other concerns.  Questions answered.  Is agreeable to plan she is discharged in good condition.                  Elly Jones M.D.  [address] [place] [id number]  [phone]    Schedule an appointment as soon as possible for a visit in 1 day          FINAL IMPRESSION  1. Generalized abdominal pain          Emili HANEY), am scribing for, and in the presence of, Alexey Caputo M.D..    Electronically signed by: Emili Tello), 10/21/2019    Alexey HANEY M.D. personally performed the services described in this documentation, as scribed by Emili Hayes in my presence, and it  is both accurate and complete.    The note accurately reflects work and decisions made by me.  Alexey Caputo  10/21/2019  8:26 PM

## 2019-10-22 NOTE — TELEPHONE ENCOUNTER
Phone Number Called: [phone] (home)       Call outcome: left message for patient to call back regarding message below    Message: 2nd attempt.

## 2019-10-26 ENCOUNTER — HOSPITAL ENCOUNTER (OUTPATIENT)
Dept: RADIOLOGY | Facility: MEDICAL CENTER | Age: 49
End: 2019-10-26
Attending: FAMILY MEDICINE
Payer: MEDICAID

## 2019-11-02 ENCOUNTER — HOSPITAL ENCOUNTER (OUTPATIENT)
Dept: RADIOLOGY | Facility: MEDICAL CENTER | Age: 49
End: 2019-11-02
Attending: FAMILY MEDICINE
Payer: MEDICAID

## 2019-11-02 DIAGNOSIS — E03.9 HYPOTHYROIDISM, UNSPECIFIED TYPE: ICD-10-CM

## 2019-11-20 ENCOUNTER — HOSPITAL ENCOUNTER (OUTPATIENT)
Dept: LAB | Facility: MEDICAL CENTER | Age: 49
End: 2019-11-20
Attending: FAMILY MEDICINE
Payer: MEDICAID

## 2019-11-20 DIAGNOSIS — E03.9 HYPOTHYROIDISM, UNSPECIFIED TYPE: ICD-10-CM

## 2019-11-20 LAB
T4 FREE SERPL-MCNC: 0.75 NG/DL (ref 0.53–1.43)
THYROPEROXIDASE AB SERPL-ACNC: 0.3 IU/ML (ref 0–9)
TSH SERPL DL<=0.005 MIU/L-ACNC: 1.92 UIU/ML (ref 0.38–5.33)

## 2019-11-20 PROCEDURE — 84439 ASSAY OF FREE THYROXINE: CPT

## 2019-11-20 PROCEDURE — 84443 ASSAY THYROID STIM HORMONE: CPT

## 2019-11-20 PROCEDURE — 86800 THYROGLOBULIN ANTIBODY: CPT

## 2019-11-20 PROCEDURE — 36415 COLL VENOUS BLD VENIPUNCTURE: CPT

## 2019-11-20 PROCEDURE — 86376 MICROSOMAL ANTIBODY EACH: CPT

## 2019-11-22 ENCOUNTER — HOSPITAL ENCOUNTER (OUTPATIENT)
Dept: RADIOLOGY | Facility: MEDICAL CENTER | Age: 49
End: 2019-11-22
Attending: FAMILY MEDICINE
Payer: MEDICAID

## 2019-11-22 LAB — THYROGLOB AB SERPL-ACNC: <0.9 IU/ML (ref 0–4)

## 2019-11-22 PROCEDURE — 76536 US EXAM OF HEAD AND NECK: CPT

## 2019-11-26 ENCOUNTER — OFFICE VISIT (OUTPATIENT)
Dept: MEDICAL GROUP | Facility: MEDICAL CENTER | Age: 49
End: 2019-11-26
Attending: FAMILY MEDICINE
Payer: MEDICAID

## 2019-11-26 VITALS
BODY MASS INDEX: 31.73 KG/M2 | HEIGHT: 62 IN | SYSTOLIC BLOOD PRESSURE: 118 MMHG | HEART RATE: 75 BPM | DIASTOLIC BLOOD PRESSURE: 60 MMHG | WEIGHT: 172.4 LBS | OXYGEN SATURATION: 97 % | TEMPERATURE: 96.6 F | RESPIRATION RATE: 16 BRPM

## 2019-11-26 DIAGNOSIS — E73.9 LACTOSE INTOLERANCE: ICD-10-CM

## 2019-11-26 DIAGNOSIS — E03.9 HYPOTHYROIDISM, UNSPECIFIED TYPE: ICD-10-CM

## 2019-11-26 PROCEDURE — 99213 OFFICE O/P EST LOW 20 MIN: CPT | Performed by: FAMILY MEDICINE

## 2019-11-26 RX ORDER — LEVOTHYROXINE SODIUM 0.05 MG/1
50 TABLET ORAL
Qty: 90 TAB | Refills: 0 | Status: SHIPPED | OUTPATIENT
Start: 2019-11-26 | End: 2020-03-06 | Stop reason: SDUPTHER

## 2019-11-26 RX ORDER — LEVOTHYROXINE SODIUM 0.05 MG/1
50 TABLET ORAL
Qty: 90 TAB | Refills: 0 | Status: SHIPPED | OUTPATIENT
Start: 2019-11-26 | End: 2019-11-26 | Stop reason: SDUPTHER

## 2019-11-26 NOTE — PROGRESS NOTES
Chief Complaint:   Chief Complaint   Patient presents with   • Lab Results   • Hypothyroidism       HPI: Established patient  Ambreen Higuera is a 49 y.o. female who presents for follow-up on labs and hypothyroid discussed the following today:    Hypothyroidism, unspecified type  Patient said on Synthroid 25 mcg she feels much better her mind is more clear her fatigue and tiredness is better and she is sleeping better but she still sometimes feels kind of tired and she would like the dose to be increased, thyroid function within normal limits at this time TSH at 1.9.    Lactose intolerance  Patient reports that she noticed that she is severely lactose intolerant, whenever she eats any type of lactose ingredient she was started having abdominal pain and diarrhea and discomfort.  Was seen recently at the emergency room for lower abdominal pain CAT scan did not show significant findings.  She said since she started eliminating lactose products from her diet she is feeling much better and she is not having abdominal pain or diarrhea or any stomach upset.          Past medical history, family history, social history and medications reviewed and updated in the record.  Today  Current medications, problem list and allergies reviewed in [facility] today  Health maintenance topics are reviewed and updated.    Patient Active Problem List    Diagnosis Date Noted   • Moderate persistent asthma without complication 03/21/2017   • Depression with anxiety 02/10/2010   • PTSD (post-traumatic stress disorder) 02/10/2010   • Insomnia 08/28/2009   • Hyperlipidemia 08/28/2009     Family History   Problem Relation Age of Onset   • Cancer Father         lung   • Heart Disease Father         arrythmia    • Cancer Maternal Aunt    • Hypertension Mother    • Heart Disease Sister         svt   • Heart Attack Maternal Grandfather    • Cancer Paternal Grandmother         breast ca      Social History     Socioeconomic History   • Marital  status: Single     Spouse name: Not on file   • Number of children: Not on file   • Years of education: Not on file   • Highest education level: Not on file   Occupational History   • Not on file   Social Needs   • Financial resource strain: Not on file   • Food insecurity:     Worry: Not on file     Inability: Not on file   • Transportation needs:     Medical: Not on file     Non-medical: Not on file   Tobacco Use   • Smoking status: Former Smoker     Packs/day: 0.25     Types: Cigarettes   • Smokeless tobacco: Never Used   Substance and Sexual Activity   • Alcohol use: No   • Drug use: No   • Sexual activity: Yes     Partners: Male     Comment:    Lifestyle   • Physical activity:     Days per week: Not on file     Minutes per session: Not on file   • Stress: Not on file   Relationships   • Social connections:     Talks on phone: Not on file     Gets together: Not on file     Attends [employer] service: Not on file     Active member of club or organization: Not on file     Attends meetings of clubs or organizations: Not on file     Relationship status: Not on file   • Intimate partner violence:     Fear of current or ex partner: Not on file     Emotionally abused: Not on file     Physically abused: Not on file     Forced sexual activity: Not on file   Other Topics Concern   • Not on file   Social History Narrative     at [employer].      Current Outpatient Medications   Medication Sig Dispense Refill   • levothyroxine (SYNTHROID) 50 MCG Tab Take 1 Tab by mouth Every morning on an empty stomach. 90 Tab 0   • clonazePAM (KLONOPIN) 1 MG Tab Take 1 Tab by mouth 1 time daily as needed (anxiety) for up to 30 days. 30 Tab 0   • ergocalciferol (DRISDOL) 51831 UNIT capsule Take 1 Cap by mouth every 7 days. 4 Cap 1   • hydroCHLOROthiazide (HYDRODIURIL) 25 MG Tab TAKE 1 TABLET BY MOUTH EVERY DAY 90 Tab 0   • risperiDONE (RISPERDAL) 0.5 MG Tab Take 0.5 mg by mouth 2 times a day.     • cholestyramine  "(QUESTRAN,PREVALITE) 4 GM Pack Take  by mouth 2 times a day.     • dicyclomine (BENTYL) 10 MG Cap Take 10 mg by mouth 4 Times a Day,Before Meals and at Bedtime.     • albuterol 108 (90 Base) MCG/ACT Aero Soln inhalation aerosol Inhale 2 Puffs by mouth every 6 hours as needed for Shortness of Breath. 1 Inhaler 3   • mupirocin (BACTROBAN) 2 % Ointment Apply 1 Application to affected area(s) 2 Times a Day. To affected area. (Patient not taking: Reported on 9/12/2019) 1 Tube 0   • zolpidem (AMBIEN) 10 MG Tab TK 1/2 TO 1 T PO QHS PRF SLEEP FOR 30 DAYS  2   • bisoprolol (ZEBETA) 5 MG Tab Take 0.5 Tabs by mouth every day. 45 Tab 3   • rosuvastatin (CRESTOR) 5 MG Tab Take 1 Tab by mouth every evening. (Patient taking differently: Take 10 mg by mouth every evening.) 90 Tab 3   • potassium chloride (KLOR-CON) 8 MEQ tablet Take 1 Tab by mouth every day. 90 Tab 3   • esomeprazole magnesium (NEXIUM) 40 MG Pack Take 40 mg by mouth every morning before breakfast.     • oxybutynin SR (DITROPAN-XL) 10 MG CR tablet Take 10 mg by mouth every morning.       No current facility-administered medications for this visit.            Review Of Systems  As documented in HPI above  PHYSICAL EXAMINATION:    /60 (BP Location: Left arm, Patient Position: Sitting, BP Cuff Size: Adult)   Pulse 75   Temp 35.9 °C (96.6 °F) (Temporal)   Resp 16   Ht 1.575 m (5' 2\")   Wt 78.2 kg (172 lb 6.4 oz)   LMP 03/08/2001   SpO2 97%   BMI 31.53 kg/m²   Gen.: Well-developed, well-nourished, no apparent distress, pleasant and cooperative with the examination  HEENT: Normocephalic/atraumatic,Neck: No JVD or bruits, no adenopathy  Cor: Regular rate and rhythm without murmur gallop or rub    Extremities: No cyanosis, clubbing or edema          ASSESSMENT/Plan:  1. Hypothyroidism, unspecified type   will increase dose of Synthroid to 50 mcg daily, advised to recheck levels in 2 months.  Patient told me today that she has insurance changes and she will " have to follow-up with a new primary provider.  Dose is increased because of symptoms she still complaining of fatigue tiredness and low energy sometimes.  Her TSH around 1  We will try to keep  levothyroxine (SYNTHROID) 50 MCG Tab    DISCONTINUED: levothyroxine (SYNTHROID) 50 MCG Tab   2. Lactose intolerance  Continue , avoiding Lactose products , no symptoms today        Please note that this dictation was created using voice recognition software. I have made every reasonable attempt to correct obvious errors but there may be errors of grammar and content that I may have overlooked prior to finalization of this note.

## 2020-03-06 DIAGNOSIS — E03.9 HYPOTHYROIDISM, UNSPECIFIED TYPE: ICD-10-CM

## 2020-03-06 RX ORDER — LEVOTHYROXINE SODIUM 0.05 MG/1
50 TABLET ORAL
Qty: 90 TAB | Refills: 3 | Status: SHIPPED | OUTPATIENT
Start: 2020-03-06

## 2020-09-15 ENCOUNTER — PATIENT OUTREACH (OUTPATIENT)
Dept: HEALTH INFORMATION MANAGEMENT | Facility: OTHER | Age: 50
End: 2020-09-15

## 2023-01-07 ENCOUNTER — HOSPITAL ENCOUNTER (OUTPATIENT)
Dept: LAB | Facility: MEDICAL CENTER | Age: 53
End: 2023-01-07
Attending: STUDENT IN AN ORGANIZED HEALTH CARE EDUCATION/TRAINING PROGRAM
Payer: COMMERCIAL

## 2023-01-07 LAB
ALBUMIN SERPL BCP-MCNC: 4.3 G/DL (ref 3.2–4.9)
ALBUMIN/GLOB SERPL: 1.8 G/DL
ALP SERPL-CCNC: 69 U/L (ref 30–99)
ALT SERPL-CCNC: 16 U/L (ref 2–50)
ANION GAP SERPL CALC-SCNC: 11 MMOL/L (ref 7–16)
AST SERPL-CCNC: 14 U/L (ref 12–45)
BILIRUB SERPL-MCNC: 0.2 MG/DL (ref 0.1–1.5)
BUN SERPL-MCNC: 13 MG/DL (ref 8–22)
CALCIUM ALBUM COR SERPL-MCNC: 9 MG/DL (ref 8.5–10.5)
CALCIUM SERPL-MCNC: 9.2 MG/DL (ref 8.5–10.5)
CHLORIDE SERPL-SCNC: 107 MMOL/L (ref 96–112)
CHOLEST SERPL-MCNC: 135 MG/DL (ref 100–199)
CO2 SERPL-SCNC: 24 MMOL/L (ref 20–33)
CREAT SERPL-MCNC: 0.84 MG/DL (ref 0.5–1.4)
EST. AVERAGE GLUCOSE BLD GHB EST-MCNC: 120 MG/DL
FASTING STATUS PATIENT QL REPORTED: NORMAL
GFR SERPLBLD CREATININE-BSD FMLA CKD-EPI: 83 ML/MIN/1.73 M 2
GLOBULIN SER CALC-MCNC: 2.4 G/DL (ref 1.9–3.5)
GLUCOSE SERPL-MCNC: 110 MG/DL (ref 65–99)
HBA1C MFR BLD: 5.8 % (ref 4–5.6)
HDLC SERPL-MCNC: 39 MG/DL
LDLC SERPL CALC-MCNC: 75 MG/DL
POTASSIUM SERPL-SCNC: 4.5 MMOL/L (ref 3.6–5.5)
PROT SERPL-MCNC: 6.7 G/DL (ref 6–8.2)
SODIUM SERPL-SCNC: 142 MMOL/L (ref 135–145)
TRIGL SERPL-MCNC: 106 MG/DL (ref 0–149)

## 2023-01-07 PROCEDURE — 80061 LIPID PANEL: CPT

## 2023-01-07 PROCEDURE — 80053 COMPREHEN METABOLIC PANEL: CPT

## 2023-01-07 PROCEDURE — 83036 HEMOGLOBIN GLYCOSYLATED A1C: CPT

## 2023-01-07 PROCEDURE — 36415 COLL VENOUS BLD VENIPUNCTURE: CPT

## 2023-08-24 ENCOUNTER — PATIENT MESSAGE (OUTPATIENT)
Dept: HEALTH INFORMATION MANAGEMENT | Facility: OTHER | Age: 53
End: 2023-08-24

## 2023-09-30 ENCOUNTER — HOSPITAL ENCOUNTER (OUTPATIENT)
Dept: LAB | Facility: MEDICAL CENTER | Age: 53
End: 2023-09-30
Attending: STUDENT IN AN ORGANIZED HEALTH CARE EDUCATION/TRAINING PROGRAM
Payer: COMMERCIAL

## 2023-09-30 LAB
ALBUMIN SERPL BCP-MCNC: 4.3 G/DL (ref 3.2–4.9)
ALBUMIN/GLOB SERPL: 2 G/DL
ALP SERPL-CCNC: 64 U/L (ref 30–99)
ALT SERPL-CCNC: 16 U/L (ref 2–50)
ANION GAP SERPL CALC-SCNC: 9 MMOL/L (ref 7–16)
AST SERPL-CCNC: 13 U/L (ref 12–45)
BILIRUB SERPL-MCNC: 0.2 MG/DL (ref 0.1–1.5)
BUN SERPL-MCNC: 13 MG/DL (ref 8–22)
CALCIUM ALBUM COR SERPL-MCNC: 9.4 MG/DL (ref 8.5–10.5)
CALCIUM SERPL-MCNC: 9.6 MG/DL (ref 8.5–10.5)
CHLORIDE SERPL-SCNC: 103 MMOL/L (ref 96–112)
CHOLEST SERPL-MCNC: 120 MG/DL (ref 100–199)
CO2 SERPL-SCNC: 26 MMOL/L (ref 20–33)
CREAT SERPL-MCNC: 0.72 MG/DL (ref 0.5–1.4)
EST. AVERAGE GLUCOSE BLD GHB EST-MCNC: 120 MG/DL
FASTING STATUS PATIENT QL REPORTED: NORMAL
GFR SERPLBLD CREATININE-BSD FMLA CKD-EPI: 99 ML/MIN/1.73 M 2
GLOBULIN SER CALC-MCNC: 2.2 G/DL (ref 1.9–3.5)
GLUCOSE SERPL-MCNC: 105 MG/DL (ref 65–99)
HBA1C MFR BLD: 5.8 % (ref 4–5.6)
HDLC SERPL-MCNC: 33 MG/DL
LDLC SERPL CALC-MCNC: 69 MG/DL
POTASSIUM SERPL-SCNC: 4.5 MMOL/L (ref 3.6–5.5)
PROT SERPL-MCNC: 6.5 G/DL (ref 6–8.2)
SODIUM SERPL-SCNC: 138 MMOL/L (ref 135–145)
T4 FREE SERPL-MCNC: 1.22 NG/DL (ref 0.93–1.7)
TRIGL SERPL-MCNC: 90 MG/DL (ref 0–149)
TSH SERPL DL<=0.005 MIU/L-ACNC: 0.82 UIU/ML (ref 0.38–5.33)

## 2023-09-30 PROCEDURE — 36415 COLL VENOUS BLD VENIPUNCTURE: CPT

## 2023-09-30 PROCEDURE — 84439 ASSAY OF FREE THYROXINE: CPT

## 2023-09-30 PROCEDURE — 84443 ASSAY THYROID STIM HORMONE: CPT

## 2023-09-30 PROCEDURE — 80061 LIPID PANEL: CPT

## 2023-09-30 PROCEDURE — 80053 COMPREHEN METABOLIC PANEL: CPT

## 2023-09-30 PROCEDURE — 83036 HEMOGLOBIN GLYCOSYLATED A1C: CPT

## 2024-05-14 ENCOUNTER — HOSPITAL ENCOUNTER (OUTPATIENT)
Dept: LAB | Facility: MEDICAL CENTER | Age: 54
End: 2024-05-14
Attending: STUDENT IN AN ORGANIZED HEALTH CARE EDUCATION/TRAINING PROGRAM
Payer: COMMERCIAL

## 2024-05-14 LAB
ALBUMIN SERPL BCP-MCNC: 4.5 G/DL (ref 3.2–4.9)
ALBUMIN/GLOB SERPL: 2.1 G/DL
ALP SERPL-CCNC: 69 U/L (ref 30–99)
ALT SERPL-CCNC: 12 U/L (ref 2–50)
ANION GAP SERPL CALC-SCNC: 13 MMOL/L (ref 7–16)
AST SERPL-CCNC: 14 U/L (ref 12–45)
BILIRUB SERPL-MCNC: 0.3 MG/DL (ref 0.1–1.5)
BUN SERPL-MCNC: 10 MG/DL (ref 8–22)
CALCIUM ALBUM COR SERPL-MCNC: 8.9 MG/DL (ref 8.5–10.5)
CALCIUM SERPL-MCNC: 9.3 MG/DL (ref 8.5–10.5)
CHLORIDE SERPL-SCNC: 105 MMOL/L (ref 96–112)
CHOLEST SERPL-MCNC: 140 MG/DL (ref 100–199)
CO2 SERPL-SCNC: 25 MMOL/L (ref 20–33)
CREAT SERPL-MCNC: 0.76 MG/DL (ref 0.5–1.4)
EST. AVERAGE GLUCOSE BLD GHB EST-MCNC: 117 MG/DL
FASTING STATUS PATIENT QL REPORTED: NORMAL
GFR SERPLBLD CREATININE-BSD FMLA CKD-EPI: 92 ML/MIN/1.73 M 2
GLOBULIN SER CALC-MCNC: 2.1 G/DL (ref 1.9–3.5)
GLUCOSE SERPL-MCNC: 116 MG/DL (ref 65–99)
HBA1C MFR BLD: 5.7 % (ref 4–5.6)
HDLC SERPL-MCNC: 43 MG/DL
LDLC SERPL CALC-MCNC: 79 MG/DL
POTASSIUM SERPL-SCNC: 4.5 MMOL/L (ref 3.6–5.5)
PROT SERPL-MCNC: 6.6 G/DL (ref 6–8.2)
SODIUM SERPL-SCNC: 143 MMOL/L (ref 135–145)
TRIGL SERPL-MCNC: 88 MG/DL (ref 0–149)

## 2024-06-08 ENCOUNTER — HOSPITAL ENCOUNTER (OUTPATIENT)
Dept: LAB | Facility: MEDICAL CENTER | Age: 54
End: 2024-06-08
Attending: STUDENT IN AN ORGANIZED HEALTH CARE EDUCATION/TRAINING PROGRAM
Payer: COMMERCIAL

## 2024-06-08 LAB — 25(OH)D3 SERPL-MCNC: 24 NG/ML (ref 30–100)

## 2024-06-08 PROCEDURE — 82306 VITAMIN D 25 HYDROXY: CPT

## 2024-06-08 PROCEDURE — 36415 COLL VENOUS BLD VENIPUNCTURE: CPT

## 2024-06-11 ENCOUNTER — APPOINTMENT (OUTPATIENT)
Dept: URGENT CARE | Facility: PHYSICIAN GROUP | Age: 54
End: 2024-06-11
Payer: COMMERCIAL

## 2024-06-19 ENCOUNTER — HOSPITAL ENCOUNTER (OUTPATIENT)
Dept: LAB | Facility: MEDICAL CENTER | Age: 54
End: 2024-06-19
Attending: STUDENT IN AN ORGANIZED HEALTH CARE EDUCATION/TRAINING PROGRAM
Payer: COMMERCIAL

## 2024-06-19 LAB
ALBUMIN SERPL BCP-MCNC: 4.2 G/DL (ref 3.2–4.9)
ALBUMIN/GLOB SERPL: 1.8 G/DL
ALP SERPL-CCNC: 69 U/L (ref 30–99)
ALT SERPL-CCNC: 20 U/L (ref 2–50)
ANION GAP SERPL CALC-SCNC: 10 MMOL/L (ref 7–16)
AST SERPL-CCNC: 18 U/L (ref 12–45)
BASOPHILS # BLD AUTO: 1.1 % (ref 0–1.8)
BASOPHILS # BLD: 0.07 K/UL (ref 0–0.12)
BILIRUB SERPL-MCNC: 0.4 MG/DL (ref 0.1–1.5)
BUN SERPL-MCNC: 11 MG/DL (ref 8–22)
CALCIUM ALBUM COR SERPL-MCNC: 9.3 MG/DL (ref 8.5–10.5)
CALCIUM SERPL-MCNC: 9.5 MG/DL (ref 8.5–10.5)
CHLORIDE SERPL-SCNC: 104 MMOL/L (ref 96–112)
CO2 SERPL-SCNC: 26 MMOL/L (ref 20–33)
CREAT SERPL-MCNC: 0.68 MG/DL (ref 0.5–1.4)
CRP SERPL HS-MCNC: <0.3 MG/DL (ref 0–0.75)
EOSINOPHIL # BLD AUTO: 0.6 K/UL (ref 0–0.51)
EOSINOPHIL NFR BLD: 9.3 % (ref 0–6.9)
ERYTHROCYTE [DISTWIDTH] IN BLOOD BY AUTOMATED COUNT: 44.7 FL (ref 35.9–50)
ERYTHROCYTE [SEDIMENTATION RATE] IN BLOOD BY WESTERGREN METHOD: 9 MM/HOUR (ref 0–25)
FERRITIN SERPL-MCNC: 299 NG/ML (ref 10–291)
GFR SERPLBLD CREATININE-BSD FMLA CKD-EPI: 103 ML/MIN/1.73 M 2
GLOBULIN SER CALC-MCNC: 2.4 G/DL (ref 1.9–3.5)
GLUCOSE SERPL-MCNC: 116 MG/DL (ref 65–99)
HCT VFR BLD AUTO: 41.8 % (ref 37–47)
HGB BLD-MCNC: 14.1 G/DL (ref 12–16)
IMM GRANULOCYTES # BLD AUTO: 0.03 K/UL (ref 0–0.11)
IMM GRANULOCYTES NFR BLD AUTO: 0.5 % (ref 0–0.9)
IRON SATN MFR SERPL: 30 % (ref 15–55)
IRON SERPL-MCNC: 89 UG/DL (ref 40–170)
LYMPHOCYTES # BLD AUTO: 1.89 K/UL (ref 1–4.8)
LYMPHOCYTES NFR BLD: 29.4 % (ref 22–41)
MCH RBC QN AUTO: 31.8 PG (ref 27–33)
MCHC RBC AUTO-ENTMCNC: 33.7 G/DL (ref 32.2–35.5)
MCV RBC AUTO: 94.4 FL (ref 81.4–97.8)
MONOCYTES # BLD AUTO: 0.48 K/UL (ref 0–0.85)
MONOCYTES NFR BLD AUTO: 7.5 % (ref 0–13.4)
NEUTROPHILS # BLD AUTO: 3.36 K/UL (ref 1.82–7.42)
NEUTROPHILS NFR BLD: 52.2 % (ref 44–72)
NRBC # BLD AUTO: 0 K/UL
NRBC BLD-RTO: 0 /100 WBC (ref 0–0.2)
PLATELET # BLD AUTO: 252 K/UL (ref 164–446)
PMV BLD AUTO: 10.6 FL (ref 9–12.9)
POTASSIUM SERPL-SCNC: 4.4 MMOL/L (ref 3.6–5.5)
PROT SERPL-MCNC: 6.6 G/DL (ref 6–8.2)
RBC # BLD AUTO: 4.43 M/UL (ref 4.2–5.4)
SODIUM SERPL-SCNC: 140 MMOL/L (ref 135–145)
T4 FREE SERPL-MCNC: 1.19 NG/DL (ref 0.93–1.7)
TIBC SERPL-MCNC: 299 UG/DL (ref 250–450)
TSH SERPL DL<=0.005 MIU/L-ACNC: 1.9 UIU/ML (ref 0.38–5.33)
UIBC SERPL-MCNC: 210 UG/DL (ref 110–370)
VIT B12 SERPL-MCNC: 329 PG/ML (ref 211–911)
WBC # BLD AUTO: 6.4 K/UL (ref 4.8–10.8)

## 2024-06-19 PROCEDURE — 36415 COLL VENOUS BLD VENIPUNCTURE: CPT

## 2024-06-19 PROCEDURE — 85025 COMPLETE CBC W/AUTO DIFF WBC: CPT

## 2024-06-19 PROCEDURE — 82607 VITAMIN B-12: CPT

## 2024-06-19 PROCEDURE — 83540 ASSAY OF IRON: CPT

## 2024-06-19 PROCEDURE — 80053 COMPREHEN METABOLIC PANEL: CPT

## 2024-06-19 PROCEDURE — 84443 ASSAY THYROID STIM HORMONE: CPT

## 2024-06-19 PROCEDURE — 82728 ASSAY OF FERRITIN: CPT

## 2024-06-19 PROCEDURE — 83550 IRON BINDING TEST: CPT

## 2024-06-19 PROCEDURE — 86140 C-REACTIVE PROTEIN: CPT

## 2024-06-19 PROCEDURE — 85652 RBC SED RATE AUTOMATED: CPT

## 2024-06-19 PROCEDURE — 84439 ASSAY OF FREE THYROXINE: CPT

## 2024-07-10 ENCOUNTER — DOCUMENTATION (OUTPATIENT)
Dept: HEALTH INFORMATION MANAGEMENT | Facility: OTHER | Age: 54
End: 2024-07-10
Payer: COMMERCIAL

## 2024-07-19 ENCOUNTER — OFFICE VISIT (OUTPATIENT)
Dept: URGENT CARE | Facility: PHYSICIAN GROUP | Age: 54
End: 2024-07-19
Payer: COMMERCIAL

## 2024-07-19 VITALS
WEIGHT: 170.89 LBS | RESPIRATION RATE: 20 BRPM | BODY MASS INDEX: 31.45 KG/M2 | SYSTOLIC BLOOD PRESSURE: 120 MMHG | HEART RATE: 76 BPM | OXYGEN SATURATION: 96 % | DIASTOLIC BLOOD PRESSURE: 70 MMHG | TEMPERATURE: 98.2 F | HEIGHT: 62 IN

## 2024-07-19 DIAGNOSIS — U07.1 COVID-19: ICD-10-CM

## 2024-07-19 DIAGNOSIS — J45.40 MODERATE PERSISTENT ASTHMA WITHOUT COMPLICATION: ICD-10-CM

## 2024-07-19 PROCEDURE — 99203 OFFICE O/P NEW LOW 30 MIN: CPT | Performed by: PHYSICIAN ASSISTANT

## 2024-07-19 PROCEDURE — 3074F SYST BP LT 130 MM HG: CPT | Performed by: PHYSICIAN ASSISTANT

## 2024-07-19 PROCEDURE — 3078F DIAST BP <80 MM HG: CPT | Performed by: PHYSICIAN ASSISTANT

## 2024-07-19 RX ORDER — DEXTROMETHORPHAN HYDROBROMIDE AND PROMETHAZINE HYDROCHLORIDE 15; 6.25 MG/5ML; MG/5ML
5 SYRUP ORAL 3 TIMES DAILY PRN
Qty: 120 ML | Refills: 0 | Status: SHIPPED | OUTPATIENT
Start: 2024-07-19

## 2024-07-19 RX ORDER — MONTELUKAST SODIUM 10 MG/1
TABLET ORAL
COMMUNITY
Start: 2024-06-14

## 2024-07-19 RX ORDER — SOLIFENACIN SUCCINATE 10 MG/1
TABLET, FILM COATED ORAL
COMMUNITY
Start: 2024-06-14

## 2024-07-19 RX ORDER — CLONAZEPAM 1 MG/1
1 TABLET ORAL 3 TIMES DAILY
COMMUNITY
Start: 2024-07-13

## 2024-07-19 ASSESSMENT — ENCOUNTER SYMPTOMS
VOMITING: 0
CHILLS: 1
DIARRHEA: 1
CARDIOVASCULAR NEGATIVE: 1
DIZZINESS: 0
RHINORRHEA: 1
NAUSEA: 0
HEADACHES: 1
COUGH: 1
FEVER: 1
ABDOMINAL PAIN: 0
SHORTNESS OF BREATH: 0
MYALGIAS: 1
SORE THROAT: 0
WHEEZING: 0
SPUTUM PRODUCTION: 0

## 2024-07-19 ASSESSMENT — FIBROSIS 4 INDEX: FIB4 SCORE: 0.88

## 2024-10-02 ENCOUNTER — TELEPHONE (OUTPATIENT)
Dept: HEALTH INFORMATION MANAGEMENT | Facility: OTHER | Age: 54
End: 2024-10-02
Payer: COMMERCIAL

## 2024-10-18 ENCOUNTER — OFFICE VISIT (OUTPATIENT)
Dept: URGENT CARE | Facility: PHYSICIAN GROUP | Age: 54
End: 2024-10-18
Payer: COMMERCIAL

## 2024-10-18 VITALS
DIASTOLIC BLOOD PRESSURE: 74 MMHG | BODY MASS INDEX: 31.1 KG/M2 | RESPIRATION RATE: 16 BRPM | HEART RATE: 92 BPM | HEIGHT: 62 IN | WEIGHT: 169 LBS | OXYGEN SATURATION: 96 % | TEMPERATURE: 96.9 F | SYSTOLIC BLOOD PRESSURE: 136 MMHG

## 2024-10-18 DIAGNOSIS — H61.23 BILATERAL IMPACTED CERUMEN: ICD-10-CM

## 2024-10-18 DIAGNOSIS — H60.313 ACUTE DIFFUSE OTITIS EXTERNA OF BOTH EARS: ICD-10-CM

## 2024-10-18 PROCEDURE — 99213 OFFICE O/P EST LOW 20 MIN: CPT

## 2024-10-18 PROCEDURE — 3075F SYST BP GE 130 - 139MM HG: CPT

## 2024-10-18 PROCEDURE — 3078F DIAST BP <80 MM HG: CPT

## 2024-10-18 RX ORDER — AMOXICILLIN 500 MG/1
CAPSULE ORAL
COMMUNITY
Start: 2024-07-29

## 2024-10-18 RX ORDER — RISPERIDONE 3 MG/1
TABLET ORAL
COMMUNITY
Start: 2024-09-10

## 2024-10-18 RX ORDER — NEOMYCIN SULFATE, POLYMYXIN B SULFATE AND HYDROCORTISONE 10; 3.5; 1 MG/ML; MG/ML; [USP'U]/ML
4 SUSPENSION/ DROPS AURICULAR (OTIC) 4 TIMES DAILY
Qty: 16 ML | Refills: 0 | Status: SHIPPED | OUTPATIENT
Start: 2024-10-18 | End: 2024-10-28

## 2024-10-18 ASSESSMENT — FIBROSIS 4 INDEX: FIB4 SCORE: 0.88

## 2024-10-18 ASSESSMENT — ENCOUNTER SYMPTOMS
RHINORRHEA: 0
COUGH: 0
DIARRHEA: 0
SORE THROAT: 0
ABDOMINAL PAIN: 0
VOMITING: 0
NECK PAIN: 0
HEADACHES: 0
CHILLS: 0
FEVER: 0
NAUSEA: 0

## 2024-10-30 ENCOUNTER — RESEARCH ENCOUNTER (OUTPATIENT)
Dept: RESEARCH | Facility: MEDICAL CENTER | Age: 54
End: 2024-10-30

## 2024-10-30 DIAGNOSIS — Z00.6 CLINICAL TRIAL PARTICIPANT: ICD-10-CM

## 2024-10-30 NOTE — RESEARCH NOTE
Patient has signed the consent form and the applicable research collection order(s) have been created through the bulk flow.    Active Referral: No    Patient is ready for scheduling.

## 2024-12-07 ENCOUNTER — HOSPITAL ENCOUNTER (OUTPATIENT)
Dept: LAB | Facility: MEDICAL CENTER | Age: 54
End: 2024-12-07
Attending: STUDENT IN AN ORGANIZED HEALTH CARE EDUCATION/TRAINING PROGRAM
Payer: COMMERCIAL

## 2024-12-07 PROCEDURE — 82728 ASSAY OF FERRITIN: CPT

## 2024-12-07 PROCEDURE — 85652 RBC SED RATE AUTOMATED: CPT

## 2024-12-07 PROCEDURE — 86140 C-REACTIVE PROTEIN: CPT

## 2024-12-07 PROCEDURE — 80053 COMPREHEN METABOLIC PANEL: CPT

## 2024-12-07 PROCEDURE — 83550 IRON BINDING TEST: CPT

## 2024-12-07 PROCEDURE — 83540 ASSAY OF IRON: CPT

## 2024-12-07 PROCEDURE — 36415 COLL VENOUS BLD VENIPUNCTURE: CPT

## 2024-12-07 PROCEDURE — 84439 ASSAY OF FREE THYROXINE: CPT

## 2024-12-07 PROCEDURE — 85025 COMPLETE CBC W/AUTO DIFF WBC: CPT

## 2024-12-07 PROCEDURE — 84443 ASSAY THYROID STIM HORMONE: CPT

## 2024-12-07 PROCEDURE — 82607 VITAMIN B-12: CPT

## 2024-12-08 LAB
BASOPHILS # BLD AUTO: 1 % (ref 0–1.8)
BASOPHILS # BLD: 0.07 K/UL (ref 0–0.12)
EOSINOPHIL # BLD AUTO: 0.42 K/UL (ref 0–0.51)
EOSINOPHIL NFR BLD: 6 % (ref 0–6.9)
ERYTHROCYTE [DISTWIDTH] IN BLOOD BY AUTOMATED COUNT: 43.5 FL (ref 35.9–50)
ERYTHROCYTE [SEDIMENTATION RATE] IN BLOOD BY WESTERGREN METHOD: 9 MM/HOUR (ref 0–25)
HCT VFR BLD AUTO: 41.3 % (ref 37–47)
HGB BLD-MCNC: 14.2 G/DL (ref 12–16)
IMM GRANULOCYTES # BLD AUTO: 0.02 K/UL (ref 0–0.11)
IMM GRANULOCYTES NFR BLD AUTO: 0.3 % (ref 0–0.9)
LYMPHOCYTES # BLD AUTO: 1.63 K/UL (ref 1–4.8)
LYMPHOCYTES NFR BLD: 23.4 % (ref 22–41)
MCH RBC QN AUTO: 31.9 PG (ref 27–33)
MCHC RBC AUTO-ENTMCNC: 34.4 G/DL (ref 32.2–35.5)
MCV RBC AUTO: 92.8 FL (ref 81.4–97.8)
MONOCYTES # BLD AUTO: 0.52 K/UL (ref 0–0.85)
MONOCYTES NFR BLD AUTO: 7.4 % (ref 0–13.4)
NEUTROPHILS # BLD AUTO: 4.32 K/UL (ref 1.82–7.42)
NEUTROPHILS NFR BLD: 61.9 % (ref 44–72)
NRBC # BLD AUTO: 0 K/UL
NRBC BLD-RTO: 0 /100 WBC (ref 0–0.2)
PLATELET # BLD AUTO: 245 K/UL (ref 164–446)
PMV BLD AUTO: 10.3 FL (ref 9–12.9)
RBC # BLD AUTO: 4.45 M/UL (ref 4.2–5.4)
WBC # BLD AUTO: 7 K/UL (ref 4.8–10.8)

## 2024-12-09 LAB
ALBUMIN SERPL BCP-MCNC: 4.8 G/DL (ref 3.2–4.9)
ALBUMIN/GLOB SERPL: 2 G/DL
ALP SERPL-CCNC: 66 U/L (ref 30–99)
ALT SERPL-CCNC: 17 U/L (ref 2–50)
ANION GAP SERPL CALC-SCNC: 14 MMOL/L (ref 7–16)
AST SERPL-CCNC: 14 U/L (ref 12–45)
BILIRUB SERPL-MCNC: 0.5 MG/DL (ref 0.1–1.5)
BUN SERPL-MCNC: 12 MG/DL (ref 8–22)
CALCIUM ALBUM COR SERPL-MCNC: 8.9 MG/DL (ref 8.5–10.5)
CALCIUM SERPL-MCNC: 9.5 MG/DL (ref 8.5–10.5)
CHLORIDE SERPL-SCNC: 101 MMOL/L (ref 96–112)
CO2 SERPL-SCNC: 23 MMOL/L (ref 20–33)
CREAT SERPL-MCNC: 0.71 MG/DL (ref 0.5–1.4)
CRP SERPL HS-MCNC: <0.3 MG/DL (ref 0–0.75)
FERRITIN SERPL-MCNC: 403 NG/ML (ref 10–291)
GFR SERPLBLD CREATININE-BSD FMLA CKD-EPI: 100 ML/MIN/1.73 M 2
GLOBULIN SER CALC-MCNC: 2.4 G/DL (ref 1.9–3.5)
GLUCOSE SERPL-MCNC: 110 MG/DL (ref 65–99)
IRON SATN MFR SERPL: 28 % (ref 15–55)
IRON SERPL-MCNC: 92 UG/DL (ref 40–170)
POTASSIUM SERPL-SCNC: 4.4 MMOL/L (ref 3.6–5.5)
PROT SERPL-MCNC: 7.2 G/DL (ref 6–8.2)
SODIUM SERPL-SCNC: 138 MMOL/L (ref 135–145)
T4 FREE SERPL-MCNC: 1.25 NG/DL (ref 0.93–1.7)
TIBC SERPL-MCNC: 323 UG/DL (ref 250–450)
TSH SERPL-ACNC: 0.92 UIU/ML (ref 0.35–5.5)
UIBC SERPL-MCNC: 231 UG/DL (ref 110–370)
VIT B12 SERPL-MCNC: 336 PG/ML (ref 211–911)

## 2025-04-08 ENCOUNTER — OFFICE VISIT (OUTPATIENT)
Dept: URGENT CARE | Facility: PHYSICIAN GROUP | Age: 55
End: 2025-04-08
Payer: COMMERCIAL

## 2025-04-08 VITALS
DIASTOLIC BLOOD PRESSURE: 72 MMHG | TEMPERATURE: 96.8 F | RESPIRATION RATE: 16 BRPM | SYSTOLIC BLOOD PRESSURE: 118 MMHG | OXYGEN SATURATION: 98 % | HEART RATE: 64 BPM | HEIGHT: 62 IN | BODY MASS INDEX: 32.76 KG/M2 | WEIGHT: 178 LBS

## 2025-04-08 DIAGNOSIS — J01.40 ACUTE NON-RECURRENT PANSINUSITIS: ICD-10-CM

## 2025-04-08 PROCEDURE — 99213 OFFICE O/P EST LOW 20 MIN: CPT | Performed by: NURSE PRACTITIONER

## 2025-04-08 PROCEDURE — 3074F SYST BP LT 130 MM HG: CPT | Performed by: NURSE PRACTITIONER

## 2025-04-08 PROCEDURE — 3078F DIAST BP <80 MM HG: CPT | Performed by: NURSE PRACTITIONER

## 2025-04-08 RX ORDER — PREDNISONE 20 MG/1
20 TABLET ORAL DAILY
Qty: 5 TABLET | Refills: 0 | Status: SHIPPED | OUTPATIENT
Start: 2025-04-08 | End: 2025-04-13

## 2025-04-08 ASSESSMENT — ENCOUNTER SYMPTOMS
HEADACHES: 1
ANOREXIA: 1
VOMITING: 0
NAUSEA: 1
COUGH: 0
ABDOMINAL PAIN: 0
FATIGUE: 1
CHANGE IN BOWEL HABIT: 1
SINUS PAIN: 1
SORE THROAT: 1

## 2025-04-08 ASSESSMENT — FIBROSIS 4 INDEX: FIB4 SCORE: 0.76

## 2025-04-08 NOTE — PROGRESS NOTES
"Subjective:     Ambreen Higuera is a 54 y.o. female who presents for Sinus Pain, Runny Nose, Fatigue (5-6 days), and Pharyngitis      Sinus Pain  This is a new (Ambreen is a pleasant 54 year old female who presents to  today with complaitns of URI symptoms that started 6 days ago) problem. Associated symptoms include anorexia, a change in bowel habit (normal), congestion, fatigue, headaches, nausea and a sore throat. Pertinent negatives include no abdominal pain, coughing or vomiting. She has tried NSAIDs for the symptoms. The treatment provided mild relief.   Fatigue  Associated symptoms include anorexia, a change in bowel habit (normal), congestion, fatigue, headaches, nausea and a sore throat. Pertinent negatives include no abdominal pain, coughing or vomiting.   Pharyngitis   Associated symptoms include congestion and headaches. Pertinent negatives include no abdominal pain, coughing or vomiting.         Review of Systems   Constitutional:  Positive for fatigue and malaise/fatigue.   HENT:  Positive for congestion, sinus pain and sore throat.    Respiratory:  Negative for cough.    Gastrointestinal:  Positive for anorexia, change in bowel habit (normal) and nausea. Negative for abdominal pain and vomiting.   Neurological:  Positive for headaches.       PMH:   Past Medical History:   Diagnosis Date    Anesthesia     n/v    Arthritis     osteoarthritis     ASTHMA 8/28/2009    Bipolar disorder (HCC)     Cancer (HCC) 2002    cervical and uterine    Cataract     Depression with anxiety 2/10/2010    Heart burn     Hepatitis C 2011    High cholesterol     Hypertension     Indigestion     Other specified symptom associated with female genital organs     Pain     neck, back     Pre-diabetes     pt states \"prediabetic\" not currently on any medication     Psychiatric disorder     depression    Urinary bladder disorder     chronic cystitis     ALLERGIES:   Allergies   Allergen Reactions    Codeine Vomiting    " Doxycycline Nausea    Food Diarrhea     Diarrhea         Lactose Unspecified     Upset stomach, diarrhea      Latex      Rash     Monodox Vomiting    Morphine      Hallucinations     Tape      Adhesive tape-rash. Paper tape okay.     Vicodin [Hydrocodone-Acetaminophen] Vomiting     SURGHX:   Past Surgical History:   Procedure Laterality Date    LESION EXCISION GENERAL Left 9/14/2018    Procedure: LESION EXCISION GENERAL - 1 CM NARE PAPILLOMA W/SIMPLE CLOSURE;  Surgeon: Geri Byrne M.D.;  Location: SURGERY SAME DAY [facility];  Service: General    BREAST BIOPSY Left 9/14/2018    Procedure: BREAST BIOPSY- EXCISIONAL  AFTER WIRE LOC;  Surgeon: Dipika Ochoa M.D.;  Location: SURGERY SAME DAY [facility];  Service: General    BREAST BIOPSY Left 6/15/2018    Procedure: BREAST BIOPSY/ EXCISIONAL AFTER WIRE LOC;  Surgeon: Dipika Ochoa M.D.;  Location: SURGERY SAME DAY [facility];  Service: General    INJ,EPIDURAL,CERV/THOR SINGLE  10/22/2014    Performed by Miles Gaspar M.D. at [facility]    ABDOMINAL HYSTERECTOMY TOTAL      for uterine polyp, Hyperplastic     CHOLECYSTECTOMY      GYN SURGERY      hysterectomy    GYN SURGERY      cervix removal    OTHER ORTHOPEDIC SURGERY      c spine fusion    TUBAL COAGULATION LAPAROSCOPIC BILATERAL      US-NEEDLE CORE BX-BREAST PANEL       SOCHX:   Social History     Socioeconomic History    Marital status: Single   Tobacco Use    Smoking status: Former     Current packs/day: 0.25     Types: Cigarettes    Smokeless tobacco: Never   Vaping Use    Vaping status: Never Used   Substance and Sexual Activity    Alcohol use: No    Drug use: No    Sexual activity: Yes     Partners: Male     Comment:    Social History Narrative     at [employer]      FH:   Family History   Problem Relation Age of Onset    Cancer Father         lung    Heart Disease Father         arrythmia     Cancer Maternal Aunt     Hypertension Mother     Heart Disease  "Sister         svt    Heart Attack Maternal Grandfather     Cancer Paternal Grandmother         breast ca          Objective:   /72   Pulse 64   Temp 36 °C (96.8 °F) (Temporal)   Resp 16   Ht 1.575 m (5' 2\")   Wt 80.7 kg (178 lb)   LMP 03/08/2001   SpO2 98%   BMI 32.56 kg/m²     Physical Exam  Vitals and nursing note reviewed.   Constitutional:       General: She is not in acute distress.     Appearance: Normal appearance. She is normal weight. She is ill-appearing. She is not toxic-appearing.   HENT:      Head: Normocephalic.      Right Ear: Tympanic membrane, ear canal and external ear normal.      Left Ear: Tympanic membrane, ear canal and external ear normal.      Nose: Congestion present. No rhinorrhea.      Mouth/Throat:      Mouth: Mucous membranes are moist.      Pharynx: Uvula midline. Posterior oropharyngeal erythema present. No pharyngeal swelling, oropharyngeal exudate, uvula swelling or postnasal drip.      Tonsils: No tonsillar exudate or tonsillar abscesses. 1+ on the right. 1+ on the left.   Eyes:      General:         Right eye: No discharge.         Left eye: No discharge.      Pupils: Pupils are equal, round, and reactive to light.   Cardiovascular:      Rate and Rhythm: Normal rate and regular rhythm.      Pulses: Normal pulses.      Heart sounds: Normal heart sounds.   Pulmonary:      Effort: Pulmonary effort is normal. No respiratory distress.      Breath sounds: No stridor. No wheezing, rhonchi or rales.   Chest:      Chest wall: No tenderness.   Abdominal:      General: Abdomen is flat.   Musculoskeletal:         General: Normal range of motion.      Cervical back: Normal range of motion and neck supple. Tenderness present.   Lymphadenopathy:      Cervical: No cervical adenopathy.   Skin:     General: Skin is dry.   Neurological:      General: No focal deficit present.      Mental Status: She is alert and oriented to person, place, and time. Mental status is at baseline. "   Psychiatric:         Mood and Affect: Mood normal.         Behavior: Behavior normal.         Thought Content: Thought content normal.         Judgment: Judgment normal.         Assessment/Plan:   Assessment    1. Acute non-recurrent pansinusitis  predniSONE (DELTASONE) 20 MG Tab        Differential diagnoses discussed with patient.  Symptoms are likely related to allergies versus viral infection.  Patient was encouraged to use nasal rinse and nasal spray as previously directed by primary care provider.  She was started on a 5-day course of prednisone for relief of cough and sinusitis.  Patient to notify me if symptoms are not improved after course of prednisone and we will start antibiotic.  Continue with rest and increase fluids.

## 2025-08-09 ENCOUNTER — HOSPITAL ENCOUNTER (OUTPATIENT)
Dept: LAB | Facility: MEDICAL CENTER | Age: 55
End: 2025-08-09
Attending: STUDENT IN AN ORGANIZED HEALTH CARE EDUCATION/TRAINING PROGRAM
Payer: COMMERCIAL

## 2025-08-09 LAB
25(OH)D3 SERPL-MCNC: 34 NG/ML (ref 30–100)
ALBUMIN SERPL BCP-MCNC: 4.1 G/DL (ref 3.2–4.9)
ALBUMIN/GLOB SERPL: 1.9 G/DL
ALP SERPL-CCNC: 60 U/L (ref 30–99)
ALT SERPL-CCNC: 17 U/L (ref 2–50)
ANION GAP SERPL CALC-SCNC: 13 MMOL/L (ref 7–16)
AST SERPL-CCNC: 17 U/L (ref 12–45)
BASOPHILS # BLD AUTO: 1.7 % (ref 0–1.8)
BASOPHILS # BLD: 0.1 K/UL (ref 0–0.12)
BILIRUB SERPL-MCNC: 0.4 MG/DL (ref 0.1–1.5)
BUN SERPL-MCNC: 16 MG/DL (ref 8–22)
CALCIUM ALBUM COR SERPL-MCNC: 9.1 MG/DL (ref 8.5–10.5)
CALCIUM SERPL-MCNC: 9.2 MG/DL (ref 8.5–10.5)
CHLORIDE SERPL-SCNC: 102 MMOL/L (ref 96–112)
CHOLEST SERPL-MCNC: 191 MG/DL (ref 100–199)
CO2 SERPL-SCNC: 22 MMOL/L (ref 20–33)
CREAT SERPL-MCNC: 1.09 MG/DL (ref 0.5–1.4)
EOSINOPHIL # BLD AUTO: 0.64 K/UL (ref 0–0.51)
EOSINOPHIL NFR BLD: 10.8 % (ref 0–6.9)
ERYTHROCYTE [DISTWIDTH] IN BLOOD BY AUTOMATED COUNT: 46.1 FL (ref 35.9–50)
EST. AVERAGE GLUCOSE BLD GHB EST-MCNC: 123 MG/DL
FASTING STATUS PATIENT QL REPORTED: NORMAL
FERRITIN SERPL-MCNC: 309 NG/ML (ref 10–291)
GFR SERPLBLD CREATININE-BSD FMLA CKD-EPI: 60 ML/MIN/1.73 M 2
GLOBULIN SER CALC-MCNC: 2.2 G/DL (ref 1.9–3.5)
GLUCOSE SERPL-MCNC: 119 MG/DL (ref 65–99)
HBA1C MFR BLD: 5.9 % (ref 4–5.6)
HCT VFR BLD AUTO: 41.8 % (ref 37–47)
HDLC SERPL-MCNC: 37 MG/DL
HGB BLD-MCNC: 13.9 G/DL (ref 12–16)
IMM GRANULOCYTES # BLD AUTO: 0.02 K/UL (ref 0–0.11)
IMM GRANULOCYTES NFR BLD AUTO: 0.3 % (ref 0–0.9)
LDLC SERPL CALC-MCNC: 118 MG/DL
LYMPHOCYTES # BLD AUTO: 1.35 K/UL (ref 1–4.8)
LYMPHOCYTES NFR BLD: 22.9 % (ref 22–41)
MCH RBC QN AUTO: 32 PG (ref 27–33)
MCHC RBC AUTO-ENTMCNC: 33.3 G/DL (ref 32.2–35.5)
MCV RBC AUTO: 96.3 FL (ref 81.4–97.8)
MONOCYTES # BLD AUTO: 0.55 K/UL (ref 0–0.85)
MONOCYTES NFR BLD AUTO: 9.3 % (ref 0–13.4)
NEUTROPHILS # BLD AUTO: 3.24 K/UL (ref 1.82–7.42)
NEUTROPHILS NFR BLD: 55 % (ref 44–72)
NRBC # BLD AUTO: 0 K/UL
NRBC BLD-RTO: 0 /100 WBC (ref 0–0.2)
PLATELET # BLD AUTO: 258 K/UL (ref 164–446)
PMV BLD AUTO: 10.7 FL (ref 9–12.9)
POTASSIUM SERPL-SCNC: 4.2 MMOL/L (ref 3.6–5.5)
PROT SERPL-MCNC: 6.3 G/DL (ref 6–8.2)
RBC # BLD AUTO: 4.34 M/UL (ref 4.2–5.4)
SODIUM SERPL-SCNC: 137 MMOL/L (ref 135–145)
T4 FREE SERPL-MCNC: 1.14 NG/DL (ref 0.93–1.7)
TRIGL SERPL-MCNC: 180 MG/DL (ref 0–149)
TSH SERPL-ACNC: 0.87 UIU/ML (ref 0.38–5.33)
WBC # BLD AUTO: 5.9 K/UL (ref 4.8–10.8)

## 2025-08-09 PROCEDURE — 83036 HEMOGLOBIN GLYCOSYLATED A1C: CPT

## 2025-08-09 PROCEDURE — 85025 COMPLETE CBC W/AUTO DIFF WBC: CPT

## 2025-08-09 PROCEDURE — 80053 COMPREHEN METABOLIC PANEL: CPT

## 2025-08-09 PROCEDURE — 84443 ASSAY THYROID STIM HORMONE: CPT

## 2025-08-09 PROCEDURE — 80061 LIPID PANEL: CPT

## 2025-08-09 PROCEDURE — 82306 VITAMIN D 25 HYDROXY: CPT

## 2025-08-09 PROCEDURE — 84439 ASSAY OF FREE THYROXINE: CPT

## 2025-08-09 PROCEDURE — 82728 ASSAY OF FERRITIN: CPT

## 2025-08-09 PROCEDURE — 36415 COLL VENOUS BLD VENIPUNCTURE: CPT

## (undated) DEVICE — CATHETER IV 20 GA X 1-1/4 ---SURG.& SDS ONLY--- (50EA/BX)

## (undated) DEVICE — SET LEADWIRE 5 LEAD BEDSIDE DISPOSABLE ECG (1SET OF 5/EA)

## (undated) DEVICE — CHLORAPREP 26 ML APPLICATOR - ORANGE TINT(25/CA)

## (undated) DEVICE — ELECTRODE DUAL RETURN W/ CORD - (50/PK)

## (undated) DEVICE — CLOSURE WOUND 1/4 X 4 (STERI - STRIP) (50/BX 4BX/CA)

## (undated) DEVICE — SUTURE 2-0 SILK 12 X 18" (36PK/BX)"

## (undated) DEVICE — CORDS BIPOLAR COAGULATION - 12FT STERILE DISP. (10EA/BX)

## (undated) DEVICE — MASK AIRWAY SIZE 3 UNIQUE SILICON (10/BX)

## (undated) DEVICE — SPONGE GAUZESTER 4 X 4 4PLY - (128PK/CA)

## (undated) DEVICE — DRESSING ABDOMINAL PAD STERILE 8 X 10" (360EA/CA)"

## (undated) DEVICE — KIT ANESTHESIA W/CIRCUIT & 3/LT BAG W/FILTER (20EA/CA)

## (undated) DEVICE — SPONGE PEANUT - (5/PK 50PK/CA)

## (undated) DEVICE — SET EXTENSION WITH 2 PORTS (48EA/CA) ***PART #2C8610 IS A SUBSTITUTE*****

## (undated) DEVICE — SUTURE 4-0 VICRYL PLUS FS-2 - 27 INCH (36/BX)

## (undated) DEVICE — SUTURE GENERAL

## (undated) DEVICE — CANISTER SUCTION RIGID RED 1500CC (40EA/CA)

## (undated) DEVICE — 6-0 ETHILON CLEAR P-1 - (12/BX)

## (undated) DEVICE — BOVIE BLADE COATED &INSULATED - 25/PK

## (undated) DEVICE — GLOVE BIOGEL INDICATOR SZ 6.5 SURGICAL PF LTX - (50PR/BX 4BX/CA)

## (undated) DEVICE — SUTURE 4-0 MONOCRYL PLUS PS-2 - 27 INCH (36/BX)

## (undated) DEVICE — HEAD HOLDER JUNIOR/ADULT

## (undated) DEVICE — SPONGE GAUZESTER. 2X2 4-PL - (2/PK 50PK/BX 30BX/CS)

## (undated) DEVICE — TRAY SRGPRP PVP IOD WT PRP - (20/CA)

## (undated) DEVICE — LACTATED RINGERS INJ 1000 ML - (14EA/CA 60CA/PF)

## (undated) DEVICE — PROTECTOR ULNA NERVE - (36PR/CA)

## (undated) DEVICE — TUBE CONNECTING SUCTION - CLEAR PLASTIC STERILE 72 IN (50EA/CA)

## (undated) DEVICE — SENSOR SPO2 NEO LNCS ADHESIVE (20/BX) SEE USER NOTES

## (undated) DEVICE — CANISTER SUCTION 3000ML MECHANICAL FILTER AUTO SHUTOFF MEDI-VAC NONSTERILE LF DISP  (40EA/CA)

## (undated) DEVICE — SUTURE 3-0 VICRYL PLUS SH - 8X 18 INCH (12/BX)

## (undated) DEVICE — STAPLER SKIN DISP - (6/BX 10BX/CA) VISISTAT

## (undated) DEVICE — MASK ANESTHESIA ADULT  - (100/CA)

## (undated) DEVICE — WATER IRRIGATION STERILE 1000ML (12EA/CA)

## (undated) DEVICE — DRESSING TRANSPARENT FILM TEGADERM 2.375 X 2.75"  (100EA/BX)"

## (undated) DEVICE — DRAPE LAPAROTOMY T SHEET - (12EA/CA)

## (undated) DEVICE — CLOSURE SKIN STRIP 1/2 X 4 IN - (STERI STRIP) (50/BX 4BX/CA)

## (undated) DEVICE — ELECTRODE 850 FOAM ADHESIVE - HYDROGEL RADIOTRNSPRNT (50/PK)

## (undated) DEVICE — GOWN WARMING STANDARD FLEX - (30/CA)

## (undated) DEVICE — TUBING CLEARLINK DUO-VENT - C-FLO (48EA/CA)

## (undated) DEVICE — SUTURE 5-0 VICRYL PLUS FS-2 27 (36PK/BX)"

## (undated) DEVICE — SUTURE 5-0 ETHILON P-3 18 (12PK/BX)"

## (undated) DEVICE — PACK MINOR BASIN - (2EA/CA)

## (undated) DEVICE — KIT  I.V. START (100EA/CA)

## (undated) DEVICE — NEPTUNE 4 PORT MANIFOLD - (20/PK)

## (undated) DEVICE — BANDAID X-LARGE 2 X 4 IN LF (50EA/BX)

## (undated) DEVICE — DRAPE STRLE REG TOWEL 18X24 - (10/BX 4BX/CA)"

## (undated) DEVICE — SODIUM CHL IRRIGATION 0.9% 1000ML (12EA/CA)

## (undated) DEVICE — SLEEVE, VASO, THIGH, MED

## (undated) DEVICE — SUCTION INSTRUMENT YANKAUER BULBOUS TIP W/O VENT (50EA/CA)

## (undated) DEVICE — SUTURE 2-0 COATED VICRYL PLUS - 12 X 18 INCH (12/BX)

## (undated) DEVICE — BANDAGE STER SOF-FORM 4X75IN - (12EA/BX 8BX/CA)